# Patient Record
Sex: FEMALE | Race: WHITE | NOT HISPANIC OR LATINO | Employment: STUDENT | URBAN - METROPOLITAN AREA
[De-identification: names, ages, dates, MRNs, and addresses within clinical notes are randomized per-mention and may not be internally consistent; named-entity substitution may affect disease eponyms.]

---
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
## 2021-01-07 ENCOUNTER — PATIENT MESSAGE (OUTPATIENT)
Dept: PEDIATRICS | Facility: CLINIC | Age: 21
End: 2021-01-07

## 2021-01-07 ENCOUNTER — LAB VISIT (OUTPATIENT)
Dept: LAB | Facility: HOSPITAL | Age: 21
End: 2021-01-07
Attending: PEDIATRICS
Payer: COMMERCIAL

## 2021-01-07 ENCOUNTER — OFFICE VISIT (OUTPATIENT)
Dept: PEDIATRICS | Facility: CLINIC | Age: 21
End: 2021-01-07
Payer: COMMERCIAL

## 2021-01-07 VITALS
BODY MASS INDEX: 26.17 KG/M2 | DIASTOLIC BLOOD PRESSURE: 74 MMHG | HEIGHT: 67 IN | WEIGHT: 166.75 LBS | SYSTOLIC BLOOD PRESSURE: 112 MMHG | OXYGEN SATURATION: 84 % | HEART RATE: 70 BPM

## 2021-01-07 DIAGNOSIS — F60.3 BORDERLINE PERSONALITY DISORDER IN ADOLESCENT: ICD-10-CM

## 2021-01-07 DIAGNOSIS — K90.49 PLE (PROTEIN LOSING ENTEROPATHY): ICD-10-CM

## 2021-01-07 DIAGNOSIS — Z98.890 S/P FONTAN PROCEDURE: ICD-10-CM

## 2021-01-07 DIAGNOSIS — Z28.21 INFLUENZA VACCINE REFUSED: ICD-10-CM

## 2021-01-07 DIAGNOSIS — Q23.4 HLHS (HYPOPLASTIC LEFT HEART SYNDROME): ICD-10-CM

## 2021-01-07 DIAGNOSIS — Z00.00 ENCOUNTER FOR WELL ADULT EXAM WITHOUT ABNORMAL FINDINGS: Primary | ICD-10-CM

## 2021-01-07 LAB
ALBUMIN SERPL BCP-MCNC: 4.3 G/DL (ref 3.5–5.2)
ALP SERPL-CCNC: 69 U/L (ref 55–135)
ALT SERPL W/O P-5'-P-CCNC: 31 U/L (ref 10–44)
ANION GAP SERPL CALC-SCNC: 9 MMOL/L (ref 8–16)
AST SERPL-CCNC: 20 U/L (ref 10–40)
BASOPHILS # BLD AUTO: 0.04 K/UL (ref 0–0.2)
BASOPHILS NFR BLD: 0.5 % (ref 0–1.9)
BILIRUB SERPL-MCNC: 0.9 MG/DL (ref 0.1–1)
BNP SERPL-MCNC: 19 PG/ML (ref 0–99)
BUN SERPL-MCNC: 11 MG/DL (ref 6–20)
CALCIUM SERPL-MCNC: 9.3 MG/DL (ref 8.7–10.5)
CHLORIDE SERPL-SCNC: 104 MMOL/L (ref 95–110)
CO2 SERPL-SCNC: 26 MMOL/L (ref 23–29)
CREAT SERPL-MCNC: 0.8 MG/DL (ref 0.5–1.4)
DIFFERENTIAL METHOD: ABNORMAL
EOSINOPHIL # BLD AUTO: 0.2 K/UL (ref 0–0.5)
EOSINOPHIL NFR BLD: 2.5 % (ref 0–8)
ERYTHROCYTE [DISTWIDTH] IN BLOOD BY AUTOMATED COUNT: 12.8 % (ref 11.5–14.5)
EST. GFR  (AFRICAN AMERICAN): >60 ML/MIN/1.73 M^2
EST. GFR  (NON AFRICAN AMERICAN): >60 ML/MIN/1.73 M^2
GLUCOSE SERPL-MCNC: 87 MG/DL (ref 70–110)
HCT VFR BLD AUTO: 45.6 % (ref 37–48.5)
HGB BLD-MCNC: 15.6 G/DL (ref 12–16)
LYMPHOCYTES # BLD AUTO: 0.9 K/UL (ref 1–4.8)
LYMPHOCYTES NFR BLD: 11 % (ref 18–48)
MCH RBC QN AUTO: 31.6 PG (ref 27–31)
MCHC RBC AUTO-ENTMCNC: 34.2 G/DL (ref 32–36)
MCV RBC AUTO: 92 FL (ref 82–98)
MONOCYTES # BLD AUTO: 0.7 K/UL (ref 0.3–1)
MONOCYTES NFR BLD: 8.4 % (ref 4–15)
NEUTROPHILS # BLD AUTO: 6 K/UL (ref 1.8–7.7)
NEUTROPHILS NFR BLD: 77.6 % (ref 38–73)
PLATELET # BLD AUTO: 207 K/UL (ref 150–350)
PMV BLD AUTO: 10.8 FL (ref 9.2–12.9)
POTASSIUM SERPL-SCNC: 4.1 MMOL/L (ref 3.5–5.1)
PROT SERPL-MCNC: 6.9 G/DL (ref 6–8.4)
RBC # BLD AUTO: 4.94 M/UL (ref 4–5.4)
SODIUM SERPL-SCNC: 139 MMOL/L (ref 136–145)
WBC # BLD AUTO: 7.74 K/UL (ref 3.9–12.7)

## 2021-01-07 PROCEDURE — 99999 PR PBB SHADOW E&M-EST. PATIENT-LVL III: CPT | Mod: PBBFAC,,, | Performed by: PEDIATRICS

## 2021-01-07 PROCEDURE — 80053 COMPREHEN METABOLIC PANEL: CPT

## 2021-01-07 PROCEDURE — 85025 COMPLETE CBC W/AUTO DIFF WBC: CPT

## 2021-01-07 PROCEDURE — 99395 PREV VISIT EST AGE 18-39: CPT | Mod: S$GLB,,, | Performed by: PEDIATRICS

## 2021-01-07 PROCEDURE — 83880 ASSAY OF NATRIURETIC PEPTIDE: CPT

## 2021-01-07 PROCEDURE — 99395 PR PREVENTIVE VISIT,EST,18-39: ICD-10-PCS | Mod: S$GLB,,, | Performed by: PEDIATRICS

## 2021-01-07 PROCEDURE — 36415 COLL VENOUS BLD VENIPUNCTURE: CPT

## 2021-01-07 PROCEDURE — 99999 PR PBB SHADOW E&M-EST. PATIENT-LVL III: ICD-10-PCS | Mod: PBBFAC,,, | Performed by: PEDIATRICS

## 2021-01-07 RX ORDER — SERTRALINE HYDROCHLORIDE 50 MG/1
100 TABLET, FILM COATED ORAL DAILY
COMMUNITY
Start: 2020-12-28

## 2021-01-10 PROBLEM — F60.3 BORDERLINE PERSONALITY DISORDER IN ADOLESCENT: Status: ACTIVE | Noted: 2021-01-10

## 2021-01-11 ENCOUNTER — CLINICAL SUPPORT (OUTPATIENT)
Dept: PEDIATRIC CARDIOLOGY | Facility: CLINIC | Age: 21
End: 2021-01-11
Payer: COMMERCIAL

## 2021-01-11 ENCOUNTER — OFFICE VISIT (OUTPATIENT)
Dept: PEDIATRIC CARDIOLOGY | Facility: CLINIC | Age: 21
End: 2021-01-11
Payer: COMMERCIAL

## 2021-01-11 ENCOUNTER — HOSPITAL ENCOUNTER (OUTPATIENT)
Dept: PEDIATRIC CARDIOLOGY | Facility: HOSPITAL | Age: 21
Discharge: HOME | End: 2021-01-11
Attending: PEDIATRICS
Payer: COMMERCIAL

## 2021-01-11 VITALS
WEIGHT: 166.75 LBS | HEIGHT: 68 IN | DIASTOLIC BLOOD PRESSURE: 71 MMHG | SYSTOLIC BLOOD PRESSURE: 155 MMHG | HEART RATE: 77 BPM | BODY MASS INDEX: 25.27 KG/M2 | OXYGEN SATURATION: 77 %

## 2021-01-11 DIAGNOSIS — R00.2 PALPITATIONS: ICD-10-CM

## 2021-01-11 DIAGNOSIS — K90.49 PLE (PROTEIN LOSING ENTEROPATHY): ICD-10-CM

## 2021-01-11 DIAGNOSIS — Z98.890 S/P FONTAN PROCEDURE: ICD-10-CM

## 2021-01-11 DIAGNOSIS — Q23.4 HLHS (HYPOPLASTIC LEFT HEART SYNDROME): Primary | ICD-10-CM

## 2021-01-11 DIAGNOSIS — Z98.890 PERSONAL HISTORY OF SURGERY TO HEART AND GREAT VESSELS, PRESENTING HAZARDS TO HEALTH: ICD-10-CM

## 2021-01-11 DIAGNOSIS — Q23.4 HLHS (HYPOPLASTIC LEFT HEART SYNDROME): ICD-10-CM

## 2021-01-11 DIAGNOSIS — Z98.890 POST-FONTAN PROTEIN-LOSING ENTEROPATHY: ICD-10-CM

## 2021-01-11 DIAGNOSIS — Q27.30 AVM (ARTERIOVENOUS MALFORMATION): ICD-10-CM

## 2021-01-11 DIAGNOSIS — Q25.79: ICD-10-CM

## 2021-01-11 DIAGNOSIS — R68.89 EXERCISE INTOLERANCE: ICD-10-CM

## 2021-01-11 DIAGNOSIS — Q25.1 AORTA COARCTATION: ICD-10-CM

## 2021-01-11 DIAGNOSIS — K90.49 POST-FONTAN PROTEIN-LOSING ENTEROPATHY: ICD-10-CM

## 2021-01-11 DIAGNOSIS — Q25.6 PULMONARY ARTERY STENOSIS OF CENTRAL BRANCH: ICD-10-CM

## 2021-01-11 PROCEDURE — 99215 PR OFFICE/OUTPT VISIT, EST, LEVL V, 40-54 MIN: ICD-10-PCS | Mod: 25,S$GLB,, | Performed by: PEDIATRICS

## 2021-01-11 PROCEDURE — 99999 PR PBB SHADOW E&M-EST. PATIENT-LVL III: CPT | Mod: PBBFAC,,, | Performed by: PEDIATRICS

## 2021-01-11 PROCEDURE — 99215 OFFICE O/P EST HI 40 MIN: CPT | Mod: 25,S$GLB,, | Performed by: PEDIATRICS

## 2021-01-11 PROCEDURE — 93000 EKG 12-LEAD PEDIATRIC: ICD-10-PCS | Mod: S$GLB,,, | Performed by: PEDIATRICS

## 2021-01-11 PROCEDURE — 93000 ELECTROCARDIOGRAM COMPLETE: CPT | Mod: S$GLB,,, | Performed by: PEDIATRICS

## 2021-01-11 PROCEDURE — 93303 PR ECHO XTHORACIC,CONG A2M,COMPLETE: ICD-10-PCS | Mod: 26,,, | Performed by: PEDIATRICS

## 2021-01-11 PROCEDURE — 99999 PR PBB SHADOW E&M-EST. PATIENT-LVL III: ICD-10-PCS | Mod: PBBFAC,,, | Performed by: PEDIATRICS

## 2021-01-11 PROCEDURE — 93320 PR DOPPLER ECHO HEART,COMPLETE: ICD-10-PCS | Mod: 26,,, | Performed by: PEDIATRICS

## 2021-01-11 PROCEDURE — 93325 PR DOPPLER COLOR FLOW VELOCITY MAP: ICD-10-PCS | Mod: 26,,, | Performed by: PEDIATRICS

## 2021-01-11 PROCEDURE — 93320 DOPPLER ECHO COMPLETE: CPT | Mod: 26,,, | Performed by: PEDIATRICS

## 2021-01-11 PROCEDURE — 93303 ECHO TRANSTHORACIC: CPT | Mod: 26,,, | Performed by: PEDIATRICS

## 2021-01-11 PROCEDURE — 93325 DOPPLER ECHO COLOR FLOW MAPG: CPT | Mod: 26,,, | Performed by: PEDIATRICS

## 2021-01-12 ENCOUNTER — TELEPHONE (OUTPATIENT)
Dept: PEDIATRIC GASTROENTEROLOGY | Facility: CLINIC | Age: 21
End: 2021-01-12

## 2021-01-12 DIAGNOSIS — Z98.890 S/P FONTAN PROCEDURE: Primary | ICD-10-CM

## 2021-01-25 ENCOUNTER — DOCUMENTATION ONLY (OUTPATIENT)
Dept: TRANSPLANT | Facility: CLINIC | Age: 21
End: 2021-01-25

## 2021-01-26 ENCOUNTER — TELEPHONE (OUTPATIENT)
Dept: PEDIATRIC GASTROENTEROLOGY | Facility: CLINIC | Age: 21
End: 2021-01-26

## 2021-02-02 ENCOUNTER — HOSPITAL ENCOUNTER (EMERGENCY)
Facility: HOSPITAL | Age: 21
Discharge: HOME | End: 2021-02-03
Attending: EMERGENCY MEDICINE
Payer: COMMERCIAL

## 2021-02-02 ENCOUNTER — TELEPHONE (OUTPATIENT)
Dept: PEDIATRIC CARDIOLOGY | Facility: HOSPITAL | Age: 21
End: 2021-02-02

## 2021-02-02 DIAGNOSIS — Q23.4 HYPOPLASTIC LEFT HEART: ICD-10-CM

## 2021-02-02 DIAGNOSIS — R19.7 DIARRHEA, UNSPECIFIED TYPE: ICD-10-CM

## 2021-02-02 DIAGNOSIS — R06.02 SHORTNESS OF BREATH: Primary | ICD-10-CM

## 2021-02-02 DIAGNOSIS — R06.02 SOB (SHORTNESS OF BREATH): ICD-10-CM

## 2021-02-02 LAB
ALBUMIN SERPL BCP-MCNC: 4.2 G/DL (ref 3.5–5.2)
ALP SERPL-CCNC: 73 U/L (ref 55–135)
ALT SERPL W/O P-5'-P-CCNC: 33 U/L (ref 10–44)
ANION GAP SERPL CALC-SCNC: 10 MMOL/L (ref 8–16)
AST SERPL-CCNC: 33 U/L (ref 10–40)
B-HCG UR QL: NEGATIVE
BACTERIA #/AREA URNS AUTO: NORMAL /HPF
BASOPHILS # BLD AUTO: 0.03 K/UL (ref 0–0.2)
BASOPHILS NFR BLD: 0.3 % (ref 0–1.9)
BILIRUB SERPL-MCNC: 0.9 MG/DL (ref 0.1–1)
BILIRUB UR QL STRIP: NEGATIVE
BNP SERPL-MCNC: <10 PG/ML (ref 0–99)
BUN SERPL-MCNC: 12 MG/DL (ref 6–20)
CALCIUM SERPL-MCNC: 9.7 MG/DL (ref 8.7–10.5)
CHLORIDE SERPL-SCNC: 108 MMOL/L (ref 95–110)
CLARITY UR REFRACT.AUTO: ABNORMAL
CO2 SERPL-SCNC: 23 MMOL/L (ref 23–29)
COLOR UR AUTO: YELLOW
CREAT SERPL-MCNC: 0.8 MG/DL (ref 0.5–1.4)
CTP QC/QA: YES
CTP QC/QA: YES
D DIMER PPP IA.FEU-MCNC: 0.25 MG/L FEU
DIFFERENTIAL METHOD: ABNORMAL
EOSINOPHIL # BLD AUTO: 0.2 K/UL (ref 0–0.5)
EOSINOPHIL NFR BLD: 2 % (ref 0–8)
ERYTHROCYTE [DISTWIDTH] IN BLOOD BY AUTOMATED COUNT: 12.1 % (ref 11.5–14.5)
EST. GFR  (AFRICAN AMERICAN): >60 ML/MIN/1.73 M^2
EST. GFR  (NON AFRICAN AMERICAN): >60 ML/MIN/1.73 M^2
GLUCOSE SERPL-MCNC: 74 MG/DL (ref 70–110)
GLUCOSE UR QL STRIP: NEGATIVE
HCT VFR BLD AUTO: 44.2 % (ref 37–48.5)
HGB BLD-MCNC: 15.3 G/DL (ref 12–16)
HGB UR QL STRIP: ABNORMAL
IMM GRANULOCYTES # BLD AUTO: 0.02 K/UL (ref 0–0.04)
IMM GRANULOCYTES NFR BLD AUTO: 0.2 % (ref 0–0.5)
KETONES UR QL STRIP: NEGATIVE
LEUKOCYTE ESTERASE UR QL STRIP: NEGATIVE
LYMPHOCYTES # BLD AUTO: 0.9 K/UL (ref 1–4.8)
LYMPHOCYTES NFR BLD: 9.5 % (ref 18–48)
MCH RBC QN AUTO: 31.7 PG (ref 27–31)
MCHC RBC AUTO-ENTMCNC: 34.6 G/DL (ref 32–36)
MCV RBC AUTO: 92 FL (ref 82–98)
MICROSCOPIC COMMENT: NORMAL
MONOCYTES # BLD AUTO: 0.4 K/UL (ref 0.3–1)
MONOCYTES NFR BLD: 4.7 % (ref 4–15)
NEUTROPHILS # BLD AUTO: 7.8 K/UL (ref 1.8–7.7)
NEUTROPHILS NFR BLD: 83.3 % (ref 38–73)
NITRITE UR QL STRIP: NEGATIVE
NRBC BLD-RTO: 0 /100 WBC
PH UR STRIP: 5 [PH] (ref 5–8)
PLATELET # BLD AUTO: 206 K/UL (ref 150–350)
PMV BLD AUTO: 11.6 FL (ref 9.2–12.9)
POTASSIUM SERPL-SCNC: 4.4 MMOL/L (ref 3.5–5.1)
PROT SERPL-MCNC: 7.4 G/DL (ref 6–8.4)
PROT UR QL STRIP: NEGATIVE
RBC # BLD AUTO: 4.82 M/UL (ref 4–5.4)
RBC #/AREA URNS AUTO: 1 /HPF (ref 0–4)
SARS-COV-2 RDRP RESP QL NAA+PROBE: NEGATIVE
SODIUM SERPL-SCNC: 141 MMOL/L (ref 136–145)
SP GR UR STRIP: >=1.03 (ref 1–1.03)
SQUAMOUS #/AREA URNS AUTO: 2 /HPF
TROPONIN I SERPL DL<=0.01 NG/ML-MCNC: <0.006 NG/ML (ref 0–0.03)
URN SPEC COLLECT METH UR: ABNORMAL
WBC # BLD AUTO: 9.4 K/UL (ref 3.9–12.7)
WBC #/AREA URNS AUTO: 1 /HPF (ref 0–5)

## 2021-02-02 PROCEDURE — U0002 COVID-19 LAB TEST NON-CDC: HCPCS | Performed by: EMERGENCY MEDICINE

## 2021-02-02 PROCEDURE — 81001 URINALYSIS AUTO W/SCOPE: CPT

## 2021-02-02 PROCEDURE — 83880 ASSAY OF NATRIURETIC PEPTIDE: CPT

## 2021-02-02 PROCEDURE — 99285 PR EMERGENCY DEPT VISIT,LEVEL V: ICD-10-PCS | Mod: CR,,, | Performed by: EMERGENCY MEDICINE

## 2021-02-02 PROCEDURE — 85025 COMPLETE CBC W/AUTO DIFF WBC: CPT

## 2021-02-02 PROCEDURE — 93010 EKG 12-LEAD: ICD-10-PCS | Mod: ,,, | Performed by: PEDIATRICS

## 2021-02-02 PROCEDURE — 93005 ELECTROCARDIOGRAM TRACING: CPT

## 2021-02-02 PROCEDURE — 99285 EMERGENCY DEPT VISIT HI MDM: CPT | Mod: 25

## 2021-02-02 PROCEDURE — 84484 ASSAY OF TROPONIN QUANT: CPT

## 2021-02-02 PROCEDURE — 81025 URINE PREGNANCY TEST: CPT | Performed by: EMERGENCY MEDICINE

## 2021-02-02 PROCEDURE — 80053 COMPREHEN METABOLIC PANEL: CPT

## 2021-02-02 PROCEDURE — 85379 FIBRIN DEGRADATION QUANT: CPT

## 2021-02-02 PROCEDURE — 93010 ELECTROCARDIOGRAM REPORT: CPT | Mod: ,,, | Performed by: PEDIATRICS

## 2021-02-02 PROCEDURE — 99285 EMERGENCY DEPT VISIT HI MDM: CPT | Mod: CR,,, | Performed by: EMERGENCY MEDICINE

## 2021-02-03 VITALS
SYSTOLIC BLOOD PRESSURE: 135 MMHG | HEIGHT: 68 IN | BODY MASS INDEX: 25.16 KG/M2 | HEART RATE: 86 BPM | TEMPERATURE: 99 F | OXYGEN SATURATION: 87 % | RESPIRATION RATE: 20 BRPM | WEIGHT: 166 LBS | DIASTOLIC BLOOD PRESSURE: 67 MMHG

## 2021-02-09 ENCOUNTER — INFUSION (OUTPATIENT)
Dept: INFECTIOUS DISEASES | Facility: HOSPITAL | Age: 21
End: 2021-02-09
Attending: PEDIATRICS
Payer: COMMERCIAL

## 2021-02-09 ENCOUNTER — TELEPHONE (OUTPATIENT)
Dept: PEDIATRICS | Facility: CLINIC | Age: 21
End: 2021-02-09

## 2021-02-09 VITALS
DIASTOLIC BLOOD PRESSURE: 58 MMHG | BODY MASS INDEX: 25.11 KG/M2 | HEIGHT: 67 IN | SYSTOLIC BLOOD PRESSURE: 107 MMHG | OXYGEN SATURATION: 86 % | HEART RATE: 88 BPM | TEMPERATURE: 99 F | RESPIRATION RATE: 18 BRPM | WEIGHT: 160 LBS

## 2021-02-09 DIAGNOSIS — Q23.4 HLHS (HYPOPLASTIC LEFT HEART SYNDROME): ICD-10-CM

## 2021-02-09 DIAGNOSIS — R09.02 HYPOXIA: ICD-10-CM

## 2021-02-09 DIAGNOSIS — U07.1 COVID-19 VIRUS INFECTION: Primary | ICD-10-CM

## 2021-02-09 DIAGNOSIS — Z98.890 PERSONAL HISTORY OF SURGERY TO HEART AND GREAT VESSELS, PRESENTING HAZARDS TO HEALTH: ICD-10-CM

## 2021-02-09 PROCEDURE — M0239 BAMLANIVIMAB-XXXX INFUSION: HCPCS | Performed by: INTERNAL MEDICINE

## 2021-02-09 PROCEDURE — 25000003 PHARM REV CODE 250: Performed by: INTERNAL MEDICINE

## 2021-02-09 PROCEDURE — 63600175 PHARM REV CODE 636 W HCPCS: Performed by: INTERNAL MEDICINE

## 2021-02-09 RX ORDER — EPINEPHRINE 0.1 MG/ML
0.3 INJECTION INTRAVENOUS
Status: DISCONTINUED | OUTPATIENT
Start: 2021-02-09 | End: 2021-08-02

## 2021-02-09 RX ORDER — ALBUTEROL SULFATE 90 UG/1
2 AEROSOL, METERED RESPIRATORY (INHALATION)
Status: DISCONTINUED | OUTPATIENT
Start: 2021-02-09 | End: 2021-05-27 | Stop reason: HOSPADM

## 2021-02-09 RX ORDER — ACETAMINOPHEN 325 MG/1
650 TABLET ORAL ONCE AS NEEDED
Status: DISCONTINUED | OUTPATIENT
Start: 2021-02-09 | End: 2021-07-27

## 2021-02-09 RX ORDER — DIPHENHYDRAMINE HYDROCHLORIDE 50 MG/ML
25 INJECTION INTRAMUSCULAR; INTRAVENOUS ONCE AS NEEDED
Status: DISCONTINUED | OUTPATIENT
Start: 2021-02-09 | End: 2021-08-02

## 2021-02-09 RX ORDER — SODIUM CHLORIDE 0.9 % (FLUSH) 0.9 %
10 SYRINGE (ML) INJECTION
Status: DISCONTINUED | OUTPATIENT
Start: 2021-02-09 | End: 2021-08-02

## 2021-02-09 RX ORDER — ONDANSETRON 4 MG/1
4 TABLET, ORALLY DISINTEGRATING ORAL ONCE AS NEEDED
Status: DISCONTINUED | OUTPATIENT
Start: 2021-02-09 | End: 2021-08-02

## 2021-02-09 RX ADMIN — SODIUM CHLORIDE 700 MG: 9 INJECTION, SOLUTION INTRAVENOUS at 01:02

## 2021-02-15 ENCOUNTER — HOSPITAL ENCOUNTER (EMERGENCY)
Facility: HOSPITAL | Age: 21
Discharge: HOME | End: 2021-02-15
Attending: EMERGENCY MEDICINE
Payer: COMMERCIAL

## 2021-02-15 ENCOUNTER — NURSE TRIAGE (OUTPATIENT)
Dept: ADMINISTRATIVE | Facility: CLINIC | Age: 21
End: 2021-02-15

## 2021-02-15 VITALS
SYSTOLIC BLOOD PRESSURE: 113 MMHG | HEART RATE: 92 BPM | TEMPERATURE: 98 F | OXYGEN SATURATION: 88 % | RESPIRATION RATE: 20 BRPM | HEIGHT: 67 IN | WEIGHT: 165 LBS | DIASTOLIC BLOOD PRESSURE: 69 MMHG | BODY MASS INDEX: 25.9 KG/M2

## 2021-02-15 DIAGNOSIS — Q25.6 PULMONARY ARTERY STENOSIS OF CENTRAL BRANCH: ICD-10-CM

## 2021-02-15 DIAGNOSIS — R09.02 HYPOXEMIA: ICD-10-CM

## 2021-02-15 DIAGNOSIS — Z98.890 PERSONAL HISTORY OF SURGERY TO HEART AND GREAT VESSELS, PRESENTING HAZARDS TO HEALTH: ICD-10-CM

## 2021-02-15 DIAGNOSIS — Q23.4 HLHS (HYPOPLASTIC LEFT HEART SYNDROME): ICD-10-CM

## 2021-02-15 DIAGNOSIS — Z71.1 PERSON WITH FEARED COMPLAINT IN WHOM NO DIAGNOSIS IS MADE: Primary | ICD-10-CM

## 2021-02-15 LAB
ALBUMIN SERPL BCP-MCNC: 4.2 G/DL (ref 3.5–5.2)
ALP SERPL-CCNC: 77 U/L (ref 55–135)
ALT SERPL W/O P-5'-P-CCNC: 32 U/L (ref 10–44)
ANION GAP SERPL CALC-SCNC: 9 MMOL/L (ref 8–16)
AST SERPL-CCNC: 27 U/L (ref 10–40)
B-HCG UR QL: NEGATIVE
BASOPHILS # BLD AUTO: 0.03 K/UL (ref 0–0.2)
BASOPHILS NFR BLD: 0.4 % (ref 0–1.9)
BILIRUB SERPL-MCNC: 0.7 MG/DL (ref 0.1–1)
BILIRUB UR QL STRIP: NEGATIVE
BNP SERPL-MCNC: <10 PG/ML (ref 0–99)
BUN SERPL-MCNC: 16 MG/DL (ref 6–20)
CALCIUM SERPL-MCNC: 9.4 MG/DL (ref 8.7–10.5)
CHLORIDE SERPL-SCNC: 106 MMOL/L (ref 95–110)
CLARITY UR REFRACT.AUTO: ABNORMAL
CO2 SERPL-SCNC: 23 MMOL/L (ref 23–29)
COLOR UR AUTO: YELLOW
CREAT SERPL-MCNC: 0.8 MG/DL (ref 0.5–1.4)
CTP QC/QA: YES
DIFFERENTIAL METHOD: ABNORMAL
EOSINOPHIL # BLD AUTO: 0.2 K/UL (ref 0–0.5)
EOSINOPHIL NFR BLD: 2.8 % (ref 0–8)
ERYTHROCYTE [DISTWIDTH] IN BLOOD BY AUTOMATED COUNT: 12.1 % (ref 11.5–14.5)
EST. GFR  (AFRICAN AMERICAN): >60 ML/MIN/1.73 M^2
EST. GFR  (NON AFRICAN AMERICAN): >60 ML/MIN/1.73 M^2
GLUCOSE SERPL-MCNC: 138 MG/DL (ref 70–110)
GLUCOSE UR QL STRIP: NEGATIVE
HCT VFR BLD AUTO: 45.5 % (ref 37–48.5)
HGB BLD-MCNC: 16.3 G/DL (ref 12–16)
HGB UR QL STRIP: NEGATIVE
IMM GRANULOCYTES # BLD AUTO: 0.02 K/UL (ref 0–0.04)
IMM GRANULOCYTES NFR BLD AUTO: 0.2 % (ref 0–0.5)
KETONES UR QL STRIP: NEGATIVE
LEUKOCYTE ESTERASE UR QL STRIP: NEGATIVE
LYMPHOCYTES # BLD AUTO: 0.9 K/UL (ref 1–4.8)
LYMPHOCYTES NFR BLD: 11.1 % (ref 18–48)
MCH RBC QN AUTO: 31.6 PG (ref 27–31)
MCHC RBC AUTO-ENTMCNC: 35.8 G/DL (ref 32–36)
MCV RBC AUTO: 88 FL (ref 82–98)
MONOCYTES # BLD AUTO: 0.5 K/UL (ref 0.3–1)
MONOCYTES NFR BLD: 6.5 % (ref 4–15)
NEUTROPHILS # BLD AUTO: 6.6 K/UL (ref 1.8–7.7)
NEUTROPHILS NFR BLD: 79 % (ref 38–73)
NITRITE UR QL STRIP: NEGATIVE
NRBC BLD-RTO: 0 /100 WBC
PH UR STRIP: 5 [PH] (ref 5–8)
PLATELET # BLD AUTO: 258 K/UL (ref 150–350)
PMV BLD AUTO: 10.7 FL (ref 9.2–12.9)
POTASSIUM SERPL-SCNC: 4.2 MMOL/L (ref 3.5–5.1)
PROT SERPL-MCNC: 7.3 G/DL (ref 6–8.4)
PROT UR QL STRIP: NEGATIVE
RBC # BLD AUTO: 5.16 M/UL (ref 4–5.4)
SODIUM SERPL-SCNC: 138 MMOL/L (ref 136–145)
SP GR UR STRIP: 1.02 (ref 1–1.03)
URN SPEC COLLECT METH UR: ABNORMAL
WBC # BLD AUTO: 8.32 K/UL (ref 3.9–12.7)

## 2021-02-15 PROCEDURE — 83880 ASSAY OF NATRIURETIC PEPTIDE: CPT

## 2021-02-15 PROCEDURE — 80053 COMPREHEN METABOLIC PANEL: CPT

## 2021-02-15 PROCEDURE — 99284 EMERGENCY DEPT VISIT MOD MDM: CPT | Mod: CS,,, | Performed by: EMERGENCY MEDICINE

## 2021-02-15 PROCEDURE — 81003 URINALYSIS AUTO W/O SCOPE: CPT

## 2021-02-15 PROCEDURE — 99284 PR EMERGENCY DEPT VISIT,LEVEL IV: ICD-10-PCS | Mod: CS,,, | Performed by: EMERGENCY MEDICINE

## 2021-02-15 PROCEDURE — 99284 EMERGENCY DEPT VISIT MOD MDM: CPT | Mod: 25

## 2021-02-15 PROCEDURE — 81025 URINE PREGNANCY TEST: CPT | Performed by: EMERGENCY MEDICINE

## 2021-02-15 PROCEDURE — 85025 COMPLETE CBC W/AUTO DIFF WBC: CPT

## 2021-03-25 ENCOUNTER — TELEPHONE (OUTPATIENT)
Dept: PEDIATRIC CARDIOLOGY | Facility: CLINIC | Age: 21
End: 2021-03-25

## 2021-04-14 ENCOUNTER — PATIENT MESSAGE (OUTPATIENT)
Dept: PEDIATRICS | Facility: CLINIC | Age: 21
End: 2021-04-14

## 2021-04-14 ENCOUNTER — PATIENT MESSAGE (OUTPATIENT)
Dept: PEDIATRIC GASTROENTEROLOGY | Facility: CLINIC | Age: 21
End: 2021-04-14

## 2021-04-14 ENCOUNTER — TELEPHONE (OUTPATIENT)
Dept: PEDIATRIC GASTROENTEROLOGY | Facility: CLINIC | Age: 21
End: 2021-04-14

## 2021-04-14 ENCOUNTER — OFFICE VISIT (OUTPATIENT)
Dept: PEDIATRICS | Facility: CLINIC | Age: 21
End: 2021-04-14
Attending: PEDIATRICS
Payer: COMMERCIAL

## 2021-04-14 VITALS — TEMPERATURE: 99 F | BODY MASS INDEX: 27.17 KG/M2 | OXYGEN SATURATION: 82 % | HEART RATE: 89 BPM | WEIGHT: 173.5 LBS

## 2021-04-14 DIAGNOSIS — Z98.890 S/P FONTAN PROCEDURE: Primary | ICD-10-CM

## 2021-04-14 DIAGNOSIS — M54.9 ACUTE RIGHT-SIDED BACK PAIN, UNSPECIFIED BACK LOCATION: Primary | ICD-10-CM

## 2021-04-14 LAB
BILIRUB SERPL-MCNC: NORMAL MG/DL
BLOOD URINE, POC: NEGATIVE
COLOR, POC UA: YELLOW
GLUCOSE UR QL STRIP: NORMAL
KETONES UR QL STRIP: NEGATIVE
LEUKOCYTE ESTERASE URINE, POC: POSITIVE
NITRITE, POC UA: NEGATIVE
PH, POC UA: 5
PROTEIN, POC: NEGATIVE
SPECIFIC GRAVITY, POC UA: 1.01
UROBILINOGEN, POC UA: NORMAL

## 2021-04-14 PROCEDURE — 99214 OFFICE O/P EST MOD 30 MIN: CPT | Mod: 25,S$GLB,, | Performed by: PEDIATRICS

## 2021-04-14 PROCEDURE — 99999 PR PBB SHADOW E&M-EST. PATIENT-LVL III: CPT | Mod: PBBFAC,,, | Performed by: PEDIATRICS

## 2021-04-14 PROCEDURE — 99214 PR OFFICE/OUTPT VISIT, EST, LEVL IV, 30-39 MIN: ICD-10-PCS | Mod: 25,S$GLB,, | Performed by: PEDIATRICS

## 2021-04-14 PROCEDURE — 99999 PR PBB SHADOW E&M-EST. PATIENT-LVL III: ICD-10-PCS | Mod: PBBFAC,,, | Performed by: PEDIATRICS

## 2021-04-14 PROCEDURE — 81001 POCT URINALYSIS, DIPSTICK OR TABLET REAGENT, AUTOMATED, WITH MICROSCOP: ICD-10-PCS | Mod: S$GLB,,, | Performed by: PEDIATRICS

## 2021-04-14 PROCEDURE — 81001 URINALYSIS AUTO W/SCOPE: CPT | Mod: S$GLB,,, | Performed by: PEDIATRICS

## 2021-04-19 ENCOUNTER — HOSPITAL ENCOUNTER (OUTPATIENT)
Dept: RADIOLOGY | Facility: HOSPITAL | Age: 21
Discharge: HOME | End: 2021-04-19
Attending: PEDIATRICS
Payer: COMMERCIAL

## 2021-04-19 ENCOUNTER — OFFICE VISIT (OUTPATIENT)
Dept: PEDIATRIC GASTROENTEROLOGY | Facility: CLINIC | Age: 21
End: 2021-04-19
Payer: COMMERCIAL

## 2021-04-19 ENCOUNTER — TELEPHONE (OUTPATIENT)
Dept: PEDIATRIC GASTROENTEROLOGY | Facility: CLINIC | Age: 21
End: 2021-04-19

## 2021-04-19 VITALS
OXYGEN SATURATION: 84 % | DIASTOLIC BLOOD PRESSURE: 74 MMHG | BODY MASS INDEX: 26.34 KG/M2 | HEART RATE: 95 BPM | TEMPERATURE: 98 F | WEIGHT: 173.81 LBS | SYSTOLIC BLOOD PRESSURE: 127 MMHG | HEIGHT: 68 IN

## 2021-04-19 DIAGNOSIS — R10.11 RIGHT UPPER QUADRANT PAIN: ICD-10-CM

## 2021-04-19 DIAGNOSIS — Z98.890 S/P FONTAN PROCEDURE: ICD-10-CM

## 2021-04-19 DIAGNOSIS — Q23.4 HLHS (HYPOPLASTIC LEFT HEART SYNDROME): ICD-10-CM

## 2021-04-19 DIAGNOSIS — Q23.4 HLHS (HYPOPLASTIC LEFT HEART SYNDROME): Primary | ICD-10-CM

## 2021-04-19 DIAGNOSIS — R10.11 RIGHT UPPER QUADRANT ABDOMINAL PAIN: Primary | ICD-10-CM

## 2021-04-19 DIAGNOSIS — K76.1 CHRONIC PASSIVE CONGESTION OF LIVER: ICD-10-CM

## 2021-04-19 PROCEDURE — 91200 LIVER ELASTOGRAPHY: CPT | Mod: 26,,, | Performed by: RADIOLOGY

## 2021-04-19 PROCEDURE — 99214 OFFICE O/P EST MOD 30 MIN: CPT | Mod: S$GLB,,, | Performed by: PEDIATRICS

## 2021-04-19 PROCEDURE — 99999 PR PBB SHADOW E&M-EST. PATIENT-LVL IV: CPT | Mod: PBBFAC,,, | Performed by: PEDIATRICS

## 2021-04-19 PROCEDURE — 99214 PR OFFICE/OUTPT VISIT, EST, LEVL IV, 30-39 MIN: ICD-10-PCS | Mod: S$GLB,,, | Performed by: PEDIATRICS

## 2021-04-19 PROCEDURE — 76705 US ABDOMEN LIMITED: ICD-10-PCS | Mod: 26,59,, | Performed by: RADIOLOGY

## 2021-04-19 PROCEDURE — 76705 ECHO EXAM OF ABDOMEN: CPT | Mod: TC,59

## 2021-04-19 PROCEDURE — 76705 ECHO EXAM OF ABDOMEN: CPT | Mod: 26,59,, | Performed by: RADIOLOGY

## 2021-04-19 PROCEDURE — 91200 LIVER ELASTOGRAPHY: CPT | Mod: TC

## 2021-04-19 PROCEDURE — 91200 US ELASTOGRAPHY LIVER: ICD-10-PCS | Mod: 26,,, | Performed by: RADIOLOGY

## 2021-04-19 PROCEDURE — 99999 PR PBB SHADOW E&M-EST. PATIENT-LVL IV: ICD-10-PCS | Mod: PBBFAC,,, | Performed by: PEDIATRICS

## 2021-04-22 ENCOUNTER — PATIENT MESSAGE (OUTPATIENT)
Dept: PEDIATRIC GASTROENTEROLOGY | Facility: CLINIC | Age: 21
End: 2021-04-22

## 2021-04-24 ENCOUNTER — PATIENT MESSAGE (OUTPATIENT)
Dept: PEDIATRIC GASTROENTEROLOGY | Facility: CLINIC | Age: 21
End: 2021-04-24

## 2021-04-27 ENCOUNTER — TELEPHONE (OUTPATIENT)
Dept: ENDOSCOPY | Facility: HOSPITAL | Age: 21
End: 2021-04-27

## 2021-04-27 DIAGNOSIS — R19.7 DIARRHEA, UNSPECIFIED TYPE: Primary | ICD-10-CM

## 2021-04-28 ENCOUNTER — HOSPITAL ENCOUNTER (OUTPATIENT)
Dept: RADIOLOGY | Facility: HOSPITAL | Age: 21
Discharge: HOME | End: 2021-04-28
Attending: PEDIATRICS
Payer: COMMERCIAL

## 2021-04-28 DIAGNOSIS — R10.11 RIGHT UPPER QUADRANT PAIN: ICD-10-CM

## 2021-04-28 PROCEDURE — 78227 HEPATOBIL SYST IMAGE W/DRUG: CPT | Mod: TC

## 2021-04-28 PROCEDURE — 78227 NM HEPATOBILIARY(HIDA) WITH PHARM AND EF: ICD-10-PCS | Mod: 26,,, | Performed by: RADIOLOGY

## 2021-04-28 PROCEDURE — 78227 HEPATOBIL SYST IMAGE W/DRUG: CPT | Mod: 26,,, | Performed by: RADIOLOGY

## 2021-05-07 ENCOUNTER — OFFICE VISIT (OUTPATIENT)
Dept: GASTROENTEROLOGY | Facility: CLINIC | Age: 21
End: 2021-05-07
Payer: COMMERCIAL

## 2021-05-07 ENCOUNTER — HOSPITAL ENCOUNTER (OUTPATIENT)
Dept: RADIOLOGY | Facility: HOSPITAL | Age: 21
Discharge: HOME | End: 2021-05-07
Attending: INTERNAL MEDICINE
Payer: COMMERCIAL

## 2021-05-07 VITALS
HEART RATE: 76 BPM | HEIGHT: 67 IN | SYSTOLIC BLOOD PRESSURE: 108 MMHG | WEIGHT: 178.13 LBS | DIASTOLIC BLOOD PRESSURE: 52 MMHG | BODY MASS INDEX: 27.96 KG/M2

## 2021-05-07 DIAGNOSIS — Z98.890 S/P FONTAN PROCEDURE: ICD-10-CM

## 2021-05-07 DIAGNOSIS — K52.9 CHRONIC DIARRHEA: ICD-10-CM

## 2021-05-07 DIAGNOSIS — R10.11 RUQ ABDOMINAL PAIN: Primary | ICD-10-CM

## 2021-05-07 DIAGNOSIS — Q23.4 HLHS (HYPOPLASTIC LEFT HEART SYNDROME): ICD-10-CM

## 2021-05-07 DIAGNOSIS — R10.11 RUQ ABDOMINAL PAIN: ICD-10-CM

## 2021-05-07 PROCEDURE — 99205 OFFICE O/P NEW HI 60 MIN: CPT | Mod: S$GLB,,, | Performed by: INTERNAL MEDICINE

## 2021-05-07 PROCEDURE — 74177 CT ABDOMEN PELVIS WITH CONTRAST: ICD-10-PCS | Mod: 26,,, | Performed by: RADIOLOGY

## 2021-05-07 PROCEDURE — A9698 NON-RAD CONTRAST MATERIALNOC: HCPCS | Performed by: INTERNAL MEDICINE

## 2021-05-07 PROCEDURE — 74177 CT ABD & PELVIS W/CONTRAST: CPT | Mod: TC

## 2021-05-07 PROCEDURE — 99999 PR PBB SHADOW E&M-EST. PATIENT-LVL V: CPT | Mod: PBBFAC,,, | Performed by: INTERNAL MEDICINE

## 2021-05-07 PROCEDURE — 99205 PR OFFICE/OUTPT VISIT, NEW, LEVL V, 60-74 MIN: ICD-10-PCS | Mod: S$GLB,,, | Performed by: INTERNAL MEDICINE

## 2021-05-07 PROCEDURE — 74177 CT ABD & PELVIS W/CONTRAST: CPT | Mod: 26,,, | Performed by: RADIOLOGY

## 2021-05-07 PROCEDURE — 25500020 PHARM REV CODE 255: Performed by: INTERNAL MEDICINE

## 2021-05-07 PROCEDURE — 99999 PR PBB SHADOW E&M-EST. PATIENT-LVL V: ICD-10-PCS | Mod: PBBFAC,,, | Performed by: INTERNAL MEDICINE

## 2021-05-07 RX ADMIN — IOHEXOL 1000 ML: 9 SOLUTION ORAL at 01:05

## 2021-05-07 RX ADMIN — IOHEXOL 75 ML: 350 INJECTION, SOLUTION INTRAVENOUS at 02:05

## 2021-05-10 ENCOUNTER — OFFICE VISIT (OUTPATIENT)
Dept: OBSTETRICS AND GYNECOLOGY | Facility: CLINIC | Age: 21
End: 2021-05-10
Payer: COMMERCIAL

## 2021-05-10 VITALS
BODY MASS INDEX: 27.47 KG/M2 | DIASTOLIC BLOOD PRESSURE: 70 MMHG | SYSTOLIC BLOOD PRESSURE: 102 MMHG | WEIGHT: 175.38 LBS

## 2021-05-10 DIAGNOSIS — R10.9 ABDOMINAL PAIN, UNSPECIFIED ABDOMINAL LOCATION: Primary | ICD-10-CM

## 2021-05-10 DIAGNOSIS — Z30.431 ENCOUNTER FOR ROUTINE CHECKING OF INTRAUTERINE CONTRACEPTIVE DEVICE (IUD): ICD-10-CM

## 2021-05-10 PROCEDURE — 99212 OFFICE O/P EST SF 10 MIN: CPT | Mod: S$GLB,,, | Performed by: OBSTETRICS & GYNECOLOGY

## 2021-05-10 PROCEDURE — 99999 PR PBB SHADOW E&M-EST. PATIENT-LVL III: ICD-10-PCS | Mod: PBBFAC,,, | Performed by: OBSTETRICS & GYNECOLOGY

## 2021-05-10 PROCEDURE — 99999 PR PBB SHADOW E&M-EST. PATIENT-LVL III: CPT | Mod: PBBFAC,,, | Performed by: OBSTETRICS & GYNECOLOGY

## 2021-05-10 PROCEDURE — 99212 PR OFFICE/OUTPT VISIT, EST, LEVL II, 10-19 MIN: ICD-10-PCS | Mod: S$GLB,,, | Performed by: OBSTETRICS & GYNECOLOGY

## 2021-05-11 ENCOUNTER — TELEPHONE (OUTPATIENT)
Dept: ENDOSCOPY | Facility: HOSPITAL | Age: 21
End: 2021-05-11

## 2021-05-13 ENCOUNTER — PATIENT MESSAGE (OUTPATIENT)
Dept: GASTROENTEROLOGY | Facility: CLINIC | Age: 21
End: 2021-05-13

## 2021-05-17 ENCOUNTER — TELEPHONE (OUTPATIENT)
Dept: ENDOSCOPY | Facility: HOSPITAL | Age: 21
End: 2021-05-17

## 2021-05-17 DIAGNOSIS — Z01.818 PRE-OP TESTING: Primary | ICD-10-CM

## 2021-05-18 ENCOUNTER — PATIENT MESSAGE (OUTPATIENT)
Dept: ENDOSCOPY | Facility: HOSPITAL | Age: 21
End: 2021-05-18

## 2021-05-18 DIAGNOSIS — Z12.11 SPECIAL SCREENING FOR MALIGNANT NEOPLASMS, COLON: Primary | ICD-10-CM

## 2021-05-18 DIAGNOSIS — Z12.11 SCREENING FOR COLON CANCER: Primary | ICD-10-CM

## 2021-05-18 RX ORDER — SODIUM, POTASSIUM,MAG SULFATES 17.5-3.13G
1 SOLUTION, RECONSTITUTED, ORAL ORAL ONCE
Qty: 1 BOTTLE | Refills: 0 | Status: SHIPPED | OUTPATIENT
Start: 2021-05-18 | End: 2021-05-18

## 2021-05-18 RX ORDER — SODIUM, POTASSIUM,MAG SULFATES 17.5-3.13G
1 SOLUTION, RECONSTITUTED, ORAL ORAL DAILY
Qty: 1 KIT | Refills: 0 | Status: SHIPPED | OUTPATIENT
Start: 2021-05-18 | End: 2021-05-20

## 2021-05-19 ENCOUNTER — TELEPHONE (OUTPATIENT)
Dept: GASTROENTEROLOGY | Facility: CLINIC | Age: 21
End: 2021-05-19

## 2021-05-20 RX ORDER — DICYCLOMINE HYDROCHLORIDE 10 MG/1
10 CAPSULE ORAL 3 TIMES DAILY PRN
Qty: 42 CAPSULE | Refills: 0 | Status: SHIPPED | OUTPATIENT
Start: 2021-05-20 | End: 2021-06-03

## 2021-05-21 ENCOUNTER — TELEPHONE (OUTPATIENT)
Dept: GASTROENTEROLOGY | Facility: CLINIC | Age: 21
End: 2021-05-21

## 2021-05-24 ENCOUNTER — LAB VISIT (OUTPATIENT)
Dept: INTERNAL MEDICINE | Facility: CLINIC | Age: 21
End: 2021-05-24
Payer: COMMERCIAL

## 2021-05-24 DIAGNOSIS — Z01.818 PRE-OP TESTING: ICD-10-CM

## 2021-05-24 LAB — SARS-COV-2 RNA RESP QL NAA+PROBE: NOT DETECTED

## 2021-05-24 PROCEDURE — U0005 INFEC AGEN DETEC AMPLI PROBE: HCPCS | Performed by: FAMILY MEDICINE

## 2021-05-24 PROCEDURE — U0003 INFECTIOUS AGENT DETECTION BY NUCLEIC ACID (DNA OR RNA); SEVERE ACUTE RESPIRATORY SYNDROME CORONAVIRUS 2 (SARS-COV-2) (CORONAVIRUS DISEASE [COVID-19]), AMPLIFIED PROBE TECHNIQUE, MAKING USE OF HIGH THROUGHPUT TECHNOLOGIES AS DESCRIBED BY CMS-2020-01-R: HCPCS | Performed by: FAMILY MEDICINE

## 2021-05-25 ENCOUNTER — LAB VISIT (OUTPATIENT)
Dept: LAB | Facility: HOSPITAL | Age: 21
End: 2021-05-25
Attending: INTERNAL MEDICINE
Payer: COMMERCIAL

## 2021-05-25 DIAGNOSIS — R10.11 RUQ ABDOMINAL PAIN: ICD-10-CM

## 2021-05-25 DIAGNOSIS — K52.9 CHRONIC DIARRHEA: ICD-10-CM

## 2021-05-25 PROCEDURE — 87449 NOS EACH ORGANISM AG IA: CPT | Performed by: INTERNAL MEDICINE

## 2021-05-25 PROCEDURE — 87324 CLOSTRIDIUM AG IA: CPT | Mod: 59 | Performed by: INTERNAL MEDICINE

## 2021-05-25 PROCEDURE — 87209 SMEAR COMPLEX STAIN: CPT | Performed by: INTERNAL MEDICINE

## 2021-05-25 PROCEDURE — 87507 IADNA-DNA/RNA PROBE TQ 12-25: CPT | Performed by: INTERNAL MEDICINE

## 2021-05-26 ENCOUNTER — TELEPHONE (OUTPATIENT)
Dept: ENDOSCOPY | Facility: HOSPITAL | Age: 21
End: 2021-05-26

## 2021-05-26 ENCOUNTER — PATIENT MESSAGE (OUTPATIENT)
Dept: ENDOSCOPY | Facility: HOSPITAL | Age: 21
End: 2021-05-26

## 2021-05-26 LAB
C DIFF GDH STL QL: NEGATIVE
C DIFF TOX A+B STL QL IA: NEGATIVE

## 2021-05-27 ENCOUNTER — HOSPITAL ENCOUNTER (OUTPATIENT)
Facility: HOSPITAL | Age: 21
Discharge: HOME | End: 2021-05-27
Attending: INTERNAL MEDICINE | Admitting: INTERNAL MEDICINE
Payer: COMMERCIAL

## 2021-05-27 ENCOUNTER — ANESTHESIA EVENT (OUTPATIENT)
Dept: ENDOSCOPY | Facility: HOSPITAL | Age: 21
End: 2021-05-27
Payer: COMMERCIAL

## 2021-05-27 ENCOUNTER — ANESTHESIA (OUTPATIENT)
Dept: ENDOSCOPY | Facility: HOSPITAL | Age: 21
End: 2021-05-27
Payer: COMMERCIAL

## 2021-05-27 VITALS
BODY MASS INDEX: 25.9 KG/M2 | TEMPERATURE: 99 F | RESPIRATION RATE: 18 BRPM | SYSTOLIC BLOOD PRESSURE: 101 MMHG | DIASTOLIC BLOOD PRESSURE: 50 MMHG | WEIGHT: 165 LBS | HEART RATE: 72 BPM | HEIGHT: 67 IN | OXYGEN SATURATION: 92 %

## 2021-05-27 DIAGNOSIS — R10.9 ABDOMINAL PAIN, UNSPECIFIED ABDOMINAL LOCATION: Primary | ICD-10-CM

## 2021-05-27 LAB
B-HCG UR QL: NEGATIVE
CTP QC/QA: YES
O+P STL MICRO: NORMAL

## 2021-05-27 PROCEDURE — 45380 COLONOSCOPY AND BIOPSY: CPT | Performed by: INTERNAL MEDICINE

## 2021-05-27 PROCEDURE — 25000003 PHARM REV CODE 250: Performed by: ANESTHESIOLOGY

## 2021-05-27 PROCEDURE — 88305 TISSUE EXAM BY PATHOLOGIST: CPT | Performed by: PATHOLOGY

## 2021-05-27 PROCEDURE — D9220A PRA ANESTHESIA: Mod: ANES,,, | Performed by: ANESTHESIOLOGY

## 2021-05-27 PROCEDURE — 88305 TISSUE EXAM BY PATHOLOGIST: CPT | Mod: 26,,, | Performed by: PATHOLOGY

## 2021-05-27 PROCEDURE — 25000003 PHARM REV CODE 250: Performed by: INTERNAL MEDICINE

## 2021-05-27 PROCEDURE — D9220A PRA ANESTHESIA: Mod: CRNA,,, | Performed by: NURSE ANESTHETIST, CERTIFIED REGISTERED

## 2021-05-27 PROCEDURE — D9220A PRA ANESTHESIA: ICD-10-PCS | Mod: ANES,,, | Performed by: ANESTHESIOLOGY

## 2021-05-27 PROCEDURE — 88342 CHG IMMUNOCYTOCHEMISTRY: ICD-10-PCS | Mod: 26,,, | Performed by: PATHOLOGY

## 2021-05-27 PROCEDURE — 88305 TISSUE EXAM BY PATHOLOGIST: ICD-10-PCS | Mod: 26,,, | Performed by: PATHOLOGY

## 2021-05-27 PROCEDURE — 25000003 PHARM REV CODE 250: Performed by: NURSE ANESTHETIST, CERTIFIED REGISTERED

## 2021-05-27 PROCEDURE — 81025 URINE PREGNANCY TEST: CPT | Performed by: INTERNAL MEDICINE

## 2021-05-27 PROCEDURE — 43239 EGD BIOPSY SINGLE/MULTIPLE: CPT | Mod: 51,,, | Performed by: INTERNAL MEDICINE

## 2021-05-27 PROCEDURE — 43239 PR EGD, FLEX, W/BIOPSY, SGL/MULTI: ICD-10-PCS | Mod: 51,,, | Performed by: INTERNAL MEDICINE

## 2021-05-27 PROCEDURE — 27201012 HC FORCEPS, HOT/COLD, DISP: Performed by: INTERNAL MEDICINE

## 2021-05-27 PROCEDURE — 63600175 PHARM REV CODE 636 W HCPCS: Performed by: NURSE ANESTHETIST, CERTIFIED REGISTERED

## 2021-05-27 PROCEDURE — 88342 IMHCHEM/IMCYTCHM 1ST ANTB: CPT | Performed by: PATHOLOGY

## 2021-05-27 PROCEDURE — 88342 IMHCHEM/IMCYTCHM 1ST ANTB: CPT | Mod: 26,,, | Performed by: PATHOLOGY

## 2021-05-27 PROCEDURE — 37000008 HC ANESTHESIA 1ST 15 MINUTES: Performed by: INTERNAL MEDICINE

## 2021-05-27 PROCEDURE — D9220A PRA ANESTHESIA: ICD-10-PCS | Mod: CRNA,,, | Performed by: NURSE ANESTHETIST, CERTIFIED REGISTERED

## 2021-05-27 PROCEDURE — 45380 COLONOSCOPY AND BIOPSY: CPT | Mod: ,,, | Performed by: INTERNAL MEDICINE

## 2021-05-27 PROCEDURE — 37000009 HC ANESTHESIA EA ADD 15 MINS: Performed by: INTERNAL MEDICINE

## 2021-05-27 PROCEDURE — 45380 PR COLONOSCOPY,BIOPSY: ICD-10-PCS | Mod: ,,, | Performed by: INTERNAL MEDICINE

## 2021-05-27 PROCEDURE — 43239 EGD BIOPSY SINGLE/MULTIPLE: CPT | Performed by: INTERNAL MEDICINE

## 2021-05-27 RX ORDER — MEPERIDINE HYDROCHLORIDE 50 MG/ML
12.5 INJECTION INTRAMUSCULAR; INTRAVENOUS; SUBCUTANEOUS ONCE AS NEEDED
Status: DISCONTINUED | OUTPATIENT
Start: 2021-05-27 | End: 2021-05-27 | Stop reason: HOSPADM

## 2021-05-27 RX ORDER — SODIUM CHLORIDE 0.9 % (FLUSH) 0.9 %
3 SYRINGE (ML) INJECTION
Status: DISCONTINUED | OUTPATIENT
Start: 2021-05-27 | End: 2021-05-27 | Stop reason: HOSPADM

## 2021-05-27 RX ORDER — SODIUM CHLORIDE 9 MG/ML
INJECTION, SOLUTION INTRAVENOUS CONTINUOUS
Status: DISCONTINUED | OUTPATIENT
Start: 2021-05-27 | End: 2021-05-27 | Stop reason: HOSPADM

## 2021-05-27 RX ORDER — PROPOFOL 10 MG/ML
VIAL (ML) INTRAVENOUS
Status: DISCONTINUED | OUTPATIENT
Start: 2021-05-27 | End: 2021-05-27

## 2021-05-27 RX ORDER — ETOMIDATE 2 MG/ML
INJECTION INTRAVENOUS
Status: DISCONTINUED | OUTPATIENT
Start: 2021-05-27 | End: 2021-05-27

## 2021-05-27 RX ORDER — KETAMINE HCL IN 0.9 % NACL 50 MG/5 ML
SYRINGE (ML) INTRAVENOUS
Status: DISCONTINUED | OUTPATIENT
Start: 2021-05-27 | End: 2021-05-27

## 2021-05-27 RX ORDER — IBUPROFEN 200 MG
400 TABLET ORAL ONCE
Status: COMPLETED | OUTPATIENT
Start: 2021-05-27 | End: 2021-05-27

## 2021-05-27 RX ORDER — PROPOFOL 10 MG/ML
VIAL (ML) INTRAVENOUS CONTINUOUS PRN
Status: DISCONTINUED | OUTPATIENT
Start: 2021-05-27 | End: 2021-05-27

## 2021-05-27 RX ORDER — ONDANSETRON 2 MG/ML
4 INJECTION INTRAMUSCULAR; INTRAVENOUS DAILY PRN
Status: DISCONTINUED | OUTPATIENT
Start: 2021-05-27 | End: 2021-05-27 | Stop reason: HOSPADM

## 2021-05-27 RX ORDER — LIDOCAINE HYDROCHLORIDE 20 MG/ML
INJECTION INTRAVENOUS
Status: DISCONTINUED | OUTPATIENT
Start: 2021-05-27 | End: 2021-05-27

## 2021-05-27 RX ORDER — MIDAZOLAM HYDROCHLORIDE 1 MG/ML
INJECTION INTRAMUSCULAR; INTRAVENOUS
Status: DISCONTINUED | OUTPATIENT
Start: 2021-05-27 | End: 2021-05-27

## 2021-05-27 RX ORDER — HYDROMORPHONE HYDROCHLORIDE 1 MG/ML
0.2 INJECTION, SOLUTION INTRAMUSCULAR; INTRAVENOUS; SUBCUTANEOUS EVERY 5 MIN PRN
Status: DISCONTINUED | OUTPATIENT
Start: 2021-05-27 | End: 2021-05-27 | Stop reason: HOSPADM

## 2021-05-27 RX ADMIN — LIDOCAINE HYDROCHLORIDE 75 MG: 20 INJECTION, SOLUTION INTRAVENOUS at 08:05

## 2021-05-27 RX ADMIN — IBUPROFEN 400 MG: 200 TABLET, FILM COATED ORAL at 09:05

## 2021-05-27 RX ADMIN — SODIUM CHLORIDE: 0.9 INJECTION, SOLUTION INTRAVENOUS at 07:05

## 2021-05-27 RX ADMIN — ETOMIDATE 4 MG: 2 INJECTION, SOLUTION INTRAVENOUS at 08:05

## 2021-05-27 RX ADMIN — PROPOFOL 70 MCG/KG/MIN: 10 INJECTION, EMULSION INTRAVENOUS at 08:05

## 2021-05-27 RX ADMIN — PROPOFOL 20 MG: 10 INJECTION, EMULSION INTRAVENOUS at 08:05

## 2021-05-27 RX ADMIN — ETOMIDATE 6 MG: 2 INJECTION, SOLUTION INTRAVENOUS at 08:05

## 2021-05-27 RX ADMIN — MIDAZOLAM HYDROCHLORIDE 2 MG: 1 INJECTION, SOLUTION INTRAMUSCULAR; INTRAVENOUS at 08:05

## 2021-05-27 RX ADMIN — Medication 20 MG: at 08:05

## 2021-05-28 LAB
GPP - ADENOVIRUS 40/41: NOT DETECTED
GPP - CAMPYLOBACTER: NOT DETECTED
GPP - CRYPTOSPORIDIUM: NOT DETECTED
GPP - E COLI O157: NOT DETECTED
GPP - ENTAMOEBA HISTOLYTICA: NOT DETECTED
GPP - ENTEROTOXIGENIC E COLI (ETEC): NOT DETECTED
GPP - GIARDIA LAMBLIA: NOT DETECTED
GPP - NOROVIRUS GI/GII: NOT DETECTED
GPP - ROTAVIRUS A: NOT DETECTED
GPP - SALMONELLA: NOT DETECTED
GPP - SHIGELLA: NOT DETECTED
GPP - VIBRIO CHOLERA: NOT DETECTED
GPP - YERSINIA ENTEROCOLITICA: NOT DETECTED
LACTATE PLASV-SCNC: NOT DETECTED MMOL/L

## 2021-06-06 ENCOUNTER — PATIENT MESSAGE (OUTPATIENT)
Dept: OBSTETRICS AND GYNECOLOGY | Facility: CLINIC | Age: 21
End: 2021-06-06

## 2021-06-06 ENCOUNTER — PATIENT MESSAGE (OUTPATIENT)
Dept: GASTROENTEROLOGY | Facility: CLINIC | Age: 21
End: 2021-06-06

## 2021-06-07 ENCOUNTER — PATIENT MESSAGE (OUTPATIENT)
Dept: GASTROENTEROLOGY | Facility: CLINIC | Age: 21
End: 2021-06-07

## 2021-06-07 RX ORDER — DICYCLOMINE HYDROCHLORIDE 10 MG/1
10 CAPSULE ORAL 3 TIMES DAILY PRN
Qty: 90 CAPSULE | Refills: 0 | Status: SHIPPED | OUTPATIENT
Start: 2021-06-07 | End: 2021-07-07

## 2021-06-08 ENCOUNTER — TELEPHONE (OUTPATIENT)
Dept: ENDOSCOPY | Facility: HOSPITAL | Age: 21
End: 2021-06-08

## 2021-06-09 LAB
FINAL PATHOLOGIC DIAGNOSIS: NORMAL
GROSS: NORMAL
Lab: NORMAL
SUPPLEMENTAL DIAGNOSIS: NORMAL

## 2021-06-10 DIAGNOSIS — Q23.4 HLHS (HYPOPLASTIC LEFT HEART SYNDROME): Primary | ICD-10-CM

## 2021-06-10 DIAGNOSIS — Z98.890 STATUS POST FONTAN OPERATION: ICD-10-CM

## 2021-06-23 ENCOUNTER — OFFICE VISIT (OUTPATIENT)
Dept: GASTROENTEROLOGY | Facility: CLINIC | Age: 21
End: 2021-06-23
Payer: COMMERCIAL

## 2021-06-23 ENCOUNTER — PATIENT MESSAGE (OUTPATIENT)
Dept: GASTROENTEROLOGY | Facility: CLINIC | Age: 21
End: 2021-06-23

## 2021-06-23 VITALS
WEIGHT: 178.81 LBS | HEART RATE: 84 BPM | BODY MASS INDEX: 28.07 KG/M2 | HEIGHT: 67 IN | DIASTOLIC BLOOD PRESSURE: 67 MMHG | SYSTOLIC BLOOD PRESSURE: 104 MMHG

## 2021-06-23 DIAGNOSIS — K52.9 CHRONIC DIARRHEA: Primary | ICD-10-CM

## 2021-06-23 PROCEDURE — 99214 PR OFFICE/OUTPT VISIT, EST, LEVL IV, 30-39 MIN: ICD-10-PCS | Mod: S$GLB,,, | Performed by: NURSE PRACTITIONER

## 2021-06-23 PROCEDURE — 99999 PR PBB SHADOW E&M-EST. PATIENT-LVL IV: CPT | Mod: PBBFAC,,, | Performed by: NURSE PRACTITIONER

## 2021-06-23 PROCEDURE — 99214 OFFICE O/P EST MOD 30 MIN: CPT | Mod: S$GLB,,, | Performed by: NURSE PRACTITIONER

## 2021-06-23 PROCEDURE — 99999 PR PBB SHADOW E&M-EST. PATIENT-LVL IV: ICD-10-PCS | Mod: PBBFAC,,, | Performed by: NURSE PRACTITIONER

## 2021-06-25 ENCOUNTER — LAB VISIT (OUTPATIENT)
Dept: LAB | Facility: HOSPITAL | Age: 21
End: 2021-06-25
Attending: NURSE PRACTITIONER
Payer: COMMERCIAL

## 2021-06-25 DIAGNOSIS — K52.9 CHRONIC DIARRHEA: ICD-10-CM

## 2021-06-25 LAB
BREATH H2 1.5H P LAC: 85 PPM
BREATH H2 1H P LAC: 81 PPM
BREATH H2 30M P LAC: 21 PPM
BREATH H2 P 10 G LACTULOSE PO: 33 PPM
BREATH H2 P 10 G LACTULOSE PO: 35 PPM
BREATH H2 PRE LAC: 24 PPM
LACTULOSE BT COL PERIOD: NORMAL HR
LACTULOSE BT INTERPRETATION: NORMAL

## 2021-06-25 PROCEDURE — 91065 BREATH HYDROGEN/METHANE TEST: CPT | Performed by: NURSE PRACTITIONER

## 2021-06-28 ENCOUNTER — PATIENT MESSAGE (OUTPATIENT)
Dept: GASTROENTEROLOGY | Facility: CLINIC | Age: 21
End: 2021-06-28

## 2021-06-28 DIAGNOSIS — K58.0 IRRITABLE BOWEL SYNDROME WITH DIARRHEA: Primary | ICD-10-CM

## 2021-06-28 DIAGNOSIS — K63.8219 SMALL INTESTINAL BACTERIAL OVERGROWTH: ICD-10-CM

## 2021-07-10 ENCOUNTER — PATIENT MESSAGE (OUTPATIENT)
Dept: GASTROENTEROLOGY | Facility: CLINIC | Age: 21
End: 2021-07-10

## 2021-07-14 ENCOUNTER — PATIENT MESSAGE (OUTPATIENT)
Dept: GASTROENTEROLOGY | Facility: CLINIC | Age: 21
End: 2021-07-14

## 2021-07-15 ENCOUNTER — TELEPHONE (OUTPATIENT)
Dept: GASTROENTEROLOGY | Facility: CLINIC | Age: 21
End: 2021-07-15

## 2021-07-15 ENCOUNTER — PATIENT MESSAGE (OUTPATIENT)
Dept: GASTROENTEROLOGY | Facility: CLINIC | Age: 21
End: 2021-07-15

## 2021-07-15 DIAGNOSIS — K63.8219 SMALL INTESTINAL BACTERIAL OVERGROWTH: ICD-10-CM

## 2021-07-15 DIAGNOSIS — K58.0 IRRITABLE BOWEL SYNDROME WITH DIARRHEA: Primary | ICD-10-CM

## 2021-07-15 RX ORDER — NEOMYCIN SULFATE 500 MG/1
500 TABLET ORAL 2 TIMES DAILY
Qty: 28 TABLET | Refills: 0 | Status: SHIPPED | OUTPATIENT
Start: 2021-07-15 | End: 2021-07-29

## 2021-07-27 ENCOUNTER — OFFICE VISIT (OUTPATIENT)
Dept: PEDIATRICS | Facility: CLINIC | Age: 21
End: 2021-07-27
Payer: COMMERCIAL

## 2021-07-27 VITALS — WEIGHT: 179.25 LBS | BODY MASS INDEX: 28.07 KG/M2 | OXYGEN SATURATION: 82 % | TEMPERATURE: 98 F | HEART RATE: 76 BPM

## 2021-07-27 DIAGNOSIS — Q23.4 HLHS (HYPOPLASTIC LEFT HEART SYNDROME): ICD-10-CM

## 2021-07-27 DIAGNOSIS — Z98.890 PERSONAL HISTORY OF SURGERY TO HEART AND GREAT VESSELS, PRESENTING HAZARDS TO HEALTH: ICD-10-CM

## 2021-07-27 DIAGNOSIS — J06.9 UPPER RESPIRATORY TRACT INFECTION, UNSPECIFIED TYPE: Primary | ICD-10-CM

## 2021-07-27 PROCEDURE — 99213 OFFICE O/P EST LOW 20 MIN: CPT | Mod: S$GLB,,, | Performed by: PEDIATRICS

## 2021-07-27 PROCEDURE — 99213 PR OFFICE/OUTPT VISIT, EST, LEVL III, 20-29 MIN: ICD-10-PCS | Mod: S$GLB,,, | Performed by: PEDIATRICS

## 2021-07-27 PROCEDURE — 99999 PR PBB SHADOW E&M-EST. PATIENT-LVL III: ICD-10-PCS | Mod: PBBFAC,,, | Performed by: PEDIATRICS

## 2021-07-27 PROCEDURE — 99999 PR PBB SHADOW E&M-EST. PATIENT-LVL III: CPT | Mod: PBBFAC,,, | Performed by: PEDIATRICS

## 2021-07-28 DIAGNOSIS — Q23.4 HLHS (HYPOPLASTIC LEFT HEART SYNDROME): Primary | ICD-10-CM

## 2021-08-02 ENCOUNTER — HOSPITAL ENCOUNTER (OUTPATIENT)
Dept: PEDIATRIC CARDIOLOGY | Facility: HOSPITAL | Age: 21
Discharge: HOME | End: 2021-08-02
Attending: PEDIATRICS
Payer: COMMERCIAL

## 2021-08-02 ENCOUNTER — CLINICAL SUPPORT (OUTPATIENT)
Dept: PEDIATRIC CARDIOLOGY | Facility: CLINIC | Age: 21
End: 2021-08-02
Payer: COMMERCIAL

## 2021-08-02 ENCOUNTER — OFFICE VISIT (OUTPATIENT)
Dept: PEDIATRIC CARDIOLOGY | Facility: CLINIC | Age: 21
End: 2021-08-02
Payer: COMMERCIAL

## 2021-08-02 VITALS
HEART RATE: 83 BPM | BODY MASS INDEX: 27.17 KG/M2 | SYSTOLIC BLOOD PRESSURE: 135 MMHG | OXYGEN SATURATION: 86 % | WEIGHT: 179.25 LBS | DIASTOLIC BLOOD PRESSURE: 69 MMHG | HEIGHT: 68 IN

## 2021-08-02 DIAGNOSIS — Q23.4 HLHS (HYPOPLASTIC LEFT HEART SYNDROME): ICD-10-CM

## 2021-08-02 DIAGNOSIS — Z98.890 POST-FONTAN PROTEIN-LOSING ENTEROPATHY: ICD-10-CM

## 2021-08-02 DIAGNOSIS — Z98.890 S/P FONTAN PROCEDURE: ICD-10-CM

## 2021-08-02 DIAGNOSIS — K90.49 POST-FONTAN PROTEIN-LOSING ENTEROPATHY: ICD-10-CM

## 2021-08-02 DIAGNOSIS — Z98.890 PERSONAL HISTORY OF SURGERY TO HEART AND GREAT VESSELS, PRESENTING HAZARDS TO HEALTH: ICD-10-CM

## 2021-08-02 DIAGNOSIS — Q25.1 AORTA COARCTATION: ICD-10-CM

## 2021-08-02 DIAGNOSIS — Q23.4 HLHS (HYPOPLASTIC LEFT HEART SYNDROME): Primary | ICD-10-CM

## 2021-08-02 DIAGNOSIS — R68.89 EXERCISE INTOLERANCE: ICD-10-CM

## 2021-08-02 DIAGNOSIS — Q25.6 PULMONARY ARTERY STENOSIS OF CENTRAL BRANCH: ICD-10-CM

## 2021-08-02 DIAGNOSIS — Z98.890 STATUS POST FONTAN OPERATION: ICD-10-CM

## 2021-08-02 DIAGNOSIS — K76.1 CHRONIC PASSIVE CONGESTION OF LIVER: ICD-10-CM

## 2021-08-02 DIAGNOSIS — Q25.79: ICD-10-CM

## 2021-08-02 DIAGNOSIS — Q27.30 AVM (ARTERIOVENOUS MALFORMATION): ICD-10-CM

## 2021-08-02 PROCEDURE — 93304 ECHO TRANSTHORACIC: CPT | Mod: 26,,, | Performed by: PEDIATRICS

## 2021-08-02 PROCEDURE — 93325 DOPPLER ECHO COLOR FLOW MAPG: CPT | Mod: 26,,, | Performed by: PEDIATRICS

## 2021-08-02 PROCEDURE — 99215 PR OFFICE/OUTPT VISIT, EST, LEVL V, 40-54 MIN: ICD-10-PCS | Mod: 25,S$GLB,, | Performed by: PEDIATRICS

## 2021-08-02 PROCEDURE — 93321 PR DOPPLER ECHO HEART,LIMITED,F/U: ICD-10-PCS | Mod: 26,,, | Performed by: PEDIATRICS

## 2021-08-02 PROCEDURE — 93242 EXT ECG>48HR<7D RECORDING: CPT

## 2021-08-02 PROCEDURE — 99999 PR PBB SHADOW E&M-EST. PATIENT-LVL III: CPT | Mod: PBBFAC,,, | Performed by: PEDIATRICS

## 2021-08-02 PROCEDURE — 99215 OFFICE O/P EST HI 40 MIN: CPT | Mod: 25,S$GLB,, | Performed by: PEDIATRICS

## 2021-08-02 PROCEDURE — 93244 EXT ECG>48HR<7D REV&INTERPJ: CPT | Mod: ,,, | Performed by: PEDIATRICS

## 2021-08-02 PROCEDURE — 93304 PR ECHO XTHORACIC,CONG A2M,LIMITED: ICD-10-PCS | Mod: 26,,, | Performed by: PEDIATRICS

## 2021-08-02 PROCEDURE — 93000 EKG 12-LEAD PEDIATRIC: ICD-10-PCS | Mod: S$GLB,,, | Performed by: PEDIATRICS

## 2021-08-02 PROCEDURE — 93325 PR DOPPLER COLOR FLOW VELOCITY MAP: ICD-10-PCS | Mod: 26,,, | Performed by: PEDIATRICS

## 2021-08-02 PROCEDURE — 93000 ELECTROCARDIOGRAM COMPLETE: CPT | Mod: S$GLB,,, | Performed by: PEDIATRICS

## 2021-08-02 PROCEDURE — 93321 DOPPLER ECHO F-UP/LMTD STD: CPT | Mod: 26,,, | Performed by: PEDIATRICS

## 2021-08-02 PROCEDURE — 99999 PR PBB SHADOW E&M-EST. PATIENT-LVL III: ICD-10-PCS | Mod: PBBFAC,,, | Performed by: PEDIATRICS

## 2021-08-02 PROCEDURE — 93244 CV 3-14 DAY PEDIATRIC HOLTER MONITOR (CUPID ONLY): ICD-10-PCS | Mod: ,,, | Performed by: PEDIATRICS

## 2021-08-17 LAB
OHS CV HOLTER HOOKUP DATE: NORMAL
OHS CV HOLTER HOOKUP TIME: NORMAL
OHS CV HOLTER SCAN DATE: NORMAL
OHS CV HOLTER SINUS AVERAGE HR: 80 BPM
OHS CV HOLTER SINUS MAX HR: 136 BPM
OHS CV HOLTER SINUS MIN HR: 54 BPM
OHS CV HOLTER STUDY END DATE: NORMAL
OHS CV HOLTER STUDY END TIME: NORMAL

## 2021-08-20 ENCOUNTER — TELEPHONE (OUTPATIENT)
Dept: PEDIATRIC CARDIOLOGY | Facility: CLINIC | Age: 21
End: 2021-08-20

## 2021-08-20 ENCOUNTER — HOSPITAL ENCOUNTER (EMERGENCY)
Facility: HOSPITAL | Age: 21
Discharge: HOME | End: 2021-08-20
Attending: EMERGENCY MEDICINE
Payer: COMMERCIAL

## 2021-08-20 VITALS
WEIGHT: 170 LBS | SYSTOLIC BLOOD PRESSURE: 129 MMHG | BODY MASS INDEX: 26.68 KG/M2 | HEART RATE: 91 BPM | OXYGEN SATURATION: 89 % | TEMPERATURE: 98 F | HEIGHT: 67 IN | DIASTOLIC BLOOD PRESSURE: 72 MMHG | RESPIRATION RATE: 20 BRPM

## 2021-08-20 DIAGNOSIS — R07.9 CHEST PAIN: ICD-10-CM

## 2021-08-20 DIAGNOSIS — Q23.4 HYPOPLASTIC LEFT HEART: ICD-10-CM

## 2021-08-20 DIAGNOSIS — R07.9 CHEST PAIN, UNSPECIFIED TYPE: Primary | ICD-10-CM

## 2021-08-20 LAB
ALBUMIN SERPL BCP-MCNC: 4.7 G/DL (ref 3.5–5.2)
ALP SERPL-CCNC: 92 U/L (ref 55–135)
ALT SERPL W/O P-5'-P-CCNC: 36 U/L (ref 10–44)
ANION GAP SERPL CALC-SCNC: 11 MMOL/L (ref 8–16)
AST SERPL-CCNC: 20 U/L (ref 10–40)
B-HCG UR QL: NEGATIVE
BASOPHILS # BLD AUTO: 0.05 K/UL (ref 0–0.2)
BASOPHILS NFR BLD: 0.5 % (ref 0–1.9)
BILIRUB SERPL-MCNC: 1.1 MG/DL (ref 0.1–1)
BNP SERPL-MCNC: 14 PG/ML (ref 0–99)
BUN SERPL-MCNC: 11 MG/DL (ref 6–20)
CALCIUM SERPL-MCNC: 10.5 MG/DL (ref 8.7–10.5)
CHLORIDE SERPL-SCNC: 105 MMOL/L (ref 95–110)
CO2 SERPL-SCNC: 21 MMOL/L (ref 23–29)
CREAT SERPL-MCNC: 0.7 MG/DL (ref 0.5–1.4)
CTP QC/QA: YES
CTP QC/QA: YES
D DIMER PPP IA.FEU-MCNC: <0.19 MG/L FEU
DIFFERENTIAL METHOD: ABNORMAL
EOSINOPHIL # BLD AUTO: 0.3 K/UL (ref 0–0.5)
EOSINOPHIL NFR BLD: 2.5 % (ref 0–8)
ERYTHROCYTE [DISTWIDTH] IN BLOOD BY AUTOMATED COUNT: 12.7 % (ref 11.5–14.5)
EST. GFR  (AFRICAN AMERICAN): >60 ML/MIN/1.73 M^2
EST. GFR  (NON AFRICAN AMERICAN): >60 ML/MIN/1.73 M^2
GLUCOSE SERPL-MCNC: 96 MG/DL (ref 70–110)
HCT VFR BLD AUTO: 49.2 % (ref 37–48.5)
HGB BLD-MCNC: 17.7 G/DL (ref 12–16)
IMM GRANULOCYTES # BLD AUTO: 0.04 K/UL (ref 0–0.04)
IMM GRANULOCYTES NFR BLD AUTO: 0.4 % (ref 0–0.5)
LYMPHOCYTES # BLD AUTO: 1.3 K/UL (ref 1–4.8)
LYMPHOCYTES NFR BLD: 12.3 % (ref 18–48)
MAGNESIUM SERPL-MCNC: 1.8 MG/DL (ref 1.6–2.6)
MCH RBC QN AUTO: 31.4 PG (ref 27–31)
MCHC RBC AUTO-ENTMCNC: 36 G/DL (ref 32–36)
MCV RBC AUTO: 87 FL (ref 82–98)
MONOCYTES # BLD AUTO: 0.8 K/UL (ref 0.3–1)
MONOCYTES NFR BLD: 8 % (ref 4–15)
NEUTROPHILS # BLD AUTO: 8 K/UL (ref 1.8–7.7)
NEUTROPHILS NFR BLD: 76.3 % (ref 38–73)
NRBC BLD-RTO: 0 /100 WBC
PLATELET # BLD AUTO: 239 K/UL (ref 150–450)
PMV BLD AUTO: 10 FL (ref 9.2–12.9)
POTASSIUM SERPL-SCNC: 3.8 MMOL/L (ref 3.5–5.1)
PROT SERPL-MCNC: 7.9 G/DL (ref 6–8.4)
RBC # BLD AUTO: 5.64 M/UL (ref 4–5.4)
SARS-COV-2 RDRP RESP QL NAA+PROBE: NEGATIVE
SODIUM SERPL-SCNC: 137 MMOL/L (ref 136–145)
TROPONIN I SERPL DL<=0.01 NG/ML-MCNC: 0.01 NG/ML (ref 0–0.03)
WBC # BLD AUTO: 10.53 K/UL (ref 3.9–12.7)

## 2021-08-20 PROCEDURE — 99284 EMERGENCY DEPT VISIT MOD MDM: CPT | Mod: CS,,, | Performed by: EMERGENCY MEDICINE

## 2021-08-20 PROCEDURE — 83735 ASSAY OF MAGNESIUM: CPT | Performed by: EMERGENCY MEDICINE

## 2021-08-20 PROCEDURE — 85379 FIBRIN DEGRADATION QUANT: CPT | Performed by: EMERGENCY MEDICINE

## 2021-08-20 PROCEDURE — 99284 PR EMERGENCY DEPT VISIT,LEVEL IV: ICD-10-PCS | Mod: CS,,, | Performed by: EMERGENCY MEDICINE

## 2021-08-20 PROCEDURE — 99285 EMERGENCY DEPT VISIT HI MDM: CPT | Mod: 25

## 2021-08-20 PROCEDURE — 25000003 PHARM REV CODE 250: Performed by: EMERGENCY MEDICINE

## 2021-08-20 PROCEDURE — 93005 ELECTROCARDIOGRAM TRACING: CPT

## 2021-08-20 PROCEDURE — 83880 ASSAY OF NATRIURETIC PEPTIDE: CPT | Performed by: EMERGENCY MEDICINE

## 2021-08-20 PROCEDURE — 80053 COMPREHEN METABOLIC PANEL: CPT | Performed by: EMERGENCY MEDICINE

## 2021-08-20 PROCEDURE — 81025 URINE PREGNANCY TEST: CPT | Performed by: EMERGENCY MEDICINE

## 2021-08-20 PROCEDURE — 93010 ELECTROCARDIOGRAM REPORT: CPT | Mod: ,,, | Performed by: INTERNAL MEDICINE

## 2021-08-20 PROCEDURE — 85025 COMPLETE CBC W/AUTO DIFF WBC: CPT | Performed by: EMERGENCY MEDICINE

## 2021-08-20 PROCEDURE — 84484 ASSAY OF TROPONIN QUANT: CPT | Performed by: EMERGENCY MEDICINE

## 2021-08-20 PROCEDURE — U0002 COVID-19 LAB TEST NON-CDC: HCPCS | Performed by: EMERGENCY MEDICINE

## 2021-08-20 PROCEDURE — 93010 EKG 12-LEAD: ICD-10-PCS | Mod: ,,, | Performed by: INTERNAL MEDICINE

## 2021-08-20 RX ORDER — ACETAMINOPHEN 500 MG
1000 TABLET ORAL
Status: COMPLETED | OUTPATIENT
Start: 2021-08-20 | End: 2021-08-20

## 2021-08-20 RX ADMIN — ACETAMINOPHEN 1000 MG: 500 TABLET ORAL at 11:08

## 2021-12-14 ENCOUNTER — PATIENT MESSAGE (OUTPATIENT)
Dept: PEDIATRIC CARDIOLOGY | Facility: CLINIC | Age: 21
End: 2021-12-14
Payer: COMMERCIAL

## 2021-12-14 DIAGNOSIS — R23.0 CYANOSIS: ICD-10-CM

## 2021-12-14 DIAGNOSIS — Z98.890 S/P FONTAN PROCEDURE: ICD-10-CM

## 2021-12-14 DIAGNOSIS — Q23.4 HLHS (HYPOPLASTIC LEFT HEART SYNDROME): Primary | ICD-10-CM

## 2021-12-14 LAB
INFLUENZA A ANTIGEN, POC: NEGATIVE
INFLUENZA B ANTIGEN, POC: NEGATIVE
RAPID GROUP A STREP (OHS): NEGATIVE
SARS-COV-2 RNA RESP QL NAA+PROBE: NEGATIVE

## 2022-01-03 ENCOUNTER — PATIENT MESSAGE (OUTPATIENT)
Dept: OPTOMETRY | Facility: CLINIC | Age: 22
End: 2022-01-03
Payer: COMMERCIAL

## 2022-01-10 ENCOUNTER — PATIENT MESSAGE (OUTPATIENT)
Dept: OPTOMETRY | Facility: CLINIC | Age: 22
End: 2022-01-10
Payer: COMMERCIAL

## 2022-02-14 ENCOUNTER — OFFICE VISIT (OUTPATIENT)
Dept: OPTOMETRY | Facility: CLINIC | Age: 22
End: 2022-02-14
Payer: COMMERCIAL

## 2022-02-14 ENCOUNTER — OFFICE VISIT (OUTPATIENT)
Dept: PEDIATRIC CARDIOLOGY | Facility: CLINIC | Age: 22
End: 2022-02-14
Payer: COMMERCIAL

## 2022-02-14 ENCOUNTER — CLINICAL SUPPORT (OUTPATIENT)
Dept: PEDIATRIC CARDIOLOGY | Facility: CLINIC | Age: 22
End: 2022-02-14
Payer: COMMERCIAL

## 2022-02-14 ENCOUNTER — HOSPITAL ENCOUNTER (OUTPATIENT)
Dept: PEDIATRIC CARDIOLOGY | Facility: HOSPITAL | Age: 22
Discharge: HOME | End: 2022-02-14
Attending: PEDIATRICS
Payer: COMMERCIAL

## 2022-02-14 VITALS
DIASTOLIC BLOOD PRESSURE: 67 MMHG | SYSTOLIC BLOOD PRESSURE: 143 MMHG | WEIGHT: 175.94 LBS | BODY MASS INDEX: 26.66 KG/M2 | HEART RATE: 89 BPM | HEIGHT: 68 IN | OXYGEN SATURATION: 90 %

## 2022-02-14 DIAGNOSIS — Z98.890 S/P FONTAN PROCEDURE: ICD-10-CM

## 2022-02-14 DIAGNOSIS — Q23.4 HLHS (HYPOPLASTIC LEFT HEART SYNDROME): ICD-10-CM

## 2022-02-14 DIAGNOSIS — Q23.4 HLHS (HYPOPLASTIC LEFT HEART SYNDROME): Primary | ICD-10-CM

## 2022-02-14 DIAGNOSIS — K76.1 CHRONIC PASSIVE CONGESTION OF LIVER: ICD-10-CM

## 2022-02-14 DIAGNOSIS — Q27.30 AVM (ARTERIOVENOUS MALFORMATION): ICD-10-CM

## 2022-02-14 DIAGNOSIS — Q25.1 AORTA COARCTATION: ICD-10-CM

## 2022-02-14 DIAGNOSIS — H52.13 MYOPIA OF BOTH EYES: ICD-10-CM

## 2022-02-14 DIAGNOSIS — H47.333 CROWDED OPTIC DISC, BILATERAL: Primary | ICD-10-CM

## 2022-02-14 DIAGNOSIS — Z46.0 FITTING AND ADJUSTMENT OF SPECTACLES AND CONTACT LENSES: Primary | ICD-10-CM

## 2022-02-14 DIAGNOSIS — Z98.890 POST-FONTAN PROTEIN-LOSING ENTEROPATHY: ICD-10-CM

## 2022-02-14 DIAGNOSIS — R23.0 CYANOSIS: ICD-10-CM

## 2022-02-14 DIAGNOSIS — Q25.79: ICD-10-CM

## 2022-02-14 DIAGNOSIS — Q25.6 PULMONARY ARTERY STENOSIS OF CENTRAL BRANCH: ICD-10-CM

## 2022-02-14 DIAGNOSIS — H52.222 REGULAR ASTIGMATISM OF LEFT EYE: ICD-10-CM

## 2022-02-14 DIAGNOSIS — K90.49 POST-FONTAN PROTEIN-LOSING ENTEROPATHY: ICD-10-CM

## 2022-02-14 LAB — BSA FOR ECHO PROCEDURE: 1.95 M2

## 2022-02-14 PROCEDURE — 99999 PR PBB SHADOW E&M-EST. PATIENT-LVL III: ICD-10-PCS | Mod: PBBFAC,,, | Performed by: OPTOMETRIST

## 2022-02-14 PROCEDURE — 99215 PR OFFICE/OUTPT VISIT, EST, LEVL V, 40-54 MIN: ICD-10-PCS | Mod: 25,S$GLB,, | Performed by: PEDIATRICS

## 2022-02-14 PROCEDURE — 93000 ELECTROCARDIOGRAM COMPLETE: CPT | Mod: S$GLB,,, | Performed by: PEDIATRICS

## 2022-02-14 PROCEDURE — 93244 EXT ECG>48HR<7D REV&INTERPJ: CPT | Mod: ,,, | Performed by: PEDIATRICS

## 2022-02-14 PROCEDURE — 93303 PEDIATRIC ECHO (CUPID ONLY): ICD-10-PCS | Mod: 26,,, | Performed by: PEDIATRICS

## 2022-02-14 PROCEDURE — 99215 OFFICE O/P EST HI 40 MIN: CPT | Mod: 25,S$GLB,, | Performed by: PEDIATRICS

## 2022-02-14 PROCEDURE — 93000 EKG 12-LEAD PEDIATRIC: ICD-10-PCS | Mod: S$GLB,,, | Performed by: PEDIATRICS

## 2022-02-14 PROCEDURE — 92310 PR CONTACT LENS FITTING (NO CHANGE): ICD-10-PCS | Mod: CSM,,, | Performed by: OPTOMETRIST

## 2022-02-14 PROCEDURE — 92015 PR REFRACTION: ICD-10-PCS | Mod: S$GLB,,, | Performed by: OPTOMETRIST

## 2022-02-14 PROCEDURE — 93303 ECHO TRANSTHORACIC: CPT

## 2022-02-14 PROCEDURE — 99999 PR PBB SHADOW E&M-EST. PATIENT-LVL III: ICD-10-PCS | Mod: PBBFAC,,, | Performed by: PEDIATRICS

## 2022-02-14 PROCEDURE — 99999 PR PBB SHADOW E&M-EST. PATIENT-LVL III: CPT | Mod: PBBFAC,,, | Performed by: PEDIATRICS

## 2022-02-14 PROCEDURE — 99999 PR PBB SHADOW E&M-EST. PATIENT-LVL I: ICD-10-PCS | Mod: PBBFAC,,,

## 2022-02-14 PROCEDURE — 93303 ECHO TRANSTHORACIC: CPT | Mod: 26,,, | Performed by: PEDIATRICS

## 2022-02-14 PROCEDURE — 92014 COMPRE OPH EXAM EST PT 1/>: CPT | Mod: S$GLB,,, | Performed by: OPTOMETRIST

## 2022-02-14 PROCEDURE — 93325 PEDIATRIC ECHO (CUPID ONLY): ICD-10-PCS | Mod: 26,,, | Performed by: PEDIATRICS

## 2022-02-14 PROCEDURE — 93325 DOPPLER ECHO COLOR FLOW MAPG: CPT | Mod: 26,,, | Performed by: PEDIATRICS

## 2022-02-14 PROCEDURE — 93242 EXT ECG>48HR<7D RECORDING: CPT

## 2022-02-14 PROCEDURE — 92310 CONTACT LENS FITTING OU: CPT | Mod: CSM,,, | Performed by: OPTOMETRIST

## 2022-02-14 PROCEDURE — 92014 PR EYE EXAM, EST PATIENT,COMPREHESV: ICD-10-PCS | Mod: S$GLB,,, | Performed by: OPTOMETRIST

## 2022-02-14 PROCEDURE — 92015 DETERMINE REFRACTIVE STATE: CPT | Mod: S$GLB,,, | Performed by: OPTOMETRIST

## 2022-02-14 PROCEDURE — 93244 CV 3-14 DAY PEDIATRIC HOLTER MONITOR (CUPID ONLY): ICD-10-PCS | Mod: ,,, | Performed by: PEDIATRICS

## 2022-02-14 PROCEDURE — 99999 PR PBB SHADOW E&M-EST. PATIENT-LVL I: CPT | Mod: PBBFAC,,,

## 2022-02-14 PROCEDURE — 93320 PEDIATRIC ECHO (CUPID ONLY): ICD-10-PCS | Mod: 26,,, | Performed by: PEDIATRICS

## 2022-02-14 PROCEDURE — 99999 PR PBB SHADOW E&M-EST. PATIENT-LVL III: CPT | Mod: PBBFAC,,, | Performed by: OPTOMETRIST

## 2022-02-14 PROCEDURE — 93320 DOPPLER ECHO COMPLETE: CPT | Mod: 26,,, | Performed by: PEDIATRICS

## 2022-02-14 RX ORDER — FUROSEMIDE 20 MG/1
20 TABLET ORAL DAILY
Qty: 90 TABLET | Refills: 12 | Status: SHIPPED | OUTPATIENT
Start: 2022-02-14 | End: 2022-03-16

## 2022-02-14 RX ORDER — SPIRONOLACTONE 25 MG/1
25 TABLET ORAL DAILY
Qty: 60 TABLET | Refills: 11 | Status: SHIPPED | OUTPATIENT
Start: 2022-02-14 | End: 2022-07-05

## 2022-02-14 NOTE — PROGRESS NOTES
"HPI     Jil Lennon is a 21 y.o. female who is brought in by her mother,   Angie,  for continued eye care. Jil's last exam with me was on   10/23/2020. She has congenital heart disease which is treated with   sildenafil. She also has small, crowded optic nerves ("disc at risk")   which increase her risk of retinal vascular occlusion on sildenafil. She   has experienced blue entopic phenomena in the past. Today, she reports   that this still happens, however, it occurs much less frequently. As for   refractive error, Jil has high bilateral myopia for which  Dailies   Total 1 contact lenses are prescribed and are worn as her primary mode of   visual correction. Today, she states that she has not noticed any new or   concerning ocular or visual symptoms.      (--)blurred vision  (--)Headaches  (--)diplopia  (--)flashes  (+)floaters  (--)pain  (--)Itching  (--)tearing  (--)burning  (--)Dryness  (--) OTC Drops  (--)Photophobia          Last edited by Petar Mcelroy, OD on 2/14/2022 10:16 AM. (History)        Review of Systems   Constitutional: Negative for chills, fever and malaise/fatigue.   HENT: Negative for congestion, hearing loss and sore throat.    Eyes: Negative for blurred vision, double vision, photophobia, pain, discharge and redness.   Respiratory: Negative.  Negative for cough, shortness of breath and wheezing.    Cardiovascular: Negative.    Gastrointestinal: Negative.  Negative for nausea and vomiting.   Genitourinary: Negative.    Musculoskeletal: Negative.    Skin: Negative.    Neurological: Negative for seizures.   Psychiatric/Behavioral: Negative.        For exam results, see encounter report    Assessment /Plan     1. Crowded optic disc, bilateral --> Congential heart disease treated with sildenafil  - No vessel occlusions today  -  Explained increased risk of blood vessel occlusions - patient is to RTC or ED immediately with sudden loss of vision      2. High Bilateral Myopia, " Astigmatism of left eye  - Spec Rx per final Rx below  Glasses Prescription (2/14/2022)        Sphere Cylinder Axis Dist VA    Right -6.75 Sphere  20/20    Left -6.75 +0.75 105 20/20    Type: SVL    Expiration Date: 2/15/2023        - Precision 1 and Precision 1 for Astigmatism Trials dispensed as below for daily replacement   Advised against overnight wear, risks of overnight wear explained;    Systane Hydration, Biotrue Hydration Boost, Thera Tears, Refresh Optive Artificial tears for comfort prn;          Parent & Patient education; [facility]   RTC in 6 months for DFE/ optic nerve check     -------------------Addendum 4/6/22----------------------------  CLRx per below for daily disposal/replacement    -Advised against overnight wear, risks of overnight wear explained;     -Systane Balance, Soothe XP, Refresh Optive Advanced artificial tears for comfort prn;    -Optifree Puremoist solutions recommended        Contact Lens Prescription (2/14/2022)        Brand Base Curve Diameter Sphere Cylinder Axis    Right Precision1 8.3 14.2 -7.00 Sphere     Left Precision1 for Astigmatism 8.5 14.5 -6.00 -0.75 020    Expiration Date: 4/6/2023    Replacement: Daily    Solutions: OptiFree PureMoist    Wearing Schedule: Daily Wear              RTC in 6 months for DFE/ optic nerve check; ,Ok to instill 0.5% Tropicamide after baseline workup

## 2022-02-14 NOTE — PROGRESS NOTES
"Subjective:    Patient ID:  Jil Lennon is a 21 y.o. female who presents for  scheduled follow-up with no acute complaints. She has HLHS s/p staged palliation with late catheter based re-fenestration at 4 years of age for anasarca/PLE, coarctation stent, LPA stent and Fontan conduit stent (3/7/2019). She is doing very well overall now from a cardiac standpoint but has significant anxiety and eating disorder.     She has a history of abdominal complaints with small bowel bacterial overgrowth syndrome treated with antibiotics.     She has not had recent lower extremity swelling. She recently quit medications because of concerns about occasional dizziness. There was no marked change but she describes feeling "a little better" off meds.     Lluvia has had pleural effusions in the past and has very small diffuse pulmonary micro-AVMs, mild cyanosis and mild exercise intolerance. The fenestration remains of moderate size.The pulmonary micro-AVMs were less obvious on the recent cath and her pulmonary veins were fully saturated.     Several family members have thyroid problems.    She has headaches a few times per week.     Latest hepatology evaluation/follow up was unremarkable. She has an IUD.    Review of Systems   Constitutional: Negative.   HENT: Negative.    Eyes: Negative.    Cardiovascular: Positive for cyanosis.   Respiratory: Negative.    Endocrine: Negative.    Hematologic/Lymphatic: Negative.    Skin: Negative.    Musculoskeletal: Negative.    Gastrointestinal: Negative.    Genitourinary: Negative.    Neurological: Negative.    Psychiatric/Behavioral: Negative.    Allergic/Immunologic: Negative.         Objective:    Physical Exam  Constitutional:       General: She is not in acute distress.     Appearance: She is well-developed. She is not diaphoretic.      Comments: Mild cyanosis.   HENT:      Head: Normocephalic and atraumatic.      Right Ear: External ear normal.      Left Ear: External ear normal.      " Nose: Nose normal.      Mouth/Throat:      Pharynx: No oropharyngeal exudate.   Eyes:      General: No scleral icterus.        Right eye: No discharge.         Left eye: No discharge.      Conjunctiva/sclera: Conjunctivae normal.      Pupils: Pupils are equal, round, and reactive to light.   Neck:      Thyroid: No thyromegaly.      Vascular: No JVD.      Trachea: No tracheal deviation.   Cardiovascular:      Rate and Rhythm: Normal rate.      Pulses: Intact distal pulses.           Radial pulses are 2+ on the right side.        Femoral pulses are 2+ on the right side.     Heart sounds: S1 normal. Murmur heard.   High-pitched blowing holosystolic murmur is present with a grade of 1/6 at the lower left sternal border. Single S2   No friction rub. No gallop.    Pulmonary:      Effort: Pulmonary effort is normal. No respiratory distress.      Breath sounds: Normal breath sounds. No stridor. No wheezing or rales.   Chest:      Chest wall: No tenderness.   Abdominal:      General: Bowel sounds are normal. There is no distension.      Palpations: Abdomen is soft. There is no mass.      Tenderness: There is no abdominal tenderness. There is no guarding or rebound.   Musculoskeletal:         General: No tenderness. Normal range of motion.      Cervical back: Normal range of motion and neck supple.   Lymphadenopathy:      Cervical: No cervical adenopathy.   Skin:     General: Skin is warm and dry.      Coloration: Skin is not pale.      Findings: No erythema or rash.   Neurological:      Mental Status: She is alert and oriented to person, place, and time.      Cranial Nerves: No cranial nerve deficit.      Motor: No abnormal muscle tone.      Coordination: Coordination normal.   Psychiatric:         Behavior: Behavior normal.         Thought Content: Thought content normal.         Judgment: Judgment normal.     Sats 86%        Liver US (4/19/2021): normal/unremarkable (mild hepato-splenomegaly as expected post-Fontan).  Labs  (2/14/2022) unremarkable except continued mild erythrocythemia ([id number]) and improved total protein (8.1)    ECG: NSR, RVH  ECHO: Hypoplastic left heart syndrome s/p Fontan. LPA stent, coarctation stent.  No significant change from last echocardiogram.  Laminar flow with no obvious thrombus or obstruction in left innominate vein , right SVC, IVC , Fontan conduit, pulmonary  confluence, stented LPA, proximal RPA, Fontan anastomosis or Abel anastomosis.  LPA distal to stent with laminar flow by color doppler. .  Color doppler demonstrates Fontan fenestration with continuous flow to right atrium at peak velocity <1.5 m/sec. and mean  gradient <4.5 mmHg.  Unobstructed return of pulmonary venous return across large atrial communication to tricuspid valve  Dilated right ventricle, mild.  Thickened right ventricle free wall, moderate.  Qualitatively good systemic right ventricular systolic function, appears slightly improved.  Normal neoaortic valve velocity.  Mild neoaortic valve insufficiency.  Large ascending aorta post Jackie.  Stent noted in descending aorta with peak velocity <2.4 m/sec. and trivial diastolic run off.  No pericardial effusion.    Assessment:       1. HLHS (hypoplastic left heart syndrome), s/p fenestrated Fontan   2. Aorta coarctation, relieved with stent    3. Pulmonary artery stenosis of central branch, relieved with stent    4. Diffuse pulmonary micro-AVMs (arteriovenous malformation)    5. 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 Post-Fontan protein-losing enteropathy, resolved    7. S/P Fontan procedure    8. S/P Fontan conduit stent   9. History of palpitations, quiescent   10. Anxiety/Depression        Plan:       1. I reviewed today's findings in detail. We reviewed adult issues with CHD in detail including overall cardiac health, mortality issues, anticoagulation, contraception and pregnancy.  2. Discontinue enalapril.  3. Same medications otherwise (digoxin, lasix, sildenafil,  aldactone, aspirin). We discussed changing aspirin to NOAC but decided to keep aspirin (only) in part because she has a stent crossing the fenestration that could potentially act as a filter and because of her concerns about potentially increased bleeding complications.  4. SBE precautions prn.  5. Treat as normal from a cardiac standpoint, encouraged increased exercise.  6. Continue with transition to [facility] process, goal to transfer in 1-2 years.  7. Recheck in 3 months with ECG and ECHO.  8. Holter and labs today, phone follow up.  9. Follow up with Dr. Ac for liver evaluation yearly.

## 2022-03-02 LAB
OHS CV EVENT MONITOR DAY: 1
OHS CV HOLTER HOOKUP DATE: NORMAL
OHS CV HOLTER HOOKUP TIME: NORMAL
OHS CV HOLTER LENGTH DECIMAL HOURS: 33.82
OHS CV HOLTER LENGTH HOURS: 9
OHS CV HOLTER LENGTH MINUTES: 49
OHS CV HOLTER SCAN DATE: NORMAL
OHS CV HOLTER SINUS AVERAGE HR: 81 BPM
OHS CV HOLTER SINUS MAX HR: 136 BPM
OHS CV HOLTER SINUS MIN HR: 51 BPM
OHS CV HOLTER STUDY END DATE: NORMAL
OHS CV HOLTER STUDY END TIME: NORMAL

## 2022-04-06 ENCOUNTER — PATIENT MESSAGE (OUTPATIENT)
Dept: OPTOMETRY | Facility: CLINIC | Age: 22
End: 2022-04-06
Payer: COMMERCIAL

## 2022-04-07 ENCOUNTER — PATIENT MESSAGE (OUTPATIENT)
Dept: PEDIATRIC CARDIOLOGY | Facility: CLINIC | Age: 22
End: 2022-04-07
Payer: COMMERCIAL

## 2022-04-07 DIAGNOSIS — Q27.30 AVM (ARTERIOVENOUS MALFORMATION): ICD-10-CM

## 2022-04-07 DIAGNOSIS — Q23.4 HLHS (HYPOPLASTIC LEFT HEART SYNDROME): Primary | ICD-10-CM

## 2022-04-11 ENCOUNTER — PATIENT MESSAGE (OUTPATIENT)
Dept: PEDIATRIC CARDIOLOGY | Facility: CLINIC | Age: 22
End: 2022-04-11
Payer: COMMERCIAL

## 2022-04-11 ENCOUNTER — HISTORICAL (OUTPATIENT)
Dept: ADMINISTRATIVE | Facility: HOSPITAL | Age: 22
End: 2022-04-11
Payer: COMMERCIAL

## 2022-04-28 VITALS
SYSTOLIC BLOOD PRESSURE: 105 MMHG | OXYGEN SATURATION: 85 % | HEIGHT: 67 IN | DIASTOLIC BLOOD PRESSURE: 59 MMHG | BODY MASS INDEX: 25.96 KG/M2 | WEIGHT: 165.38 LBS

## 2022-05-11 ENCOUNTER — OFFICE VISIT (OUTPATIENT)
Dept: OBSTETRICS AND GYNECOLOGY | Facility: CLINIC | Age: 22
End: 2022-05-11
Payer: COMMERCIAL

## 2022-05-11 VITALS
SYSTOLIC BLOOD PRESSURE: 110 MMHG | BODY MASS INDEX: 28.88 KG/M2 | WEIGHT: 188.63 LBS | DIASTOLIC BLOOD PRESSURE: 78 MMHG

## 2022-05-11 DIAGNOSIS — Z30.431 ENCOUNTER FOR ROUTINE CHECKING OF INTRAUTERINE CONTRACEPTIVE DEVICE (IUD): ICD-10-CM

## 2022-05-11 DIAGNOSIS — Z11.3 VENEREAL DISEASE SCREENING: ICD-10-CM

## 2022-05-11 DIAGNOSIS — Z01.419 VISIT FOR GYNECOLOGIC EXAMINATION: Primary | ICD-10-CM

## 2022-05-11 PROCEDURE — 87801 DETECT AGNT MULT DNA AMPLI: CPT | Performed by: OBSTETRICS & GYNECOLOGY

## 2022-05-11 PROCEDURE — 99999 PR PBB SHADOW E&M-EST. PATIENT-LVL III: CPT | Mod: PBBFAC,,, | Performed by: OBSTETRICS & GYNECOLOGY

## 2022-05-11 PROCEDURE — 87591 N.GONORRHOEAE DNA AMP PROB: CPT | Performed by: OBSTETRICS & GYNECOLOGY

## 2022-05-11 PROCEDURE — 99395 PREV VISIT EST AGE 18-39: CPT | Mod: S$GLB,,, | Performed by: OBSTETRICS & GYNECOLOGY

## 2022-05-11 PROCEDURE — 87481 CANDIDA DNA AMP PROBE: CPT | Mod: 59 | Performed by: OBSTETRICS & GYNECOLOGY

## 2022-05-11 PROCEDURE — 99999 PR PBB SHADOW E&M-EST. PATIENT-LVL III: ICD-10-PCS | Mod: PBBFAC,,, | Performed by: OBSTETRICS & GYNECOLOGY

## 2022-05-11 PROCEDURE — 87491 CHLMYD TRACH DNA AMP PROBE: CPT | Performed by: OBSTETRICS & GYNECOLOGY

## 2022-05-11 PROCEDURE — 99395 PR PREVENTIVE VISIT,EST,18-39: ICD-10-PCS | Mod: S$GLB,,, | Performed by: OBSTETRICS & GYNECOLOGY

## 2022-05-11 RX ORDER — SPIRONOLACTONE 25 MG/1
25 TABLET ORAL
COMMUNITY
Start: 2021-12-14 | End: 2022-05-30 | Stop reason: SDUPTHER

## 2022-05-11 RX ORDER — ARIPIPRAZOLE 2 MG/1
2 TABLET ORAL DAILY
COMMUNITY
Start: 2022-04-18 | End: 2022-05-30

## 2022-05-11 RX ORDER — FUROSEMIDE 20 MG/1
20 TABLET ORAL
COMMUNITY
Start: 2021-12-14 | End: 2022-07-05
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
## 2022-05-13 LAB
C TRACH DNA SPEC QL NAA+PROBE: NOT DETECTED
N GONORRHOEA DNA SPEC QL NAA+PROBE: NOT DETECTED

## 2022-05-16 LAB
BACTERIAL VAGINOSIS DNA: NEGATIVE
CANDIDA GLABRATA DNA: NEGATIVE
CANDIDA KRUSEI DNA: NEGATIVE
CANDIDA RRNA VAG QL PROBE: POSITIVE
T VAGINALIS RRNA GENITAL QL PROBE: NEGATIVE

## 2022-05-27 ENCOUNTER — PATIENT MESSAGE (OUTPATIENT)
Dept: OBSTETRICS AND GYNECOLOGY | Facility: CLINIC | Age: 22
End: 2022-05-27
Payer: COMMERCIAL

## 2022-05-30 ENCOUNTER — CLINICAL SUPPORT (OUTPATIENT)
Dept: PEDIATRIC CARDIOLOGY | Facility: CLINIC | Age: 22
End: 2022-05-30
Payer: COMMERCIAL

## 2022-05-30 ENCOUNTER — OFFICE VISIT (OUTPATIENT)
Dept: PEDIATRIC CARDIOLOGY | Facility: CLINIC | Age: 22
End: 2022-05-30
Payer: COMMERCIAL

## 2022-05-30 ENCOUNTER — HOSPITAL ENCOUNTER (OUTPATIENT)
Dept: PEDIATRIC CARDIOLOGY | Facility: HOSPITAL | Age: 22
Discharge: HOME | End: 2022-05-30
Attending: PEDIATRICS
Payer: COMMERCIAL

## 2022-05-30 VITALS
HEIGHT: 68 IN | SYSTOLIC BLOOD PRESSURE: 153 MMHG | WEIGHT: 180.13 LBS | DIASTOLIC BLOOD PRESSURE: 74 MMHG | OXYGEN SATURATION: 87 % | HEART RATE: 85 BPM | BODY MASS INDEX: 27.3 KG/M2

## 2022-05-30 DIAGNOSIS — Z98.890 S/P FONTAN PROCEDURE: ICD-10-CM

## 2022-05-30 DIAGNOSIS — F60.3 BORDERLINE PERSONALITY DISORDER IN ADOLESCENT: ICD-10-CM

## 2022-05-30 DIAGNOSIS — Z98.890 POST-FONTAN PROTEIN-LOSING ENTEROPATHY: ICD-10-CM

## 2022-05-30 DIAGNOSIS — Q25.1 AORTA COARCTATION: ICD-10-CM

## 2022-05-30 DIAGNOSIS — Q23.4 HLHS (HYPOPLASTIC LEFT HEART SYNDROME): ICD-10-CM

## 2022-05-30 DIAGNOSIS — Z98.890 PERSONAL HISTORY OF SURGERY TO HEART AND GREAT VESSELS, PRESENTING HAZARDS TO HEALTH: ICD-10-CM

## 2022-05-30 DIAGNOSIS — Q25.6 PULMONARY ARTERY STENOSIS OF CENTRAL BRANCH: ICD-10-CM

## 2022-05-30 DIAGNOSIS — K90.49 POST-FONTAN PROTEIN-LOSING ENTEROPATHY: ICD-10-CM

## 2022-05-30 DIAGNOSIS — Q27.30 AVM (ARTERIOVENOUS MALFORMATION): ICD-10-CM

## 2022-05-30 DIAGNOSIS — F32.5 MAJOR DEPRESSIVE DISORDER IN REMISSION, UNSPECIFIED WHETHER RECURRENT: ICD-10-CM

## 2022-05-30 DIAGNOSIS — Q23.4 HLHS (HYPOPLASTIC LEFT HEART SYNDROME): Primary | ICD-10-CM

## 2022-05-30 LAB — BSA FOR ECHO PROCEDURE: 1.98 M2

## 2022-05-30 PROCEDURE — 93304 PEDIATRIC ECHO (CUPID ONLY): ICD-10-PCS | Mod: 26,,, | Performed by: PEDIATRICS

## 2022-05-30 PROCEDURE — 93325 DOPPLER ECHO COLOR FLOW MAPG: CPT | Mod: 26,,, | Performed by: PEDIATRICS

## 2022-05-30 PROCEDURE — 99999 PR PBB SHADOW E&M-EST. PATIENT-LVL III: ICD-10-PCS | Mod: PBBFAC,,, | Performed by: PEDIATRICS

## 2022-05-30 PROCEDURE — 93304 ECHO TRANSTHORACIC: CPT | Mod: 26,,, | Performed by: PEDIATRICS

## 2022-05-30 PROCEDURE — 99999 PR PBB SHADOW E&M-EST. PATIENT-LVL I: CPT | Mod: PBBFAC,,,

## 2022-05-30 PROCEDURE — 93321 DOPPLER ECHO F-UP/LMTD STD: CPT

## 2022-05-30 PROCEDURE — 99215 PR OFFICE/OUTPT VISIT, EST, LEVL V, 40-54 MIN: ICD-10-PCS | Mod: 25,S$GLB,, | Performed by: PEDIATRICS

## 2022-05-30 PROCEDURE — 93325 DOPPLER ECHO COLOR FLOW MAPG: CPT

## 2022-05-30 PROCEDURE — 99999 PR PBB SHADOW E&M-EST. PATIENT-LVL I: ICD-10-PCS | Mod: PBBFAC,,,

## 2022-05-30 PROCEDURE — 93325 PEDIATRIC ECHO (CUPID ONLY): ICD-10-PCS | Mod: 26,,, | Performed by: PEDIATRICS

## 2022-05-30 PROCEDURE — 99999 PR PBB SHADOW E&M-EST. PATIENT-LVL III: CPT | Mod: PBBFAC,,, | Performed by: PEDIATRICS

## 2022-05-30 PROCEDURE — 93321 PEDIATRIC ECHO (CUPID ONLY): ICD-10-PCS | Mod: 26,,, | Performed by: PEDIATRICS

## 2022-05-30 PROCEDURE — 93000 EKG 12-LEAD PEDIATRIC: ICD-10-PCS | Mod: S$GLB,,, | Performed by: PEDIATRICS

## 2022-05-30 PROCEDURE — 93000 ELECTROCARDIOGRAM COMPLETE: CPT | Mod: S$GLB,,, | Performed by: PEDIATRICS

## 2022-05-30 PROCEDURE — 99215 OFFICE O/P EST HI 40 MIN: CPT | Mod: 25,S$GLB,, | Performed by: PEDIATRICS

## 2022-05-30 PROCEDURE — 93321 DOPPLER ECHO F-UP/LMTD STD: CPT | Mod: 26,,, | Performed by: PEDIATRICS

## 2022-05-30 RX ORDER — CARIPRAZINE 1.5 MG/1
1 CAPSULE, GELATIN COATED ORAL DAILY
COMMUNITY
Start: 2022-05-25 | End: 2024-02-19 | Stop reason: DRUGHIGH

## 2022-05-30 NOTE — PROGRESS NOTES
"Subjective:    Patient ID:  Jil Lennon is a 21 y.o. female who presents for  scheduled follow-up with no acute complaints. She came today with a friend, not with mom and maternal grandmom which she is happy about and feels is a big step. She recently started Vraylar and stopped Abilify. She is "feeling great" and is working out frequently. Still has occasional dizziness. She has HLHS s/p staged palliation with late catheter based re-fenestration at 4 years of age for anasarca/PLE, coarctation stent, LPA stent and Fontan conduit stent (3/7/2019). She is doing very well overall now from a cardiac standpoint but has significant anxiety and eating disorder.     She has a history of abdominal complaints with small bowel bacterial overgrowth syndrome treated with antibiotics.     She has not had recent lower extremity swelling.      Lluvia has had pleural effusions in the past and has very small diffuse pulmonary micro-AVMs, mild cyanosis and mild exercise intolerance. The fenestration remains of moderate size.The pulmonary micro-AVMs were less obvious on the recent cath and her pulmonary veins were fully saturated.     Several family members have thyroid problems.     Latest hepatology evaluation/follow up was unremarkable.     Review of Systems   Constitutional: Negative.   HENT: Negative.    Eyes: Negative.    Cardiovascular: Positive for cyanosis.   Respiratory: Negative.    Endocrine: Negative.    Hematologic/Lymphatic: Negative.    Skin: Negative.    Musculoskeletal: Negative.    Gastrointestinal: Negative.    Genitourinary: Negative.    Neurological: Negative.    Psychiatric/Behavioral: Negative.    Allergic/Immunologic: Negative.         Objective:    Physical Exam  Constitutional:       General: She is not in acute distress.     Appearance: She is well-developed. She is not diaphoretic.      Comments: Mild cyanosis.   HENT:      Head: Normocephalic and atraumatic.      Right Ear: External ear normal.      " Left Ear: External ear normal.      Nose: Nose normal.      Mouth/Throat:      Pharynx: No oropharyngeal exudate.   Eyes:      General: No scleral icterus.        Right eye: No discharge.         Left eye: No discharge.      Conjunctiva/sclera: Conjunctivae normal.      Pupils: Pupils are equal, round, and reactive to light.   Neck:      Thyroid: No thyromegaly.      Vascular: No JVD.      Trachea: No tracheal deviation.   Cardiovascular:      Rate and Rhythm: Normal rate.      Pulses: Intact distal pulses.           Radial pulses are 2+ on the right side.        Femoral pulses are 2+ on the right side.     Heart sounds: S1 normal. Murmur heard.   High-pitched blowing holosystolic murmur is present with a grade of 1/6 at the lower left sternal border. Single S2     No friction rub. No gallop.   Pulmonary:      Effort: Pulmonary effort is normal. No respiratory distress.      Breath sounds: Normal breath sounds. No stridor. No wheezing or rales.   Chest:      Chest wall: No tenderness.   Abdominal:      General: Bowel sounds are normal. There is no distension.      Palpations: Abdomen is soft. There is no mass.      Tenderness: There is no abdominal tenderness. There is no guarding or rebound.   Musculoskeletal:         General: No tenderness. Normal range of motion.      Cervical back: Normal range of motion and neck supple.   Lymphadenopathy:      Cervical: No cervical adenopathy.   Skin:     General: Skin is warm and dry.      Coloration: Skin is not pale.      Findings: No erythema or rash.   Neurological:      Mental Status: She is alert and oriented to person, place, and time.      Cranial Nerves: No cranial nerve deficit.      Motor: No abnormal muscle tone.      Coordination: Coordination normal.   Psychiatric:         Behavior: Behavior normal.         Thought Content: Thought content normal.         Judgment: Judgment normal.     Sats 87%        Liver US (4/19/2021): normal/unremarkable (mild  hepato-splenomegaly as expected post-Fontan).  Labs (2/14/2022) unremarkable except continued mild erythrocythemia ([id number]) and improved total protein (8.1)    ECG: NSR, RVH  ECHO: Hypoplastic left heart syndrome s/p Fontan. LPA stent, coarctation stent.  No significant change from last echocardiogram.  Laminar flow with no obvious thrombus or obstruction in left innominate vein , right SVC, IVC , Fontan conduit, pulmonary  confluence, stented LPA, proximal RPA, Fontan anastomosis or Abel anastomosis.  LPA distal to stent with laminar flow by color doppler. .  Color doppler demonstrates Fontan fenestration with continuous flow to right atrium at peak velocity <1.5 m/sec. and mean  gradient <4.5 mmHg.  Unobstructed return of pulmonary venous return across large atrial communication to tricuspid valve  Dilated right ventricle, mild.  Thickened right ventricle free wall, moderate.  Qualitatively good systemic right ventricular systolic function, appears slightly improved.  Normal neoaortic valve velocity.  Mild neoaortic valve insufficiency.  Large ascending aorta post Jackie.  Stent noted in descending aorta with peak velocity <2.4 m/sec. and trivial diastolic run off.  No pericardial effusion.    Assessment:       1. HLHS (hypoplastic left heart syndrome), s/p fenestrated Fontan   2. Aorta coarctation, relieved with stent    3. Pulmonary artery stenosis of central branch, relieved with stent    4. Diffuse pulmonary micro-AVMs (arteriovenous malformation)    5. 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 Post-Fontan protein-losing enteropathy, resolved    7. S/P Fontan procedure    8. S/P Fontan conduit stent   9. History of palpitations, quiescent   10. Anxiety/Depression        Plan:       1. I reviewed today's findings in detail. We reviewed adult issues with CHD in detail including overall cardiac health, mortality issues, anticoagulation, contraception and pregnancy. Ordered labs that she requested from  GYN visit.  2. Same medications (digoxin, lasix, sildenafil, aldactone, aspirin). We discussed changing aspirin to NOAC but decided to keep aspirin (only) in part because she has a stent crossing the fenestration that could potentially act as a filter and because of her concerns about potentially increased bleeding complications.  3. SBE precautions prn.  4. Treat as normal from a cardiac standpoint, encouraged increased exercise.  5. Continue with transition to ACHD process, goal to transfer in 1-2 years.  6. Recheck in 6 months with ECG, ECHO and Holter, CBC, CMP, TFTs (T4/TSH).  7. Follow up with Dr. Ac for liver evaluation yearly.

## 2022-06-06 ENCOUNTER — OFFICE VISIT (OUTPATIENT)
Dept: URGENT CARE | Facility: CLINIC | Age: 22
End: 2022-06-06
Payer: COMMERCIAL

## 2022-06-06 VITALS
DIASTOLIC BLOOD PRESSURE: 79 MMHG | RESPIRATION RATE: 18 BRPM | WEIGHT: 180 LBS | BODY MASS INDEX: 28.25 KG/M2 | SYSTOLIC BLOOD PRESSURE: 138 MMHG | TEMPERATURE: 98 F | OXYGEN SATURATION: 82 % | HEIGHT: 67 IN | HEART RATE: 91 BPM

## 2022-06-06 DIAGNOSIS — M79.641 RIGHT HAND PAIN: Primary | ICD-10-CM

## 2022-06-06 PROCEDURE — 99213 PR OFFICE/OUTPT VISIT, EST, LEVL III, 20-29 MIN: ICD-10-PCS | Mod: ,,, | Performed by: PHYSICIAN ASSISTANT

## 2022-06-06 PROCEDURE — 99213 OFFICE O/P EST LOW 20 MIN: CPT | Mod: ,,, | Performed by: PHYSICIAN ASSISTANT

## 2022-06-06 NOTE — PROGRESS NOTES
"Subjective:       Patient ID: Jil Lennon is a 21 y.o. female.    Vitals:  height is 5' 7" (1.702 m) and weight is 81.6 kg (180 lb). Her oral temperature is 97.8 °F (36.6 °C). Her blood pressure is 138/79 and her pulse is 91. Her respiration is 18 and oxygen saturation is 82% (abnormal).     Chief Complaint: Hand Injury    Right knuckle pain after punching a tree yesterday. C/o localized pain to the right 3rd MCP joint.     Hand Injury       ROS    Objective:      Physical Exam   HENT:   Head: Normocephalic and atraumatic.   Nose: Nose normal.   Neck: Neck supple.   Pulmonary/Chest: Effort normal.   Abdominal: Normal appearance.   Musculoskeletal: Normal range of motion.         General: Swelling and tenderness present. Normal range of motion.   Neurological: She is alert.   Skin: Skin is no rash. lesion   Right upper extremity:  There is a superficial abrasion mild swelling and TTP noted to the 3rd MCP joint area.  No noted tenderness elsewhere in the hand wrist or forearm.      Assessment:       1. Right hand pain          Plan:         Right hand pain  -     X-Ray Hand 3 view Right; Future; Expected date: 06/06/2022        Hand rest.    Follow-up with orthopedics if needed..     Go to emergency department with any significant change or worsening symptoms.    Rest Ice Elevate                 "

## 2022-06-06 NOTE — PATIENT INSTRUCTIONS
Hand rest.    Follow-up with orthopedics if needed..     Go to emergency department with any significant change or worsening symptoms.    Rest Ice Elevate

## 2022-06-23 ENCOUNTER — OFFICE VISIT (OUTPATIENT)
Dept: URGENT CARE | Facility: CLINIC | Age: 22
End: 2022-06-23
Payer: COMMERCIAL

## 2022-06-23 VITALS
TEMPERATURE: 98 F | DIASTOLIC BLOOD PRESSURE: 85 MMHG | HEIGHT: 67 IN | RESPIRATION RATE: 17 BRPM | BODY MASS INDEX: 28.25 KG/M2 | HEART RATE: 82 BPM | OXYGEN SATURATION: 85 % | WEIGHT: 180 LBS | SYSTOLIC BLOOD PRESSURE: 122 MMHG

## 2022-06-23 DIAGNOSIS — R05.9 COUGH: Primary | ICD-10-CM

## 2022-06-23 DIAGNOSIS — J06.9 UPPER RESPIRATORY TRACT INFECTION, UNSPECIFIED TYPE: ICD-10-CM

## 2022-06-23 LAB
CTP QC/QA: YES
CTP QC/QA: YES
POC MOLECULAR INFLUENZA A AGN: NEGATIVE
POC MOLECULAR INFLUENZA B AGN: NEGATIVE
SARS-COV-2 RDRP RESP QL NAA+PROBE: NEGATIVE

## 2022-06-23 PROCEDURE — 99213 OFFICE O/P EST LOW 20 MIN: CPT | Mod: ,,, | Performed by: FAMILY MEDICINE

## 2022-06-23 PROCEDURE — 99213 PR OFFICE/OUTPT VISIT, EST, LEVL III, 20-29 MIN: ICD-10-PCS | Mod: ,,, | Performed by: FAMILY MEDICINE

## 2022-06-23 PROCEDURE — 87502 INFLUENZA DNA AMP PROBE: CPT | Mod: QW,,, | Performed by: FAMILY MEDICINE

## 2022-06-23 PROCEDURE — U0002 COVID-19 LAB TEST NON-CDC: HCPCS | Mod: QW,,, | Performed by: FAMILY MEDICINE

## 2022-06-23 PROCEDURE — 87502 POCT INFLUENZA A/B MOLECULAR: ICD-10-PCS | Mod: QW,,, | Performed by: FAMILY MEDICINE

## 2022-06-23 PROCEDURE — U0002: ICD-10-PCS | Mod: QW,,, | Performed by: FAMILY MEDICINE

## 2022-06-23 NOTE — PATIENT INSTRUCTIONS
COVID negative flu negative  As discuss given your her condition recommend retesting for COVID tomorrow as a nurse visit.  Start taking an allergy pill daily such as claritin, zyrtec, allegrea or xyzal. Also start using a nasal steroid spray such as flonase or nasacort daily. If you are not being treated for high blood pressure, you can also take decongestant such as sudafed as needed. They can be purchased over the counter. Monitor for fever. Take tylenol/acetaminophen or ibuprofen as needed. Rest and hydrate. If symptoms persist or worsen, return to clinic or seek medical attention immediately.

## 2022-06-23 NOTE — PROGRESS NOTES
"Subjective:       Patient ID: Jil Lennon is a 21 y.o. female.    Vitals:  height is 5' 7" (1.702 m) and weight is 81.6 kg (180 lb). Her temperature is 98.4 °F (36.9 °C). Her blood pressure is 122/85 and her pulse is 82. Her respiration is 17 and oxygen saturation is 85% (abnormal).     Chief Complaint: Headache (X yesterday ), Nasal Congestion, Cough, Sore Throat, Nausea, and Fatigue    21 y.o. female presents to clinic with headache,congestion, cough, sore throat, nausea, and fatigue since yesterday.  Patient denies any fever.  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  Denies any vomiting or diarrhea.  Patient is not COVID vaccinated    Headache   This is a new problem. The current episode started yesterday. Associated symptoms include coughing, nausea and a sore throat.   Cough  This is a new problem. The current episode started yesterday. The cough is productive of sputum. Associated symptoms include headaches and a sore throat.   Sore Throat   This is a new problem. The current episode started yesterday. Associated symptoms include coughing and headaches.   Nausea  This is a new problem. The current episode started yesterday. Associated symptoms include coughing, fatigue, headaches, nausea and a sore throat.   Fatigue  This is a new problem. The current episode started yesterday. Associated symptoms include coughing, fatigue, headaches, nausea and a sore throat.       Constitution: Positive for fatigue.   HENT: Positive for sore throat.    Cardiovascular: Negative.    Eyes: Negative.    Respiratory: Positive for cough.    Gastrointestinal: Positive for nausea.   Genitourinary: Negative.    Musculoskeletal: Negative.    Skin: Negative.    Allergic/Immunologic: Negative.    Neurological: Positive for headaches.   Hematologic/Lymphatic: Negative.        Objective:      Physical Exam   Constitutional: She is oriented to person, place, and time.   HENT: "   Mouth/Throat: No posterior oropharyngeal erythema (Postnasal drip).   Eyes: Conjunctivae are normal.   Pulmonary/Chest: Effort normal and breath sounds normal. No stridor. No respiratory distress. She has no wheezes. She has no rhonchi. She has no rales.   Abdominal: Normal appearance.   Neurological: She is alert and oriented to person, place, and time.   Psychiatric: Her behavior is normal. Mood, judgment and thought content normal.   Vitals reviewed.          Previous History      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     to [place]    Dyspnea     Encounter for blood transfusion     Fracture of wrist     x4    General anesthetics causing adverse effect in therapeutic use     was mean as a child when waking up after tonsillectomy    Hypoplastic left heart     Liver disease     enlarged liver    Obstructive sleep apnea     resolved by T&A    Obstructive sleep apnea (adult) (pediatric)     Post-Fontan protein-losing enteropathy     Stroke     mother states possibly had stroke at 4 y/o    Tonsillar and adenoid hypertrophy      Current Outpatient Medications   Medication Instructions    aspirin 81 mg, Oral, Nightly    digoxin (LANOXIN) 125 mcg tablet TAKE 1 TABLET BY MOUTH EVERY DAY IN THE EVENING    furosemide (LASIX) 20 mg, Oral    sertraline (ZOLOFT) 50 mg, Oral, Daily    sildenafil (REVATIO) 20 mg, Oral, 2 times daily, (pt taking twice a day)    spironolactone (ALDACTONE) 25 mg, Oral, Daily    VRAYLAR 1.5 mg Cap 1 capsule, Oral, Daily     Past Surgical History:   Procedure Laterality Date    BIDIRECTIONAL JOSE W/ ATRIAL SEPTECTOMY      [facility]    CARDIAC SURGERY      [facility]/11/[facility]    COARCTATION STENT  3/9/11    COLONOSCOPY  N/A 5/27/2021    Procedure: COLONOSCOPY;  Surgeon: Benigno Bowers MD;  Location: [facility] ENDO (2ND FLR);  Service: Endoscopy;  Laterality: N/A;    COMBINED RIGHT AND RETROGRADE LEFT HEART CATHETERIZATION FOR CONGENITAL HEART DEFECT N/A 3/7/2019    Procedure: CATHETERIZATION, HEART, COMBINED RIGHT AND RETROGRADE LEFT, FOR CONGENITAL HEART DEFECT;  Surgeon: Jimi Pollard Jr., MD;  Location: [facility] CATH LAB;  Service: Cardiology;  Laterality: N/A;  ped heart    ESOPHAGOGASTRODUODENOSCOPY N/A 5/27/2021    Procedure: EGD (ESOPHAGOGASTRODUODENOSCOPY);  Surgeon: Benigno Bowers MD;  Location: [facility] ENDO (2ND FLR);  Service: Endoscopy;  Laterality: N/A;  Patient will need pediatric heart anesthesiologist due to history of Fontan     mother states having formed stool         C[facility] at [facility] on 5/[facility]-GT    FONTAN PROCEDURE, EXTRACARDIAC  9/27/2004    [facility]    LPA     RE CONSTRUCTION      [facility]    LPA STENT  2/26/.2009    [facility]    nati/ mitzi  age 3 days     done at [facility]    TONSILLECTOMY, ADENOIDECTOMY  11/2011    WISDOM TOOTH EXTRACTION       Family History   Problem Relation Age of Onset    No Known Problems Father     Urologic Abnormality Other     Thyroid disease Mother     No Known Problems Maternal Grandmother     Hypertension Maternal Grandfather     Skin cancer Maternal Grandfather     Hypertension Paternal Grandmother     Glaucoma Paternal Grandmother     No Known Problems Sister     No Known Problems Brother     No Known Problems Maternal Aunt     No Known Problems Maternal Uncle     No Known Problems Paternal Aunt     No Known Problems Paternal Uncle     No Known Problems Paternal Grandfather     Cancer Other 80        colon    Heart disease Neg Hx     Diabetes Neg Hx     Retinal detachment Neg Hx     Amblyopia Neg Hx     Blindness Neg Hx     Strabismus Neg Hx     Macular degeneration Neg Hx     Stroke Neg Hx     Breast cancer Neg Hx     Ovarian cancer Neg Hx   "   Esophageal cancer Neg Hx        Social History     Tobacco Use    Smoking status: Current Every Day Smoker     Types: Vaping with nicotine    Smokeless tobacco: Never Used    Tobacco comment: No TAMMY   Substance Use Topics    Alcohol use: Yes     Comment: occassional    Drug use: No        Physical Exam      Vital Signs Reviewed   /85   Pulse 82   Temp 98.4 °F (36.9 °C)   Resp 17   Ht 5' 7" (1.702 m)   Wt 81.6 kg (180 lb)   SpO2 (!) 85%   BMI 28.19 kg/m²        Procedures    Procedures     Labs     Results for orders placed or performed in visit on 06/23/22   POCT COVID-19 Rapid Screening   Result Value Ref Range    POC Rapid COVID Negative Negative     Acceptable Yes    POCT Influenza A/B MOLECULAR   Result Value Ref Range    POC Molecular Influenza A Ag Negative Negative, Not Reported    POC Molecular Influenza B Ag Negative Negative, Not Reported     Acceptable Yes          Assessment:       1. Cough    2. Upper respiratory tract infection, unspecified type          Plan:       COVID negative flu negative  As discuss given your her condition recommend retesting for COVID tomorrow as a nurse visit.  Start taking an allergy pill daily such as claritin, zyrtec, allegrea or xyzal. Also start using a nasal steroid spray such as flonase or nasacort daily. If you are not being treated for high blood pressure, you can also take decongestant such as sudafed as needed. They can be purchased over the counter. Monitor for fever. Take tylenol/acetaminophen or ibuprofen as needed. Rest and hydrate. If symptoms persist or worsen, return to clinic or seek medical attention immediately.         Cough  -     POCT COVID-19 Rapid Screening  -     POCT Influenza A/B MOLECULAR    Upper respiratory tract infection, unspecified type                     "

## 2022-06-24 ENCOUNTER — CLINICAL SUPPORT (OUTPATIENT)
Dept: URGENT CARE | Facility: CLINIC | Age: 22
End: 2022-06-24
Payer: COMMERCIAL

## 2022-06-24 VITALS — RESPIRATION RATE: 20 BRPM

## 2022-06-24 DIAGNOSIS — R52 BODY ACHES: Primary | ICD-10-CM

## 2022-06-24 LAB
CTP QC/QA: YES
CTP QC/QA: YES
FLUAV AG NPH QL: NEGATIVE
FLUBV AG NPH QL: NEGATIVE
SARS-COV-2 RDRP RESP QL NAA+PROBE: NEGATIVE

## 2022-06-24 PROCEDURE — U0002 COVID-19 LAB TEST NON-CDC: HCPCS | Mod: QW,,, | Performed by: PHYSICIAN ASSISTANT

## 2022-06-24 PROCEDURE — 87804 INFLUENZA ASSAY W/OPTIC: CPT | Mod: QW,,, | Performed by: PHYSICIAN ASSISTANT

## 2022-06-24 PROCEDURE — U0002: ICD-10-PCS | Mod: QW,,, | Performed by: PHYSICIAN ASSISTANT

## 2022-06-24 PROCEDURE — 87804 POCT INFLUENZA A/B: ICD-10-PCS | Mod: QW,,, | Performed by: PHYSICIAN ASSISTANT

## 2022-06-24 NOTE — PROGRESS NOTES
Patient presented to clinic for a COVID retest, we also did a FLU test today. Swabs were obtained and tested. FLU A&B: negative  COVID: negative. Patient notified of results and verbalized understanding.

## 2022-07-05 ENCOUNTER — RESEARCH ENCOUNTER (OUTPATIENT)
Dept: RESEARCH | Facility: HOSPITAL | Age: 22
End: 2022-07-05
Payer: COMMERCIAL

## 2022-07-05 NOTE — PROGRESS NOTES
Trial: NIVIA, 2021.237  PI: Dr. White    This note is to indicate that the patient self-enrolled in the CHI-LEYDI study (Congenital Heart Initiative - Redefining Outcomes and Navigation to Adult Centered Care). This study is looking to improve care for future adult congenital heart defect patients. The trial consists of surveys periodically that the patient completes independently.     Please contact CIRILO Landa or Dr. Bhavin White for questions.   Trial Number: [id number]  Trial Email: [email or web address]

## 2022-07-26 ENCOUNTER — OFFICE VISIT (OUTPATIENT)
Dept: URGENT CARE | Facility: CLINIC | Age: 22
End: 2022-07-26
Payer: COMMERCIAL

## 2022-07-26 VITALS
TEMPERATURE: 98 F | SYSTOLIC BLOOD PRESSURE: 114 MMHG | DIASTOLIC BLOOD PRESSURE: 73 MMHG | OXYGEN SATURATION: 85 % | RESPIRATION RATE: 18 BRPM | HEIGHT: 67 IN | BODY MASS INDEX: 25.9 KG/M2 | WEIGHT: 165 LBS | HEART RATE: 86 BPM

## 2022-07-26 DIAGNOSIS — L25.9 CONTACT DERMATITIS, UNSPECIFIED CONTACT DERMATITIS TYPE, UNSPECIFIED TRIGGER: Primary | ICD-10-CM

## 2022-07-26 PROCEDURE — 99213 PR OFFICE/OUTPT VISIT, EST, LEVL III, 20-29 MIN: ICD-10-PCS | Mod: ,,, | Performed by: FAMILY MEDICINE

## 2022-07-26 PROCEDURE — 99213 OFFICE O/P EST LOW 20 MIN: CPT | Mod: ,,, | Performed by: FAMILY MEDICINE

## 2022-07-26 RX ORDER — PREDNISONE 20 MG/1
TABLET ORAL
Qty: 15 TABLET | Refills: 0 | Status: SHIPPED | OUTPATIENT
Start: 2022-07-26 | End: 2022-12-12

## 2022-07-26 NOTE — PROGRESS NOTES
"Subjective:       Patient ID: Jil Lennon is a 21 y.o. female.    Vitals:  height is 5' 7" (1.702 m) and weight is 74.8 kg (165 lb). Her temperature is 97.9 °F (36.6 °C). Her blood pressure is 114/73 and her pulse is 86. Her respiration is 18 and oxygen saturation is 85% (abnormal).     Chief Complaint: [facility]    A [facility] clinic complaining of an itchy red bumpy rash on the right arm.  States she was outdoors on Sunday and that evening she began having the itchy red rash developed.  Denies any shortness of breath beyond her baseline lip swelling tongue swelling or wheezing.  Patient does have a history of hypoplastic left heart syndrome which explains her low oxygen which is normal for her.        Skin: Positive for rash.       Objective:      Physical Exam   Constitutional: She is oriented to person, place, and time.   Pulmonary/Chest: Effort normal.   Abdominal: Normal appearance.   Neurological: She is alert and oriented to person, place, and time.   Skin: Skin is rash (Erythemic papular rash on the right arm).   Psychiatric: Her behavior is normal. Mood, judgment and thought content normal.   Vitals reviewed.          Previous History      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     to [place]    Dyspnea     Encounter for blood transfusion     Fracture of wrist     x4    General anesthetics causing adverse effect in therapeutic use     was mean as a child when waking up after tonsillectomy    Hypoplastic left heart     Liver disease     enlarged liver    Obstructive sleep apnea     resolved by T&A    Obstructive sleep apnea (adult) (pediatric)     Post-Fontan protein-losing enteropathy     Stroke     mother states possibly had stroke at 4 y/o    Tonsillar and " adenoid hypertrophy      Current Outpatient Medications   Medication Instructions    aspirin 81 mg, Oral, Nightly    digoxin (LANOXIN) 125 mcg tablet TAKE 1 TABLET BY MOUTH EVERY DAY IN THE EVENING    enalapril (VASOTEC) 2.5 MG tablet TAKE 1 TABLET BY MOUTH TWICE A DAY    furosemide (LASIX) 20 MG tablet TAKE 1 TABLET (20 MG TOTAL) BY MOUTH 2 (TWO) TIMES A DAY.    predniSONE (DELTASONE) 20 MG tablet Take 2 tabs p.o. q.d. x5 days then 1 tab p.o. q.d. x5 days    sertraline (ZOLOFT) 50 mg, Oral, Daily    sildenafil (REVATIO) 20 mg, Oral, 2 times daily, (pt taking twice a day)    spironolactone (ALDACTONE) 25 MG tablet TAKE 1 TABLET BY MOUTH TWICE A DAY    VRAYLAR 1.5 mg Cap 1 capsule, Oral, Daily     Past Surgical History:   Procedure Laterality Date    BIDIRECTIONAL [facility]SE [facility]/ [facility]    CARDIAC SURGERY      [facility]ATION  [facility]/11/2005     [facility]    COARCTATION STENT  3/9/11    COLONOSCOPY N/A 5/27/2021    Procedure: COLONOSCOPY;  Surgeon: Benigno Bowers MD;  Location: [facility] ENDO (2ND FLR);  Service: Endoscopy;  Laterality: N/A;    COMBINED RIGHT AND RETROGRADE LEFT HEART CATHETERIZATION FOR CONGENITAL HEART DEFECT N/A 3/7/2019    Procedure: CATHETERIZATION, HEART, COMBINED RIGHT AND RETROGRADE LEFT, FOR CONGENITAL HEART DEFECT;  Surgeon: Jimi Pollard Jr., MD;  Location: [facility] CATH LAB;  Service: Cardiology;  Laterality: N/A;  ped heart    ESOPHAGOGASTRODUODENOSCOPY N/A 5/27/2021    Procedure: EGD (ESOPHAGOGASTRODUODENOSCOPY);  Surgeon: Benigno Bowers MD;  Location: [facility] ENDO (2ND FLR);  Service: Endoscopy;  Laterality: N/A;  Patient will need pediatric heart anesthesiologist due to history of Fontan     mother states having formed stool         COVID test at [facility] on 5/24-GT    FONTAN PROCEDURE, EXTRACARDIAC  9/27/2004    [facility]    [facility]    LPA STENT  2/26/.2009    [facility]    narwood/ mitzi  age 3 days     done at [facility]     "TONSILLECTOMY, ADENOIDECTOMY  11/2011    WISDOM TOOTH EXTRACTION       Family History   Problem Relation Age of Onset    No Known Problems Father     Urologic Abnormality Other     Thyroid disease Mother     No Known Problems Maternal Grandmother     Hypertension Maternal Grandfather     Skin cancer Maternal Grandfather     Hypertension Paternal Grandmother     Glaucoma Paternal Grandmother     No Known Problems Sister     No Known Problems Brother     No Known Problems Maternal Aunt     No Known Problems Maternal Uncle     No Known Problems Paternal Aunt     No Known Problems Paternal Uncle     No Known Problems Paternal Grandfather     Cancer Other 80        colon    Heart disease Neg Hx     Diabetes Neg Hx     Retinal detachment Neg Hx     Amblyopia Neg Hx     Blindness Neg Hx     Strabismus Neg Hx     Macular degeneration Neg Hx     Stroke Neg Hx     Breast cancer Neg Hx     Ovarian cancer Neg Hx     Esophageal cancer Neg Hx        Social History     Tobacco Use    Smoking status: Current Every Day Smoker     Types: Vaping with nicotine    Smokeless tobacco: Never Used    Tobacco comment: No TAMMY   Substance Use Topics    Alcohol use: Yes     Comment: occassional    Drug use: No        Physical Exam      Vital Signs Reviewed   /73   Pulse 86   Temp 97.9 °F (36.6 °C)   Resp 18   Ht 5' 7" (1.702 m)   Wt 74.8 kg (165 lb)   SpO2 (!) 85%   BMI 25.84 kg/m²        Procedures    Procedures     Labs     Results for orders placed or performed in visit on 06/24/22   POCT COVID-19 Rapid Screening   Result Value Ref Range    POC Rapid COVID Negative Negative     Acceptable Yes    POCT Influenza A/B   Result Value Ref Range    Rapid Influenza A Ag Negative Negative    Rapid Influenza B Ag Negative Negative     Acceptable Yes          Assessment:       1. Contact dermatitis, unspecified contact dermatitis type, unspecified trigger          Plan:     "   Steroids sent to pharmacy.  You can take Claritin or Zyrtec in the morning, Benadryl at bedtime as well.  Calamine lotion daily.  If symptoms persist or worsen return to clinic or seek medical attention immediately  Contact dermatitis, unspecified contact dermatitis type, unspecified trigger    Other orders  -     predniSONE (DELTASONE) 20 MG tablet; Take 2 tabs p.o. q.d. x5 days then 1 tab p.o. q.d. x5 days  Dispense: 15 tablet; Refill: 0

## 2022-07-26 NOTE — PATIENT INSTRUCTIONS
Steroids sent to pharmacy.  You can take Claritin or Zyrtec in the morning, Benadryl at bedtime as well.  Calamine lotion daily.  If symptoms persist or worsen return to clinic or seek medical attention immediately

## 2022-08-02 ENCOUNTER — PATIENT MESSAGE (OUTPATIENT)
Dept: OBSTETRICS AND GYNECOLOGY | Facility: CLINIC | Age: 22
End: 2022-08-02
Payer: COMMERCIAL

## 2022-08-02 ENCOUNTER — PATIENT MESSAGE (OUTPATIENT)
Dept: OPTOMETRY | Facility: CLINIC | Age: 22
End: 2022-08-02
Payer: COMMERCIAL

## 2022-09-21 ENCOUNTER — HISTORICAL (OUTPATIENT)
Dept: ADMINISTRATIVE | Facility: HOSPITAL | Age: 22
End: 2022-09-21
Payer: COMMERCIAL

## 2022-09-26 ENCOUNTER — PATIENT MESSAGE (OUTPATIENT)
Dept: PEDIATRIC CARDIOLOGY | Facility: CLINIC | Age: 22
End: 2022-09-26
Payer: COMMERCIAL

## 2022-09-26 ENCOUNTER — OFFICE VISIT (OUTPATIENT)
Dept: URGENT CARE | Facility: CLINIC | Age: 22
End: 2022-09-26
Payer: COMMERCIAL

## 2022-09-26 VITALS
SYSTOLIC BLOOD PRESSURE: 111 MMHG | HEART RATE: 89 BPM | BODY MASS INDEX: 26.68 KG/M2 | RESPIRATION RATE: 18 BRPM | HEIGHT: 67 IN | WEIGHT: 170 LBS | DIASTOLIC BLOOD PRESSURE: 76 MMHG | OXYGEN SATURATION: 88 % | TEMPERATURE: 98 F

## 2022-09-26 DIAGNOSIS — R19.7 DIARRHEA, UNSPECIFIED TYPE: Primary | ICD-10-CM

## 2022-09-26 DIAGNOSIS — Q23.4 HLHS (HYPOPLASTIC LEFT HEART SYNDROME): Primary | ICD-10-CM

## 2022-09-26 LAB
CTP QC/QA: YES
SARS-COV-2 RDRP RESP QL NAA+PROBE: NEGATIVE

## 2022-09-26 PROCEDURE — U0002 COVID-19 LAB TEST NON-CDC: HCPCS | Mod: QW,,, | Performed by: PHYSICIAN ASSISTANT

## 2022-09-26 PROCEDURE — 99213 PR OFFICE/OUTPT VISIT, EST, LEVL III, 20-29 MIN: ICD-10-PCS | Mod: ,,, | Performed by: PHYSICIAN ASSISTANT

## 2022-09-26 PROCEDURE — U0002: ICD-10-PCS | Mod: QW,,, | Performed by: PHYSICIAN ASSISTANT

## 2022-09-26 PROCEDURE — 99213 OFFICE O/P EST LOW 20 MIN: CPT | Mod: ,,, | Performed by: PHYSICIAN ASSISTANT

## 2022-09-26 NOTE — PROGRESS NOTES
"Subjective:       Patient ID: Jil Lennon is a 22 y.o. female.    Vitals:  height is 5' 7" (1.702 m) and weight is 77.1 kg (170 lb). Her oral temperature is 98.3 °F (36.8 °C). Her blood pressure is 111/76 and her pulse is 89. Her respiration is 18 and oxygen saturation is 88% (abnormal).     Chief Complaint: Headache    HA, diarrhea started this am. Exposure to COVID.  Denies any runny nose sore throat cough neck stiffness rash or acute shortness of breath.  Oxygen saturation noted on check-in.  Patient states that this is her normal which is due to her cardiac history.  ROS    Objective:      Physical Exam   Constitutional: She is oriented to person, place, and time. She appears well-developed.   HENT:   Head: Normocephalic and atraumatic.   Ears:   Right Ear: External ear normal.   Left Ear: External ear normal.   Nose: Nose normal.   Mouth/Throat: Mucous membranes are normal.   Eyes: Conjunctivae and lids are normal.   Neck: Trachea normal. Neck supple.   Cardiovascular: Normal rate and regular rhythm.   Murmur heard.  Pulmonary/Chest: Effort normal and breath sounds normal. No respiratory distress.   Abdominal: Normal appearance and bowel sounds are normal. She exhibits no distension and no mass. Soft. There is no abdominal tenderness. There is no guarding.   Musculoskeletal: Normal range of motion.         General: Normal range of motion.   Neurological: She is alert and oriented to person, place, and time. She has normal strength.   Skin: Skin is warm, dry, intact, not diaphoretic and not pale.   Psychiatric: Her speech is normal and behavior is normal. Judgment and thought content normal.   Nursing note and vitals reviewed.             Previous History      Review of patient's allergies indicates:   Allergen Reactions    Adhesive Dermatitis       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    Chylothorax     Congenital " absence of pulmonary artery     to [place]    Dyspnea     Encounter for blood transfusion     Fracture of wrist     x4    General anesthetics causing adverse effect in therapeutic use     was mean as a child when waking up after tonsillectomy    Hypoplastic left heart     Liver disease     enlarged liver    Obstructive sleep apnea     resolved by T&A    Obstructive sleep apnea (adult) (pediatric)     Post-Fontan protein-losing enteropathy     Stroke     mother states possibly had stroke at 4 y/o    Tonsillar and adenoid hypertrophy      Current Outpatient Medications   Medication Instructions    aspirin 81 mg, Oral, Nightly    digoxin (LANOXIN) 125 mcg tablet TAKE 1 TABLET BY MOUTH EVERY DAY IN THE EVENING    enalapril (VASOTEC) 2.5 MG tablet TAKE 1 TABLET BY MOUTH TWICE A DAY    furosemide (LASIX) 20 MG tablet TAKE 1 TABLET (20 MG TOTAL) BY MOUTH 2 (TWO) TIMES A DAY.    predniSONE (DELTASONE) 20 MG tablet Take 2 tabs p.o. q.d. x5 days then 1 tab p.o. q.d. x5 days    sertraline (ZOLOFT) 50 mg, Oral, Daily    sildenafil (REVATIO) 20 mg, Oral, 2 times daily, (pt taking twice a day)    spironolactone (ALDACTONE) 25 MG tablet TAKE 1 TABLET BY MOUTH TWICE A DAY    VRAYLAR 1.5 mg Cap 1 capsule, Oral, Daily     Past Surgical History:   Procedure Laterality Date    BIDIRECTIONAL JOSE W/ ATRIAL SEPTECTOMY      [facility]    CARDIAC SURGERY      CATHETER REFENESTRATION  1/11/2005     [facility]    COARCTATION STENT  3/9/11    COLONOSCOPY N/A 5/27/2021    Procedure: COLONOSCOPY;  Surgeon: Benigno Bowers MD;  [facility]  Service: Endoscopy;  Laterality: N/A;    COMBINED RIGHT AND RETROGRADE LEFT HEART CATHETERIZATION FOR CONGENITAL HEART DEFECT N/A 3/7/2019    Procedure: CATHETERIZATION, HEART, COMBINED RIGHT AND RETROGRADE LEFT, FOR CONGENITAL HEART DEFECT;  Surgeon: Jimi Pollard Jr., MD;  Location: [facility] CATH LAB;  Service: Cardiology;  Laterality: N/A;  ped heart    ESOPHAGOGASTRODUODENOSCOPY N/A 5/27/2021  "   Procedure: EGD (ESOPHAGOGASTRODUODENOSCOPY);  Surgeon: Benigno Bowers MD;  Location: [facility] ([address] [place] [address]);  Service: Endoscopy;  Laterality: N/A;  Patient will need pediatric heart anesthesiologist due to history of Fontan     mother states having formed stool         C[facility] test at [facility] on 5/24-GT    FONTAN PROCEDURE, EXTRACARDIAC  9/27/2004    [facility]    [facility]    [facility] STENT  2/26/.2009    [facility]    narwood/ mitzi  age 3 days     done at [facility]    TONSILLECTOMY, ADENOIDECTOMY  11/2011    WISDOM TOOTH EXTRACTION       Family History   Problem Relation Age of Onset    No Known Problems Father     Urologic Abnormality Other     Thyroid disease Mother     No Known Problems Maternal Grandmother     Hypertension Maternal Grandfather     Skin cancer Maternal Grandfather     Hypertension Paternal Grandmother     Glaucoma Paternal Grandmother     No Known Problems Sister     No Known Problems Brother     No Known Problems Maternal Aunt     No Known Problems Maternal Uncle     No Known Problems Paternal Aunt     No Known Problems Paternal Uncle     No Known Problems Paternal Grandfather     Cancer Other 80        colon    Heart disease Neg Hx     Diabetes Neg Hx     Retinal detachment Neg Hx     Amblyopia Neg Hx     Blindness Neg Hx     Strabismus Neg Hx     Macular degeneration Neg Hx     Stroke Neg Hx     Breast cancer Neg Hx     Ovarian cancer Neg Hx     Esophageal cancer Neg Hx        Social History     Tobacco Use    Smoking status: Every Day     Types: Vaping with nicotine    Smokeless tobacco: Never    Tobacco comments:     No TAMMY   Substance Use Topics    Alcohol use: Yes     Comment: occassional    Drug use: No        Physical Exam      Vital Signs Reviewed   /76   Pulse 89   Temp 98.3 °F (36.8 °C) (Oral)   Resp 18   Ht 5' 7" (1.702 m)   Wt 77.1 kg (170 lb)   LMP 09/19/2022   SpO2 (!) 88%   BMI 26.63 kg/m²        Procedures    Procedures     Labs     Results " for orders placed or performed in visit on 09/21/22   POCT rapid strep A   Result Value Ref Range    Rapid Group A Strep Negative    POCT Influenza A/B Molecular   Result Value Ref Range    Inflenza A Ag Negative     Influenza B Ag Negative    POCT COVID-19 Rapid Screening   Result Value Ref Range    SARS-CoV-2 PCR Negative >Negative     Assessment:       1. Diarrhea, unspecified type          Plan:         Diarrhea, unspecified type  -     POCT COVID-19 Rapid Screening       Increase fluid intake and monitor for signs of dehydration including dark colored urine, weakness, lethargy, dizziness, etc.   Get plenty of rest.   BRAT Diet: Begin eating a BRAT diet as tolerated (bananas, plain rice, apple sauce, plain toast).  Fever / Body Aches: Take OTC Tylenol or Motrin per package instructions as needed.     Follow-up with your Primary Care Provider as needed.   Present to the nearest Emergency Department with any significant change or worsening symptoms.

## 2022-09-26 NOTE — PATIENT INSTRUCTIONS
Increase fluid intake and monitor for signs of dehydration including dark colored urine, weakness, lethargy, dizziness, etc.   Get plenty of rest.   BRAT Diet: Begin eating a BRAT diet as tolerated (bananas, plain rice, apple sauce, plain toast).  Fever / Body Aches: Take OTC Tylenol or Motrin per package instructions as needed.     Follow-up with your Primary Care Provider as needed.   Present to the nearest Emergency Department with any significant change or worsening symptoms.

## 2022-11-08 ENCOUNTER — PATIENT MESSAGE (OUTPATIENT)
Dept: OPTOMETRY | Facility: CLINIC | Age: 22
End: 2022-11-08
Payer: COMMERCIAL

## 2022-11-11 ENCOUNTER — PATIENT MESSAGE (OUTPATIENT)
Dept: PEDIATRIC GASTROENTEROLOGY | Facility: CLINIC | Age: 22
End: 2022-11-11
Payer: COMMERCIAL

## 2022-11-12 DIAGNOSIS — Z98.890 S/P FONTAN PROCEDURE: Primary | ICD-10-CM

## 2022-11-12 NOTE — PROGRESS NOTES
Aruora Cantu, patient would like the thyroid prelabs ordered today to be mailed to her home. Appreciated ! Nathan Damon.  [address] [facility]  [address] [place] [address] Subjective:      Lluvia Kebede is a 18 y.o. female here with mother. Patient brought in for Otalgia      History of Present Illness:  HPI   Pt with hx of HLHL Sx here for R ear pain. Congestion for about 1 month. Got a cath last week and dealt with headaches during recovery. Tx with excedrin for headache.      Review of Systems   Constitutional: Negative for activity change, appetite change, diaphoresis and fever.   HENT: Positive for congestion, ear pain and rhinorrhea. Negative for sore throat.    Respiratory: Negative for cough and shortness of breath.    Gastrointestinal: Negative for diarrhea and vomiting.   Genitourinary: Negative for decreased urine volume.   Skin: Negative for rash.   Neurological: Positive for headaches.       Objective:     Physical Exam   Constitutional: She appears well-nourished. No distress.   HENT:   Head: Normocephalic.   Right Ear: Ear canal normal. A middle ear effusion (pus, bulging, dull) is present.   Left Ear: Ear canal normal. A middle ear effusion (clear) is present.   Nose: Mucosal edema present.   Mouth/Throat: Oropharynx is clear and moist.   Eyes: Conjunctivae and EOM are normal. Pupils are equal, round, and reactive to light. Right eye exhibits no discharge. Left eye exhibits no discharge.   Neck: Neck supple.   Cardiovascular: Normal rate, regular rhythm, normal heart sounds and normal pulses.   No murmur heard.  Pulmonary/Chest: Effort normal and breath sounds normal. No respiratory distress.   Abdominal: Soft. Bowel sounds are normal. She exhibits no distension. There is no hepatosplenomegaly. There is no tenderness.   Lymphadenopathy:     She has no cervical adenopathy.   Neurological: She is alert.   Skin: Skin is warm. No rash noted.   Nursing note and vitals reviewed.      Assessment:        1. Acute suppurative otitis media of right ear without spontaneous rupture of tympanic membrane, recurrence not specified    2. Sinusitis, unspecified chronicity, unspecified  This has been put in the mail today.   [facility] location    3. Personal history of surgery to heart and great vessels, presenting hazards to health    4. Nasal congestion         Plan:       augmentin  RTC or call our clinic as needed for new concerns, new problems or worsening of symptoms.  Caregiver agreeable to plan.         Never smoker

## 2022-11-28 ENCOUNTER — PATIENT MESSAGE (OUTPATIENT)
Dept: OPTOMETRY | Facility: CLINIC | Age: 22
End: 2022-11-28
Payer: COMMERCIAL

## 2022-11-28 ENCOUNTER — OFFICE VISIT (OUTPATIENT)
Dept: URGENT CARE | Facility: CLINIC | Age: 22
End: 2022-11-28
Payer: COMMERCIAL

## 2022-11-28 ENCOUNTER — PATIENT MESSAGE (OUTPATIENT)
Dept: PEDIATRIC GASTROENTEROLOGY | Facility: CLINIC | Age: 22
End: 2022-11-28
Payer: COMMERCIAL

## 2022-11-28 VITALS
SYSTOLIC BLOOD PRESSURE: 120 MMHG | BODY MASS INDEX: 26.68 KG/M2 | TEMPERATURE: 98 F | RESPIRATION RATE: 18 BRPM | HEART RATE: 105 BPM | OXYGEN SATURATION: 83 % | DIASTOLIC BLOOD PRESSURE: 81 MMHG | WEIGHT: 170 LBS | HEIGHT: 67 IN

## 2022-11-28 DIAGNOSIS — M25.532 LEFT WRIST PAIN: Primary | ICD-10-CM

## 2022-11-28 PROCEDURE — 99202 OFFICE O/P NEW SF 15 MIN: CPT | Mod: ,,, | Performed by: NURSE PRACTITIONER

## 2022-11-28 PROCEDURE — 99202 PR OFFICE/OUTPT VISIT, NEW, LEVL II, 15-29 MIN: ICD-10-PCS | Mod: ,,, | Performed by: NURSE PRACTITIONER

## 2022-11-28 NOTE — PROGRESS NOTES
"Subjective:       Patient ID: Jil Lennon is a 22 y.o. female.    Vitals:  height is 5' 7" (1.702 m) and weight is 77.1 kg (170 lb). Her temperature is 98.3 °F (36.8 °C). Her blood pressure is 120/81 and her pulse is 105. Her respiration is 18 and oxygen saturation is 83% (abnormal).     Chief Complaint: No chief complaint on file.    22-year-old female presents with left wrist pain.  States accidentally slipped in her bathtub 4 days ago.    Musculoskeletal:  Positive for pain (left wrist).     Objective:      Physical Exam   Cardiovascular: Normal rate, regular rhythm, normal heart sounds and normal pulses.   Pulmonary/Chest: Effort normal and breath sounds normal.   Abdominal: Normal appearance.   Musculoskeletal:      Left wrist: She exhibits tenderness and swelling.        Arms:    Neurological: She is alert.   Nursing note and vitals reviewed.    O2 saturation 83% on room air.  Patient states O2 saturations usually between 80 and 85%, due to AVM, and absence of pulmonary artery.  No shortness of breath, cough, or fever      Assessment:       1. Left wrist pain          Plan:     Modify activity and gentle stretching  Wear wrist splint as instructed  Tylenol or ibuprofen OTC as directed for pain  Follow-up with your primary care provider if symptoms worsen or persist as instructed  We will notify you of the final radiology x-ray report result    Left wrist pain  -     XR WRIST COMPLETE 3 VIEWS LEFT; Future; Expected date: 11/28/2022  -     WRIST BRACE FOR HOME USE  -     Ambulatory referral/consult to Orthopedics                   "

## 2022-12-12 ENCOUNTER — OFFICE VISIT (OUTPATIENT)
Dept: PEDIATRIC CARDIOLOGY | Facility: CLINIC | Age: 22
End: 2022-12-12
Attending: PEDIATRICS
Payer: COMMERCIAL

## 2022-12-12 ENCOUNTER — OFFICE VISIT (OUTPATIENT)
Dept: OPTOMETRY | Facility: CLINIC | Age: 22
End: 2022-12-12
Payer: COMMERCIAL

## 2022-12-12 ENCOUNTER — OFFICE VISIT (OUTPATIENT)
Dept: PEDIATRIC GASTROENTEROLOGY | Facility: CLINIC | Age: 22
End: 2022-12-12
Payer: COMMERCIAL

## 2022-12-12 ENCOUNTER — HOSPITAL ENCOUNTER (OUTPATIENT)
Dept: PEDIATRIC CARDIOLOGY | Facility: HOSPITAL | Age: 22
Discharge: HOME | End: 2022-12-12
Attending: PEDIATRICS
Payer: COMMERCIAL

## 2022-12-12 ENCOUNTER — HOSPITAL ENCOUNTER (OUTPATIENT)
Dept: RADIOLOGY | Facility: HOSPITAL | Age: 22
Discharge: HOME | End: 2022-12-12
Attending: PEDIATRICS
Payer: COMMERCIAL

## 2022-12-12 VITALS
HEART RATE: 80 BPM | SYSTOLIC BLOOD PRESSURE: 119 MMHG | HEIGHT: 67 IN | DIASTOLIC BLOOD PRESSURE: 67 MMHG | BODY MASS INDEX: 30.2 KG/M2 | WEIGHT: 192.44 LBS | OXYGEN SATURATION: 84 % | TEMPERATURE: 98 F

## 2022-12-12 VITALS
DIASTOLIC BLOOD PRESSURE: 74 MMHG | HEART RATE: 88 BPM | HEIGHT: 68 IN | WEIGHT: 191.56 LBS | BODY MASS INDEX: 29.03 KG/M2 | SYSTOLIC BLOOD PRESSURE: 124 MMHG | OXYGEN SATURATION: 86 %

## 2022-12-12 DIAGNOSIS — Q23.4 HLHS (HYPOPLASTIC LEFT HEART SYNDROME): ICD-10-CM

## 2022-12-12 DIAGNOSIS — Z98.890 S/P FONTAN PROCEDURE: ICD-10-CM

## 2022-12-12 DIAGNOSIS — H47.333 CROWDED OPTIC DISC, BILATERAL: Primary | ICD-10-CM

## 2022-12-12 DIAGNOSIS — K76.1 CHRONIC PASSIVE CONGESTION OF LIVER: Primary | ICD-10-CM

## 2022-12-12 DIAGNOSIS — H52.222 REGULAR ASTIGMATISM OF LEFT EYE: ICD-10-CM

## 2022-12-12 DIAGNOSIS — Q23.4 HLHS (HYPOPLASTIC LEFT HEART SYNDROME): Primary | ICD-10-CM

## 2022-12-12 DIAGNOSIS — Q25.6 PULMONARY ARTERY STENOSIS OF CENTRAL BRANCH: ICD-10-CM

## 2022-12-12 DIAGNOSIS — Q25.1 AORTA COARCTATION: ICD-10-CM

## 2022-12-12 DIAGNOSIS — R23.0 CYANOSIS: ICD-10-CM

## 2022-12-12 DIAGNOSIS — Z98.890 PERSONAL HISTORY OF SURGERY TO HEART AND GREAT VESSELS, PRESENTING HAZARDS TO HEALTH: ICD-10-CM

## 2022-12-12 DIAGNOSIS — H52.13 MYOPIA OF BOTH EYES: ICD-10-CM

## 2022-12-12 PROBLEM — R10.9 ABDOMINAL PAIN: Status: RESOLVED | Noted: 2020-03-02 | Resolved: 2022-12-12

## 2022-12-12 PROBLEM — R19.7 DIARRHEA: Status: RESOLVED | Noted: 2022-09-26 | Resolved: 2022-12-12

## 2022-12-12 PROBLEM — R10.9 ABDOMINAL PAIN IN FEMALE PATIENT: Status: RESOLVED | Noted: 2020-03-02 | Resolved: 2022-12-12

## 2022-12-12 PROBLEM — M79.641 RIGHT HAND PAIN: Status: RESOLVED | Noted: 2022-06-06 | Resolved: 2022-12-12

## 2022-12-12 LAB — BSA FOR ECHO PROCEDURE: 2.05 M2

## 2022-12-12 PROCEDURE — 93321 DOPPLER ECHO F-UP/LMTD STD: CPT

## 2022-12-12 PROCEDURE — 99214 PR OFFICE/OUTPT VISIT, EST, LEVL IV, 30-39 MIN: ICD-10-PCS | Mod: S$GLB,,, | Performed by: PEDIATRICS

## 2022-12-12 PROCEDURE — 93304 ECHO TRANSTHORACIC: CPT | Mod: 26,,, | Performed by: PEDIATRICS

## 2022-12-12 PROCEDURE — 91200 LIVER ELASTOGRAPHY: CPT | Mod: 26,,, | Performed by: INTERNAL MEDICINE

## 2022-12-12 PROCEDURE — 76705 ECHO EXAM OF ABDOMEN: CPT | Mod: 26,59,, | Performed by: INTERNAL MEDICINE

## 2022-12-12 PROCEDURE — 93321 PEDIATRIC ECHO (CUPID ONLY): ICD-10-PCS | Mod: 26,,, | Performed by: PEDIATRICS

## 2022-12-12 PROCEDURE — 93242 EXT ECG>48HR<7D RECORDING: CPT

## 2022-12-12 PROCEDURE — 99999 PR PBB SHADOW E&M-EST. PATIENT-LVL III: CPT | Mod: PBBFAC,,, | Performed by: OPTOMETRIST

## 2022-12-12 PROCEDURE — 93325 DOPPLER ECHO COLOR FLOW MAPG: CPT | Mod: 26,,, | Performed by: PEDIATRICS

## 2022-12-12 PROCEDURE — 93244 EXT ECG>48HR<7D REV&INTERPJ: CPT | Mod: ,,, | Performed by: PEDIATRICS

## 2022-12-12 PROCEDURE — 76705 ECHO EXAM OF ABDOMEN: CPT | Mod: TC,59

## 2022-12-12 PROCEDURE — 76705 US ABDOMEN LIMITED: ICD-10-PCS | Mod: 26,59,, | Performed by: INTERNAL MEDICINE

## 2022-12-12 PROCEDURE — 99215 PR OFFICE/OUTPT VISIT, EST, LEVL V, 40-54 MIN: ICD-10-PCS | Mod: 25,S$GLB,, | Performed by: PEDIATRICS

## 2022-12-12 PROCEDURE — 92014 PR EYE EXAM, EST PATIENT,COMPREHESV: ICD-10-PCS | Mod: S$GLB,,, | Performed by: OPTOMETRIST

## 2022-12-12 PROCEDURE — 91200 LIVER ELASTOGRAPHY: CPT | Mod: TC

## 2022-12-12 PROCEDURE — 93325 DOPPLER ECHO COLOR FLOW MAPG: CPT

## 2022-12-12 PROCEDURE — 93244 CV 3-14 DAY PEDIATRIC HOLTER MONITOR (CUPID ONLY): ICD-10-PCS | Mod: ,,, | Performed by: PEDIATRICS

## 2022-12-12 PROCEDURE — 93321 DOPPLER ECHO F-UP/LMTD STD: CPT | Mod: 26,,, | Performed by: PEDIATRICS

## 2022-12-12 PROCEDURE — 93304 PEDIATRIC ECHO (CUPID ONLY): ICD-10-PCS | Mod: 26,,, | Performed by: PEDIATRICS

## 2022-12-12 PROCEDURE — 99215 OFFICE O/P EST HI 40 MIN: CPT | Mod: 25,S$GLB,, | Performed by: PEDIATRICS

## 2022-12-12 PROCEDURE — 93325 PEDIATRIC ECHO (CUPID ONLY): ICD-10-PCS | Mod: 26,,, | Performed by: PEDIATRICS

## 2022-12-12 PROCEDURE — 99214 OFFICE O/P EST MOD 30 MIN: CPT | Mod: S$GLB,,, | Performed by: PEDIATRICS

## 2022-12-12 PROCEDURE — 93000 ELECTROCARDIOGRAM COMPLETE: CPT | Mod: S$GLB,,, | Performed by: PEDIATRICS

## 2022-12-12 PROCEDURE — 92014 COMPRE OPH EXAM EST PT 1/>: CPT | Mod: S$GLB,,, | Performed by: OPTOMETRIST

## 2022-12-12 PROCEDURE — 93000 EKG 12-LEAD PEDIATRIC: ICD-10-PCS | Mod: S$GLB,,, | Performed by: PEDIATRICS

## 2022-12-12 PROCEDURE — 99999 PR PBB SHADOW E&M-EST. PATIENT-LVL IV: ICD-10-PCS | Mod: PBBFAC,,, | Performed by: PEDIATRICS

## 2022-12-12 PROCEDURE — 91200 US ELASTOGRAPHY LIVER: ICD-10-PCS | Mod: 26,,, | Performed by: INTERNAL MEDICINE

## 2022-12-12 PROCEDURE — 99999 PR PBB SHADOW E&M-EST. PATIENT-LVL III: ICD-10-PCS | Mod: PBBFAC,,, | Performed by: OPTOMETRIST

## 2022-12-12 PROCEDURE — 99999 PR PBB SHADOW E&M-EST. PATIENT-LVL IV: CPT | Mod: PBBFAC,,, | Performed by: PEDIATRICS

## 2022-12-12 PROCEDURE — 99999 PR PBB SHADOW E&M-EST. PATIENT-LVL III: ICD-10-PCS | Mod: PBBFAC,,, | Performed by: PEDIATRICS

## 2022-12-12 PROCEDURE — 99999 PR PBB SHADOW E&M-EST. PATIENT-LVL III: CPT | Mod: PBBFAC,,, | Performed by: PEDIATRICS

## 2022-12-12 RX ORDER — TRETINOIN 0.25 MG/G
GEL TOPICAL NIGHTLY
COMMUNITY
Start: 2022-10-14

## 2022-12-12 NOTE — PROGRESS NOTES
"HPI    Jil Lennon is a 22 y.o. female who returns, for continued eye care.   She  has congenital heart disease which is treated with sildenafil. She   also has small, crowded optic nerves ("disc at risk") which increase her   risk of retinal vascular occlusion on sildenafil. She has experienced blue   entopic phenomena in the past. Today, she reports that this still happens,   however, it occurs much less frequently.    (--)blurred vision  (--)Headaches  (--)diplopia  (--)flashes  (--)floaters  (--)pain  (--)Itching  (--)tearing  (--)burning  (--)Dryness  (--) OTC Drops  (--)Photophobia     Last edited by Petar Mcelroy, OD on 12/12/2022  2:13 PM.        Review of Systems   Constitutional:  Negative for chills, fever and malaise/fatigue.   HENT:  Negative for congestion, hearing loss and sore throat.    Eyes:  Negative for blurred vision, double vision, photophobia, pain, discharge and redness.   Respiratory: Negative.  Negative for cough, shortness of breath and wheezing.    Cardiovascular: Negative.    Gastrointestinal: Negative.  Negative for nausea and vomiting.   Genitourinary: Negative.    Musculoskeletal: Negative.    Skin: Negative.    Neurological:  Negative for seizures.   Psychiatric/Behavioral: Negative.       For exam results, see encounter report    Assessment /Plan     1. 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 High, Bilateral Myopic Astigmatism   - Same specs/ CLRx  Glasses Prescription (12/12/2022)          Sphere Cylinder Axis    Right -6.75 Sphere     Left -6.75 +0.75 105      Type: SVL    Expiration Date: 12/31/2023          Contact Lens Prescription (12/12/2022)          " Brand Base Curve Diameter Sphere Cylinder Axis    Right Precision1 8.3 14.2 -7.00 Sphere     Left Precision1 for Astigmatism 8.5 14.5 -6.00 -0.75 020      Expiration Date: 12/31/2023    Replacement: Daily    Solutions: OptiFree PureMoist    Wearing Schedule: Daily Wear            Patient education; RTC in 6 months for full exam with  DFE;  ok to instill 1% Tropicamide OU after baseline work-up

## 2022-12-12 NOTE — LETTER
December 12, 2022        Jimi Pollard Jr., MD  [address]  [place] [address]             [facility]  [address]  [place] [address]  Phone: [phone]   Patient: Jil Lennon   MR Number: [id number]   YOB: 2000   Date of Visit: 12/12/2022       Dear Dr. Pollard:    Thank you for referring Jil Lennon to me for evaluation. Below are the relevant portions of my assessment and plan of care.            If you have questions, please do not hesitate to call me. I look forward to following Jil along with you.    Sincerely,      Tl Ac MD           CC  Angie Guardado MD

## 2022-12-12 NOTE — LETTER
December 12, 2022        Angie Guardado MD  [address]  [facility] [address]             [facility]  [address]  [facility] [address]  Phone: [phone]  Fax: [phone]   Patient: Jil Lennon   MR Number: [id number]   YOB: 2000   Date of Visit: 12/12/2022       Dear Dr. Guardado:    Thank you for referring Jil Lennon to me for evaluation. Below are the relevant portions of my assessment and plan of care.    1. HLHS (hypoplastic left heart syndrome), s/p fenestrated Fontan   2. Aorta coarctation, relieved with stent    3. Pulmonary artery stenosis of central branch, relieved with stent    4. Diffuse pulmonary micro-AVMs (arteriovenous malformation)    5. 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 Post-Fontan protein-losing enteropathy, resolved    7. S/P Fontan procedure    8. S/P Fontan conduit stent   9. History of palpitations, quiescent   10. Anxiety/Depression        1. I reviewed today's findings in detail. We reviewed adult issues with CHD in detail including overall cardiac health, mortality issues, anticoagulation, contraception and pregnancy.  2. Same medications (digoxin, lasix, sildenafil, aldactone, aspirin).  3. SBE precautions prn.  4. Treat as normal from a cardiac standpoint, encouraged increased exercise.  5. Recheck in 6 months with ECG, ECHO and Holter, CBC, CMP, TFTs (T4/TSH).  6. Follow up with Dr. Ac today for liver evaluation, yearly thereafter.      If you have questions, please do not hesitate to call me. I look forward to following Jil along with you.    Sincerely,      Jimi Pollard Jr., MD           CC    No Recipients

## 2022-12-12 NOTE — PROGRESS NOTES
Subjective:      Patient ID: Jil Lennon is a 22 y.o. female.    Chief Complaint: Follow-up    Complications of Liver Disease  1. Ascites:  no  2. SBP:  no  3. Varices: unknown   4. Acute variceal hemorrhage:  no  5. Encephalopathy:  no  6. Hepatorenal syndrome:  no  7. Hepatopulmonary syndrome:  no  8. Growth failure:  no  9. Cholangitis:  no  10. Pathological fracture:  no  11. Pruritus:  no    Liver-related Investigations and Screening  1. Liver biopsy:  no  2. EGD:  no  3. Colonoscopy:  no  4. ERCP:  no  5. Paracentesis:  no  6. PTC:    no  7. US: 12/2022-no FLL, no HM, mild SM (12.7), 1.51 m/s, 6.96 kPa  4/2021-no FLL, +HSM, elastog 1.3 m/s, 5.4 kPa  10/1/2019-no ascites, no FLLs, spleen ULN size, baseline elastography 1.3 m/s (F0-1)  8. MR:  no  9. AFP: 1.7 (10/2019), 1.9 (4/2021), <2 (2/2022)    Liver-related Medications  none    Calculated PELD/MELD:  Computed MELD-Na score unavailable. Necessary lab results were not found in the last year.  Computed MELD score unavailable. Necessary lab results were not found in the last year.       [facility]  [facility]      22 yr old female with Fontan palliation of HLHS seen in follow-up for FALD.      Feeling well overall.  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  No unanticipated medical visits or hospitalizations.  Saw Dr. Pollard today and had a good check up.  No further episodes of abdominal pain.      Review of Systems   Constitutional:  Negative for activity change and unexpected weight change.   Respiratory:  Negative for cough.    Gastrointestinal:  Negative for abdominal distention and abdominal pain.   Skin:  Negative for pallor.   Allergic/Immunologic: Negative for immunocompromised state.     Objective:   Physical Exam  Vitals reviewed.   Constitutional:       General: She is not in acute distress.     Appearance: She is well-developed.   HENT:      Nose: No congestion.       Mouth/Throat:      Comments: Front gums appear normal  Eyes:      General: No scleral icterus.  Cardiovascular:      Rate and Rhythm: Normal rate.   Pulmonary:      Effort: Pulmonary effort is normal. No respiratory distress.   Abdominal:      General: There is no distension.      Palpations: Abdomen is soft.      Tenderness: There is no abdominal tenderness.   Skin:     General: Skin is warm.      Coloration: Skin is not jaundiced or pale.      Comments: anicteric   Neurological:      Mental Status: She is alert.   Psychiatric:         Mood and Affect: Mood normal.         Behavior: Behavior normal.         Thought Content: Thought content normal.       Labs/Imaging:     Component      Latest Ref Rng & Units 2/14/2022   Sodium      136 - 145 mmol/L 141   Potassium      3.5 - 5.1 mmol/L 3.5   Chloride      95 - 110 mmol/L 105   CO2      23 - 29 mmol/L 24   Glucose      70 - 110 mg/dL 76   BUN      6 - 20 mg/dL 10   Creatinine      0.5 - 1.4 mg/dL 0.8   Calcium      8.7 - 10.5 mg/dL 10.4   PROTEIN TOTAL      6.0 - 8.4 g/dL 8.1   Albumin      3.5 - 5.2 g/dL 4.8   BILIRUBIN TOTAL      0.1 - 1.0 mg/dL 1.6 (H)   Alkaline Phosphatase      55 - 135 U/L 119   AST      10 - 40 U/L 21   ALT      10 - 44 U/L 28   ANION GAP      8 - 16 mmol/L 12   eGFR if African American      >60 mL/min/1.73 m:2 >60.0   eGFR if non African American      >60 mL/min/1.73 m:2 >60.0   AFP      0.0 - 8.4 ng/mL <2.0       Assessment:     1. Chronic passive congestion of liver (FALD)    2. S/P Fontan procedure      Plan:   22 y.o. woman with Fontan physiology related to palliation of HLHS seen in follow-up for FALD.    Congestive Hepatopathy  #  Mild radiographic splenomegaly only  #  Liver stiffness measured by US elastography fairly constant since baseline scan in 2019    At-risk for HCC  #  No focal liver lesions on US today  #  AFP remains normal      # RTC ~1 year; repeat US exams in conjunction with visit

## 2022-12-12 NOTE — PROGRESS NOTES
"Subjective:    Patient ID:  Jil Lennon is a 22 y.o. female who presents for  scheduled follow-up with no acute complaints. She came today with mom. She is "feeling great". She has HLHS s/p staged palliation with late catheter based re-fenestration at 4 years of age for anasarca/PLE, coarctation stent, LPA stent and Fontan conduit stent (3/7/2019). She is doing very well overall now from a cardiac standpoint but has significant anxiety and eating disorder.     She has a history of abdominal complaints with small bowel bacterial overgrowth syndrome treated with antibiotics. She has an IUD.    She has not had recent lower extremity swelling.      Lluvia has had pleural effusions in the past and has very small diffuse pulmonary micro-AVMs, mild cyanosis and mild exercise intolerance. The fenestration remains of moderate size.The pulmonary micro-AVMs were less obvious on the recent cath and her pulmonary veins were fully saturated.     Several family members have thyroid problems.     Latest hepatology evaluation/follow up was unremarkable.     Review of Systems   Constitutional: Negative.   HENT: Negative.     Eyes: Negative.    Cardiovascular:  Positive for cyanosis.   Respiratory: Negative.     Endocrine: Negative.    Hematologic/Lymphatic: Negative.    Skin: Negative.    Musculoskeletal: Negative.    Gastrointestinal: Negative.    Genitourinary: Negative.    Neurological: Negative.    Psychiatric/Behavioral: Negative.     Allergic/Immunologic: Negative.       Objective:    Physical Exam  Constitutional:       General: She is not in acute distress.     Appearance: She is well-developed. She is not diaphoretic.      Comments: Mild cyanosis.   HENT:      Head: Normocephalic and atraumatic.      Right Ear: External ear normal.      Left Ear: External ear normal.      Nose: Nose normal.      Mouth/Throat:      Pharynx: No oropharyngeal exudate.   Eyes:      General: No scleral icterus.        Right eye: No " discharge.         Left eye: No discharge.      Conjunctiva/sclera: Conjunctivae normal.      Pupils: Pupils are equal, round, and reactive to light.   Neck:      Thyroid: No thyromegaly.      Vascular: No JVD.      Trachea: No tracheal deviation.   Cardiovascular:      Rate and Rhythm: Normal rate.      Pulses: Intact distal pulses.           Radial pulses are 2+ on the right side.        Femoral pulses are 2+ on the right side.     Heart sounds: S1 normal. Murmur heard.   High-pitched blowing holosystolic murmur is present with a grade of 1/6 at the lower left sternal border. Single S2     No friction rub. No gallop.   Pulmonary:      Effort: Pulmonary effort is normal. No respiratory distress.      Breath sounds: Normal breath sounds. No stridor. No wheezing or rales.   Chest:      Chest wall: No tenderness.   Abdominal:      General: Bowel sounds are normal. There is no distension.      Palpations: Abdomen is soft. There is no mass.      Tenderness: There is no abdominal tenderness. There is no guarding or rebound.   Musculoskeletal:         General: No tenderness. Normal range of motion.      Cervical back: Normal range of motion and neck supple.   Lymphadenopathy:      Cervical: No cervical adenopathy.   Skin:     General: Skin is warm and dry.      Coloration: Skin is not pale.      Findings: No erythema or rash.   Neurological:      Mental Status: She is alert and oriented to person, place, and time.      Cranial Nerves: No cranial nerve deficit.      Motor: No abnormal muscle tone.      Coordination: Coordination normal.   Psychiatric:         Behavior: Behavior normal.         Thought Content: Thought content normal.         Judgment: Judgment normal.   Sats 87%        Liver US (4/19/2021): normal/unremarkable (mild hepato-splenomegaly as expected post-Fontan).  Labs (2/14/2022) unremarkable except continued mild erythrocythemia ([id number]) and improved total protein (8.1)    ECG: NSR, RVH  ECHO:  Hypoplastic left heart syndrome s/p Fontan. LPA stent, coarctation stent.  No significant change from last echocardiogram.  Laminar flow with no obvious thrombus or obstruction in left innominate vein , right SVC, IVC , Fontan conduit, pulmonary  confluence, stented LPA, proximal RPA, Fontan anastomosis or Abel anastomosis.  LPA distal to stent with laminar flow by color doppler. .  Color doppler demonstrates Fontan fenestration with continuous flow to right atrium at peak velocity <1.5 m/sec. and mean  gradient <4.5 mmHg.  Unobstructed return of pulmonary venous return across large atrial communication to tricuspid valve  Dilated right ventricle, mild.  Thickened right ventricle free wall, moderate.  Qualitatively good systemic right ventricular systolic function, appears slightly improved.  Normal neoaortic valve velocity.  Mild neoaortic valve insufficiency.  Large ascending aorta post Jackie.  Stent noted in descending aorta with peak velocity <2.4 m/sec. and trivial diastolic run off.  No pericardial effusion.    Assessment:       1. HLHS (hypoplastic left heart syndrome), s/p fenestrated Fontan   2. Aorta coarctation, relieved with stent    3. Pulmonary artery stenosis of central branch, relieved with stent    4. Diffuse pulmonary micro-AVMs (arteriovenous malformation)    5. 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 Post-Fontan protein-losing enteropathy, resolved    7. S/P Fontan procedure    8. S/P Fontan conduit stent   9. History of palpitations, quiescent   10. Anxiety/Depression        Plan:       1. I reviewed today's findings in detail. We reviewed adult issues with CHD in detail including overall cardiac health, mortality issues, anticoagulation, contraception and pregnancy.  2. Same medications (digoxin, lasix, sildenafil, aldactone, aspirin).  3. SBE precautions prn.  4. Treat as normal from a cardiac standpoint, encouraged increased exercise.  5. Recheck in 6 months with ECG, ECHO and Holter,  CBC, CMP, TFTs (T4/TSH).  6. Follow up with Dr. Ac today for liver evaluation, yearly thereafter.

## 2022-12-15 ENCOUNTER — PATIENT MESSAGE (OUTPATIENT)
Dept: OPTOMETRY | Facility: CLINIC | Age: 22
End: 2022-12-15
Payer: COMMERCIAL

## 2022-12-29 LAB
OHS CV EVENT MONITOR DAY: 1
OHS CV HOLTER HOOKUP DATE: NORMAL
OHS CV HOLTER HOOKUP TIME: NORMAL
OHS CV HOLTER LENGTH DECIMAL HOURS: 26
OHS CV HOLTER LENGTH HOURS: 2
OHS CV HOLTER LENGTH MINUTES: 0
OHS CV HOLTER SCAN DATE: NORMAL
OHS CV HOLTER SINUS AVERAGE HR: 80 BPM
OHS CV HOLTER SINUS MAX HR: 121 BPM
OHS CV HOLTER SINUS MIN HR: 60 BPM
OHS CV HOLTER STUDY END DATE: NORMAL
OHS CV HOLTER STUDY END TIME: NORMAL

## 2023-03-02 ENCOUNTER — PATIENT MESSAGE (OUTPATIENT)
Dept: PEDIATRIC CARDIOLOGY | Facility: CLINIC | Age: 23
End: 2023-03-02
Payer: COMMERCIAL

## 2023-03-02 ENCOUNTER — PATIENT MESSAGE (OUTPATIENT)
Dept: PEDIATRICS | Facility: CLINIC | Age: 23
End: 2023-03-02
Payer: COMMERCIAL

## 2023-03-02 DIAGNOSIS — Q25.6 PULMONARY ARTERY STENOSIS OF CENTRAL BRANCH: ICD-10-CM

## 2023-03-02 DIAGNOSIS — Q23.4 HLHS (HYPOPLASTIC LEFT HEART SYNDROME): Primary | ICD-10-CM

## 2023-03-02 DIAGNOSIS — K90.49 POST-FONTAN PROTEIN-LOSING ENTEROPATHY: ICD-10-CM

## 2023-03-02 DIAGNOSIS — Z98.890 POST-FONTAN PROTEIN-LOSING ENTEROPATHY: ICD-10-CM

## 2023-03-28 ENCOUNTER — PATIENT MESSAGE (OUTPATIENT)
Dept: PEDIATRIC CARDIOLOGY | Facility: CLINIC | Age: 23
End: 2023-03-28
Payer: COMMERCIAL

## 2023-04-13 ENCOUNTER — OFFICE VISIT (OUTPATIENT)
Dept: URGENT CARE | Facility: CLINIC | Age: 23
End: 2023-04-13
Payer: COMMERCIAL

## 2023-04-13 VITALS
RESPIRATION RATE: 18 BRPM | TEMPERATURE: 98 F | WEIGHT: 192 LBS | DIASTOLIC BLOOD PRESSURE: 77 MMHG | OXYGEN SATURATION: 87 % | BODY MASS INDEX: 30.13 KG/M2 | SYSTOLIC BLOOD PRESSURE: 116 MMHG | HEART RATE: 98 BPM | HEIGHT: 67 IN

## 2023-04-13 DIAGNOSIS — J02.9 SORE THROAT: ICD-10-CM

## 2023-04-13 DIAGNOSIS — B96.89 BACTERIAL SINUSITIS: Primary | ICD-10-CM

## 2023-04-13 DIAGNOSIS — J32.9 BACTERIAL SINUSITIS: Primary | ICD-10-CM

## 2023-04-13 LAB
CTP QC/QA: YES
MOLECULAR STREP A: NEGATIVE

## 2023-04-13 PROCEDURE — 99213 OFFICE O/P EST LOW 20 MIN: CPT | Mod: ,,, | Performed by: FAMILY MEDICINE

## 2023-04-13 PROCEDURE — 87651 STREP A DNA AMP PROBE: CPT | Mod: QW,,, | Performed by: FAMILY MEDICINE

## 2023-04-13 PROCEDURE — 99213 PR OFFICE/OUTPT VISIT, EST, LEVL III, 20-29 MIN: ICD-10-PCS | Mod: ,,, | Performed by: FAMILY MEDICINE

## 2023-04-13 PROCEDURE — 87651 POCT STREP A MOLECULAR: ICD-10-PCS | Mod: QW,,, | Performed by: FAMILY MEDICINE

## 2023-04-13 RX ORDER — AMOXICILLIN AND CLAVULANATE POTASSIUM 875; 125 MG/1; MG/1
1 TABLET, FILM COATED ORAL EVERY 12 HOURS
Qty: 20 TABLET | Refills: 0 | Status: SHIPPED | OUTPATIENT
Start: 2023-04-13 | End: 2023-04-23

## 2023-04-13 RX ORDER — SERTRALINE HYDROCHLORIDE 25 MG/1
TABLET, FILM COATED ORAL
Status: ON HOLD | COMMUNITY
Start: 2022-12-06 | End: 2023-10-05 | Stop reason: HOSPADM

## 2023-04-13 NOTE — PROGRESS NOTES
"Subjective:      Patient ID: Jil Lennon is a 22 y.o. female.    Vitals:  height is 5' 7" (1.702 m) and weight is 87.1 kg (192 lb). Her temperature is 98.1 °F (36.7 °C). Her blood pressure is 116/77 and her pulse is 98. Her respiration is 18 and oxygen saturation is 87% (abnormal).     Chief Complaint: Sore Throat    22 y.o. female presents to clinic w/ c/o sore throat x2wks, Positive strep exposure.  Also with congestion sinus pressure and sinus headache for the past 2 weeks.  Reports postnasal drip.  Denies fever, neck stiffness, body aches, chills, cough, congestion, wheezing, SOB, CP, N/V/D, abdominal pain and rash.  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    Sore Throat     Constitution: Negative.   HENT:  Positive for postnasal drip, sinus pressure and sore throat.    Cardiovascular: Negative.    Eyes: Negative.    Respiratory: Negative.     Gastrointestinal: Negative.    Genitourinary: Negative.    Musculoskeletal: Negative.    Skin: Negative.    Allergic/Immunologic: Negative.    Neurological: Negative.    Hematologic/Lymphatic: Negative.     Objective:     Physical Exam   Constitutional: She is oriented to person, place, and time. She appears well-developed. She is cooperative.  Non-toxic appearance. She does not appear ill. No distress.   HENT:   Head: Normocephalic and atraumatic.   Ears:   Right Ear: Hearing and external ear normal.   Left Ear: Hearing and external ear normal.   Mouth/Throat: Mucous membranes are normal. Posterior oropharyngeal erythema (postnasal drip) present.   Eyes: Conjunctivae and lids are normal.   Neck: Trachea normal and phonation normal. Neck supple. No edema present. No erythema present. No neck rigidity present.   Cardiovascular: Normal rate.   Pulmonary/Chest: Effort normal and breath sounds normal. No stridor. No respiratory distress. She has no decreased breath sounds. She has no wheezes. She has no rhonchi. She has no rales. "   Abdominal: Normal appearance.   Lymphadenopathy:     She has cervical adenopathy.   Neurological: She is alert and oriented to person, place, and time. She exhibits normal muscle tone. Coordination normal.   Skin: Skin is warm, dry, intact, not diaphoretic and no rash.   Psychiatric: Her speech is normal and behavior is normal. Mood, judgment and thought content normal.   Nursing note and vitals reviewed.       Previous History      Review of patient's allergies indicates:   Allergen Reactions    Adhesive Dermatitis       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    Chylothorax     Congenital absence of pulmonary artery     to [facility]    Dyspnea     Encounter for blood transfusion     Fracture of wrist     x4    General anesthetics causing adverse effect in therapeutic use     was mean as a child when waking up after tonsillectomy    Hypoplastic left heart     Liver disease     enlarged liver    Obstructive sleep apnea     resolved by T&A    Obstructive sleep apnea (adult) (pediatric)     Post-Fontan protein-losing enteropathy     Stroke     mother states possibly had stroke at 3 y/o    Tonsillar and adenoid hypertrophy      Current Outpatient Medications   Medication Instructions    amoxicillin-clavulanate 875-125mg (AUGMENTIN) 875-125 mg per tablet 1 tablet, Oral, Every 12 hours    aspirin 81 mg, Oral, Nightly    digoxin (LANOXIN) 125 mcg tablet TAKE 1 TABLET BY MOUTH EVERY DAY IN THE EVENING    enalapril (VASOTEC) 2.5 MG tablet TAKE 1 TABLET BY MOUTH TWICE A DAY    furosemide (LASIX) 20 MG tablet TAKE 1 TABLET (20 MG TOTAL) BY MOUTH 2 (TWO) TIMES A DAY.    sertraline (ZOLOFT) 25 MG tablet Oral    sertraline (ZOLOFT) 50 mg, Oral, Daily    sildenafil (REVATIO) 20 mg, Oral, 2 times daily, (pt taking twice a day)    spironolactone (ALDACTONE) 25 MG tablet TAKE 1 TABLET BY MOUTH TWICE A DAY    tretinoin (RETIN-A) 0.025 % gel Topical (Top),  Nightly    VRAYLAR 1.5 mg Cap 1 capsule, Oral, Daily     Past Surgical History:   Procedure Laterality Date    BIDIRECTIONAL JOSE W/ ATRIAL SEPTECTOMY      [facility]    CARDIAC SURGERY      CATHETER REFENESTRATION  1/11/2005     OF    COARCTATION STENT  3/9/11    COLONOSCOPY N/A 5/27/2021    Procedure: COLONOSCOPY;  Surgeon: Benigno Bowers MD;  [facility]  Service: Endoscopy;  Laterality: N/A;    COMBINED RIGHT AND RETROGRADE LEFT HEART CATHETERIZATION FOR CONGENITAL HEART DEFECT N/A 3/7/2019    Procedure: CATHETERIZATION, HEART, COMBINED RIGHT AND RETROGRADE LEFT, FOR CONGENITAL HEART DEFECT;  Surgeon: Jimi Pollard Jr., MD;  Location: [facility] CATH LAB;  Service: Cardiology;  Laterality: N/A;  ped heart    ESOPHAGOGASTRODUODENOSCOPY N/A 5/27/2021    Procedure: EGD (ESOPHAGOGASTRODUODENOSCOPY);  Surgeon: Benigno Bowers MD;  [facility]  Service: Endoscopy;  Laterality: N/A;  Patient will need pediatric heart anesthesiologist due to history of Fontan     mother states having formed stool         COVID test at [facility] on 5/24-GT    FONTAN PROCEDURE, EXTRACARDIAC  9/27/2004    [facility]    LPA     RE CONSTRUCTION      [facility]    LPA STENT  2/26/.2009    KIRSTEN damian/ mitzi  age 3 days     done at [facility]    TONSILLECTOMY, ADENOIDECTOMY  11/2011    WISDOM TOOTH EXTRACTION       Family History   Problem Relation Age of Onset    No Known Problems Father     Urologic Abnormality Other     Thyroid disease Mother     No Known Problems Maternal Grandmother     Hypertension Maternal Grandfather     Skin cancer Maternal Grandfather     Hypertension Paternal Grandmother     Glaucoma Paternal Grandmother     No Known Problems Sister     No Known Problems Brother     No Known Problems Maternal Aunt     No Known Problems Maternal Uncle     No Known Problems Paternal Aunt     No Known Problems Paternal Uncle     No Known Problems Paternal Grandfather     Cancer Other 80         "colon    Heart disease Neg Hx     Diabetes Neg Hx     Retinal detachment Neg Hx     Amblyopia Neg Hx     Blindness Neg Hx     Strabismus Neg Hx     Macular degeneration Neg Hx     Stroke Neg Hx     Breast cancer Neg Hx     Ovarian cancer Neg Hx     Esophageal cancer Neg Hx        Social History     Tobacco Use    Smoking status: Every Day     Types: Vaping with nicotine    Smokeless tobacco: Never    Tobacco comments:     No TAMMY   Substance Use Topics    Alcohol use: Yes     Comment: occassional    Drug use: No        Physical Exam      Vital Signs Reviewed   /77   Pulse 98   Temp 98.1 °F (36.7 °C)   Resp 18   Ht 5' 7" (1.702 m)   Wt 87.1 kg (192 lb)   SpO2 (!) 87%   BMI 30.07 kg/m²        Procedures    Procedures     Labs     Results for orders placed or performed during the hospital encounter of 12/12/22   3-14 Day Pediatric Holter Monitor   Result Value Ref Range    Holter Hookup Date 12,122,022     Holter Hookup Time 124,230     Holter Study End Date 12,132,022     Holter Study End Time 155,910     Holter Scan Date 12,272,022     Sinus min HR 60 bpm    Sinus max hr 121 bpm    Sinus avg hr 80 bpm    Event Monitor Day 1     Holter length hours 2     holter length minutes 0     holter length dec hours 26.00        Assessment:     1. Bacterial sinusitis    2. Sore throat        Plan:   Strep negative  Medications sent to pharmacy  Start taking an allergy pill daily such as claritin, zyrtec, allegrea or xyzal. Also start using a nasal steroid spray such as flonase or nasacort daily. If you are not being treated for high blood pressure, you can also take decongestant such as sudafed as needed. They can be purchased over the counter. If oral steroids were prescribed, start them tomorrow morning. Monitor for fever. Take tylenol/acetaminophen or ibuprofen as needed. Rest and hydrate. If symptoms persist or worsen, return to clinic or seek medical attention immediately.       Bacterial sinusitis    Sore " throat  -     POCT Strep A, Molecular    Other orders  -     amoxicillin-clavulanate 875-125mg (AUGMENTIN) 875-125 mg per tablet; Take 1 tablet by mouth every 12 (twelve) hours. for 10 days  Dispense: 20 tablet; Refill: 0

## 2023-04-13 NOTE — PATIENT INSTRUCTIONS
Plan:   Strep negative  Medications sent to pharmacy  Start taking an allergy pill daily such as claritin, zyrtec, allegrea or xyzal. Also start using a nasal steroid spray such as flonase or nasacort daily. If you are not being treated for high blood pressure, you can also take decongestant such as sudafed as needed. They can be purchased over the counter. If oral steroids were prescribed, start them tomorrow morning. Monitor for fever. Take tylenol/acetaminophen or ibuprofen as needed. Rest and hydrate. If symptoms persist or worsen, return to clinic or seek medical attention immediately.

## 2023-07-17 ENCOUNTER — HOSPITAL ENCOUNTER (OUTPATIENT)
Dept: PEDIATRIC CARDIOLOGY | Facility: HOSPITAL | Age: 23
Discharge: HOME | End: 2023-07-17
Attending: PEDIATRICS
Payer: COMMERCIAL

## 2023-07-17 ENCOUNTER — OFFICE VISIT (OUTPATIENT)
Dept: OPTOMETRY | Facility: CLINIC | Age: 23
End: 2023-07-17
Payer: COMMERCIAL

## 2023-07-17 ENCOUNTER — OFFICE VISIT (OUTPATIENT)
Dept: PEDIATRIC CARDIOLOGY | Facility: CLINIC | Age: 23
End: 2023-07-17
Attending: PEDIATRICS
Payer: COMMERCIAL

## 2023-07-17 ENCOUNTER — CLINICAL SUPPORT (OUTPATIENT)
Dept: PEDIATRIC CARDIOLOGY | Facility: CLINIC | Age: 23
End: 2023-07-17
Payer: COMMERCIAL

## 2023-07-17 VITALS
HEART RATE: 82 BPM | WEIGHT: 192.56 LBS | DIASTOLIC BLOOD PRESSURE: 81 MMHG | HEIGHT: 69 IN | SYSTOLIC BLOOD PRESSURE: 142 MMHG | OXYGEN SATURATION: 82 % | BODY MASS INDEX: 28.52 KG/M2

## 2023-07-17 DIAGNOSIS — Q23.4 HLHS (HYPOPLASTIC LEFT HEART SYNDROME): ICD-10-CM

## 2023-07-17 DIAGNOSIS — K90.49 POST-FONTAN PROTEIN-LOSING ENTEROPATHY: ICD-10-CM

## 2023-07-17 DIAGNOSIS — Q25.1 AORTA COARCTATION: ICD-10-CM

## 2023-07-17 DIAGNOSIS — H47.333 CROWDED OPTIC DISC, BILATERAL: Primary | ICD-10-CM

## 2023-07-17 DIAGNOSIS — Q25.6 PULMONARY ARTERY STENOSIS OF CENTRAL BRANCH: ICD-10-CM

## 2023-07-17 DIAGNOSIS — Z98.890 POST-FONTAN PROTEIN-LOSING ENTEROPATHY: ICD-10-CM

## 2023-07-17 DIAGNOSIS — Q25.79: ICD-10-CM

## 2023-07-17 DIAGNOSIS — K76.1 CHRONIC PASSIVE CONGESTION OF LIVER: ICD-10-CM

## 2023-07-17 DIAGNOSIS — Z46.0 FITTING AND ADJUSTMENT OF SPECTACLES AND CONTACT LENSES: Primary | ICD-10-CM

## 2023-07-17 DIAGNOSIS — Q23.4 HLHS (HYPOPLASTIC LEFT HEART SYNDROME): Primary | ICD-10-CM

## 2023-07-17 DIAGNOSIS — H35.412 LATTICE DEGENERATION OF RETINA, LEFT EYE: ICD-10-CM

## 2023-07-17 LAB — BSA FOR ECHO PROCEDURE: 2.05 M2

## 2023-07-17 PROCEDURE — 99999 PR PBB SHADOW E&M-EST. PATIENT-LVL III: ICD-10-PCS | Mod: PBBFAC,,, | Performed by: PEDIATRICS

## 2023-07-17 PROCEDURE — 93244 EXT ECG>48HR<7D REV&INTERPJ: CPT | Mod: ,,, | Performed by: PEDIATRICS

## 2023-07-17 PROCEDURE — 93303 PEDIATRIC ECHO (CUPID ONLY): ICD-10-PCS | Mod: 26,,, | Performed by: PEDIATRICS

## 2023-07-17 PROCEDURE — 92015 PR REFRACTION: ICD-10-PCS | Mod: S$GLB,,, | Performed by: OPTOMETRIST

## 2023-07-17 PROCEDURE — 99999 PR PBB SHADOW E&M-EST. PATIENT-LVL III: CPT | Mod: PBBFAC,,, | Performed by: PEDIATRICS

## 2023-07-17 PROCEDURE — 99999 PR PBB SHADOW E&M-EST. PATIENT-LVL I: ICD-10-PCS | Mod: PBBFAC,,,

## 2023-07-17 PROCEDURE — 99215 OFFICE O/P EST HI 40 MIN: CPT | Mod: 25,S$GLB,, | Performed by: PEDIATRICS

## 2023-07-17 PROCEDURE — 99215 PR OFFICE/OUTPT VISIT, EST, LEVL V, 40-54 MIN: ICD-10-PCS | Mod: 25,S$GLB,, | Performed by: PEDIATRICS

## 2023-07-17 PROCEDURE — 93000 EKG 12-LEAD PEDIATRIC: ICD-10-PCS | Mod: S$GLB,,, | Performed by: PEDIATRICS

## 2023-07-17 PROCEDURE — 93244 CV 3-14 DAY PEDIATRIC HOLTER MONITOR (CUPID ONLY): ICD-10-PCS | Mod: ,,, | Performed by: PEDIATRICS

## 2023-07-17 PROCEDURE — 99214 PR OFFICE/OUTPT VISIT, EST, LEVL IV, 30-39 MIN: ICD-10-PCS | Mod: S$GLB,,, | Performed by: OPTOMETRIST

## 2023-07-17 PROCEDURE — 93320 PEDIATRIC ECHO (CUPID ONLY): ICD-10-PCS | Mod: 26,,, | Performed by: PEDIATRICS

## 2023-07-17 PROCEDURE — 92201 OPSCPY EXTND RTA DRAW UNI/BI: CPT | Mod: S$GLB,,, | Performed by: OPTOMETRIST

## 2023-07-17 PROCEDURE — 93000 ELECTROCARDIOGRAM COMPLETE: CPT | Mod: S$GLB,,, | Performed by: PEDIATRICS

## 2023-07-17 PROCEDURE — 93303 ECHO TRANSTHORACIC: CPT | Mod: 26,,, | Performed by: PEDIATRICS

## 2023-07-17 PROCEDURE — 93325 PEDIATRIC ECHO (CUPID ONLY): ICD-10-PCS | Mod: 26,,, | Performed by: PEDIATRICS

## 2023-07-17 PROCEDURE — 92201 PR OPHTHALMOSCOPY, EXT, W/RET DRAW/SCLERAL DEPR, I&R, UNI/BI: ICD-10-PCS | Mod: S$GLB,,, | Performed by: OPTOMETRIST

## 2023-07-17 PROCEDURE — 93325 DOPPLER ECHO COLOR FLOW MAPG: CPT | Mod: 26,,, | Performed by: PEDIATRICS

## 2023-07-17 PROCEDURE — 93242 EXT ECG>48HR<7D RECORDING: CPT

## 2023-07-17 PROCEDURE — 93320 DOPPLER ECHO COMPLETE: CPT | Mod: 26,,, | Performed by: PEDIATRICS

## 2023-07-17 PROCEDURE — 99214 OFFICE O/P EST MOD 30 MIN: CPT | Mod: S$GLB,,, | Performed by: OPTOMETRIST

## 2023-07-17 PROCEDURE — 92310 CONTACT LENS FITTING OU: CPT | Mod: CSM,S$GLB,, | Performed by: OPTOMETRIST

## 2023-07-17 PROCEDURE — 93325 DOPPLER ECHO COLOR FLOW MAPG: CPT

## 2023-07-17 PROCEDURE — 99999 PR PBB SHADOW E&M-EST. PATIENT-LVL III: ICD-10-PCS | Mod: PBBFAC,,, | Performed by: OPTOMETRIST

## 2023-07-17 PROCEDURE — 99999 PR PBB SHADOW E&M-EST. PATIENT-LVL I: CPT | Mod: PBBFAC,,,

## 2023-07-17 PROCEDURE — 92015 DETERMINE REFRACTIVE STATE: CPT | Mod: S$GLB,,, | Performed by: OPTOMETRIST

## 2023-07-17 PROCEDURE — 92310 PR CONTACT LENS FITTING (NO CHANGE): ICD-10-PCS | Mod: CSM,S$GLB,, | Performed by: OPTOMETRIST

## 2023-07-17 PROCEDURE — 99999 PR PBB SHADOW E&M-EST. PATIENT-LVL III: CPT | Mod: PBBFAC,,, | Performed by: OPTOMETRIST

## 2023-07-17 NOTE — PROGRESS NOTES
Subjective:    Patient ID:  Jil Lennon is a 22 y.o. female who presents for  scheduled follow-up. She came today alone. She has had some palpitations recently. She is wondering about restarting Vyvanse to help with energy and attention.    She has HLHS s/p staged palliation with late catheter based re-fenestration at 4 years of age for anasarca/PLE, coarctation stent, LPA stent and Fontan conduit stent (3/7/2019). She is doing very well overall now from a cardiac standpoint but has significant anxiety and eating disorder.     She has a history of abdominal complaints with small bowel bacterial overgrowth syndrome treated with antibiotics. She has an IUD.    She has not had recent lower extremity swelling.      Lluvia has had pleural effusions in the past and has very small diffuse pulmonary micro-AVMs, mild cyanosis and mild exercise intolerance. The fenestration remains of moderate size.The pulmonary micro-AVMs were less obvious on the recent cath and her pulmonary veins were fully saturated.     Several family members have thyroid problems.     Latest hepatology evaluation/follow up was unremarkable.     Review of Systems   Constitutional: Negative.   HENT: Negative.     Eyes: Negative.    Cardiovascular:  Positive for cyanosis.   Respiratory: Negative.     Endocrine: Negative.    Hematologic/Lymphatic: Negative.    Skin: Negative.    Musculoskeletal: Negative.    Gastrointestinal: Negative.    Genitourinary: Negative.    Neurological: Negative.    Psychiatric/Behavioral: Negative.     Allergic/Immunologic: Negative.       Objective:    Physical Exam  Constitutional:       General: She is not in acute distress.     Appearance: She is well-developed. She is not diaphoretic.      Comments: Mild cyanosis.   HENT:      Head: Normocephalic and atraumatic.      Right Ear: External ear normal.      Left Ear: External ear normal.      Nose: Nose normal.      Mouth/Throat:      Pharynx: No oropharyngeal exudate.    Eyes:      General: No scleral icterus.        Right eye: No discharge.         Left eye: No discharge.      Conjunctiva/sclera: Conjunctivae normal.      Pupils: Pupils are equal, round, and reactive to light.   Neck:      Thyroid: No thyromegaly.      Vascular: No JVD.      Trachea: No tracheal deviation.   Cardiovascular:      Rate and Rhythm: Normal rate.      Pulses: Intact distal pulses.           Radial pulses are 2+ on the right side.        Femoral pulses are 2+ on the right side.     Heart sounds: S1 normal. Murmur heard.   High-pitched blowing holosystolic murmur is present with a grade of 1/6 at the lower left sternal border. Single S2     No friction rub. No gallop.   Pulmonary:      Effort: Pulmonary effort is normal. No respiratory distress.      Breath sounds: Normal breath sounds. No stridor. No wheezing or rales.   Chest:      Chest wall: No tenderness.   Abdominal:      General: Bowel sounds are normal. There is no distension.      Palpations: Abdomen is soft. There is no mass.      Tenderness: There is no abdominal tenderness. There is no guarding or rebound.   Musculoskeletal:         General: No tenderness. Normal range of motion.      Cervical back: Normal range of motion and neck supple.   Lymphadenopathy:      Cervical: No cervical adenopathy.   Skin:     General: Skin is warm and dry.      Coloration: Skin is not pale.      Findings: No erythema or rash.   Neurological:      Mental Status: She is alert and oriented to person, place, and time.      Cranial Nerves: No cranial nerve deficit.      Motor: No abnormal muscle tone.      Coordination: Coordination normal.   Psychiatric:         Behavior: Behavior normal.         Thought Content: Thought content normal.         Judgment: Judgment normal.   Sats 82%        Liver US (4/19/2021): normal/unremarkable (mild hepato-splenomegaly as expected post-Fontan).  Labs (2/14/2022) unremarkable except continued mild erythrocythemia  ([id number]) and improved total protein (8.1)    ECG: NSR, RVH  ECHO: Hypoplastic left heart syndrome s/p Fontan. LPA stent, coarctation stent.  No significant change from last echocardiogram.  Laminar flow with no obvious thrombus or obstruction in left innominate vein , right SVC, IVC , Fontan conduit, pulmonary  confluence, stented LPA, proximal RPA, Fontan anastomosis or Abel anastomosis.  LPA distal to stent with laminar flow by color doppler. .  Color doppler demonstrates Fontan fenestration with continuous flow to right atrium at peak velocity <1.5 m/sec. and mean  gradient <4.5 mmHg.  Unobstructed return of pulmonary venous return across large atrial communication to tricuspid valve  Dilated right ventricle, mild.  Thickened right ventricle free wall, moderate.  Qualitatively good systemic right ventricular systolic function, appears slightly improved.  Normal neoaortic valve velocity.  Mild neoaortic valve insufficiency.  Large ascending aorta post [place].  Stent noted in descending aorta with peak velocity <2.4 m/sec. and trivial diastolic run off.  No pericardial effusion.    Assessment:       1. HLHS (hypoplastic left heart syndrome), s/p fenestrated Fontan   2. Aorta coarctation, relieved with stent    3. Pulmonary artery stenosis of central branch, relieved with stent    4. Diffuse pulmonary micro-AVMs (arteriovenous malformation)    5. 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 Post-Fontan protein-losing enteropathy, resolved    7. S/P Fontan procedure    8. S/P Fontan conduit stent   9. History of palpitations, quiescent   10. Anxiety/Depression        Plan:       1. I reviewed today's findings in detail. We reviewed adult issues with CHD in detail including overall cardiac health, mortality issues, anticoagulation, contraception and pregnancy.  2. Same medications (digoxin, lasix, sildenafil, aldactone, aspirin).  3. Three day holter today, will review restarting Vyvanse after Holter  4. SBE  precautions prn.  5. Treat as normal from a cardiac standpoint, encouraged increased exercise.  6. Recheck in 6 months with ECG, ECHO and Holter,   7. Check CBC, CMP, TFTs (T4/TSH) today.  8. Follow up with Dr. Ac today for liver evaluation, yearly thereafter.

## 2023-07-17 NOTE — PROGRESS NOTES
"HPI    Jil Lennon is a 22 y.o. female who returns for continued eye care.   She has congenital heart disease which is treated with sildenafil. She   also has small, crowded optic nerves ("disc at risk") which increase her   risk of retinal vascular occlusion on sildenafil. She has experienced blue     entopic phenomena in the past, but does so rarely now. Jil also has   high bilateral myopia.  Glasses and Precision 1 contact lenses are   prescribed.  She wears contacts more than glasses.  Her last exam with me   was on 2/12/2022. Today, she reports that it is difficult for her to see   at night. She relays that comfort, handling and daytime vision with the   contact lenses are good     (+)blurred vision - at night  (--)Headaches  (--)diplopia  (--)flashes  (--)floaters  (--)pain  (--)Itching  (--)tearing  (--)burning  (--)Dryness  (--) OTC Drops  (--)Photophobia     Last edited by Petar Mcelroy, OD on 7/17/2023  3:50 PM.      For exam results, see encounter report    Assessment /Plan    1. 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 High, Bilateral Myopia   - Spec Rx per final Rx below   Glasses Prescription (7/17/2023)          Sphere Cylinder    Right -7.00 Sphere    Left -6.75 Sphere      Type: SVL    Expiration Date: 7/17/2024          - Good fit with Precision 1   Trials dispensed: -7.00, -6.50 for home trial    Patient education;[facility]  RTC in 6 months for optic nerve check, sooner prn    -------------------Addendum 1/24/24----------------------------    CLRx per below for daily disposal/replacement    -Advised against overnight " wear, risks of overnight wear explained;     -Systane Balance, Soothe XP, Refresh Optive Advanced artificial tears for comfort prn;    -Optifree Puremoist, Biotrue , or Acuvue RevitaLens solutions recommended    Contact Lens Prescription (7/17/2023)          Brand Base Curve Diameter Sphere    Right Precision1 8.3 14.2 -7.00    Left Precision1 8.3 14.2 -7.00      Expiration Date: 7/24/2024    Replacement: Daily    Solutions: OptiFree PureMoist    Wearing Schedule: Daily Wear           RTC in 6 months for optic nerve check, sooner prn

## 2023-08-07 LAB
OHS CV EVENT MONITOR DAY: 3
OHS CV HOLTER HOOKUP DATE: NORMAL
OHS CV HOLTER HOOKUP TIME: NORMAL
OHS CV HOLTER LENGTH DECIMAL HOURS: 74
OHS CV HOLTER LENGTH HOURS: 2
OHS CV HOLTER LENGTH MINUTES: 0
OHS CV HOLTER SCAN DATE: NORMAL
OHS CV HOLTER SINUS AVERAGE HR: 76 BPM
OHS CV HOLTER SINUS MAX HR: 148 BPM
OHS CV HOLTER SINUS MIN HR: 54 BPM
OHS CV HOLTER STUDY END DATE: NORMAL
OHS CV HOLTER STUDY END TIME: NORMAL

## 2023-10-03 ENCOUNTER — HOSPITAL ENCOUNTER (INPATIENT)
Facility: HOSPITAL | Age: 23
LOS: 2 days | Discharge: HOME | DRG: 069 | End: 2023-10-05
Attending: EMERGENCY MEDICINE | Admitting: INTERNAL MEDICINE
Payer: COMMERCIAL

## 2023-10-03 DIAGNOSIS — Q23.4 HYPOPLASTIC LEFT HEART: ICD-10-CM

## 2023-10-03 DIAGNOSIS — G45.9 TIA (TRANSIENT ISCHEMIC ATTACK): Primary | ICD-10-CM

## 2023-10-03 DIAGNOSIS — I63.9 STROKE: ICD-10-CM

## 2023-10-03 DIAGNOSIS — I63.9 ACUTE CVA (CEREBROVASCULAR ACCIDENT): ICD-10-CM

## 2023-10-03 LAB
ALBUMIN SERPL-MCNC: 4.5 G/DL (ref 3.5–5)
ALBUMIN/GLOB SERPL: 2 RATIO (ref 1.1–2)
ALP SERPL-CCNC: 71 UNIT/L (ref 40–150)
ALT SERPL-CCNC: 26 UNIT/L (ref 0–55)
ANION GAP SERPL CALC-SCNC: 17 MMOL/L (ref 8–16)
AST SERPL-CCNC: 14 UNIT/L (ref 5–34)
BASOPHILS # BLD AUTO: 0.04 X10(3)/MCL
BASOPHILS NFR BLD AUTO: 0.2 %
BILIRUB SERPL-MCNC: 0.6 MG/DL
BUN SERPL-MCNC: 16.2 MG/DL (ref 7–18.7)
BUN SERPL-MCNC: 27 MG/DL (ref 6–30)
CALCIUM SERPL-MCNC: 9.5 MG/DL (ref 8.4–10.2)
CHLORIDE SERPL-SCNC: 105 MMOL/L (ref 95–110)
CHLORIDE SERPL-SCNC: 110 MMOL/L (ref 98–107)
CHOLEST SERPL-MCNC: 138 MG/DL
CHOLEST/HDLC SERPL: 4 {RATIO} (ref 0–5)
CO2 SERPL-SCNC: 19 MMOL/L (ref 22–29)
CREAT SERPL-MCNC: 0.5 MG/DL (ref 0.5–1.4)
CREAT SERPL-MCNC: 0.67 MG/DL (ref 0.55–1.02)
CRP SERPL HS-MCNC: 0.32 MG/L
EOSINOPHIL # BLD AUTO: 0.08 X10(3)/MCL (ref 0–0.9)
EOSINOPHIL NFR BLD AUTO: 0.5 %
ERYTHROCYTE [DISTWIDTH] IN BLOOD BY AUTOMATED COUNT: 12.7 % (ref 11.5–17)
GFR SERPLBLD CREATININE-BSD FMLA CKD-EPI: >60 MLS/MIN/1.73/M2
GLOBULIN SER-MCNC: 2.3 GM/DL (ref 2.4–3.5)
GLUCOSE SERPL-MCNC: 110 MG/DL (ref 74–100)
GLUCOSE SERPL-MCNC: 114 MG/DL (ref 70–110)
HCT VFR BLD AUTO: 46.9 % (ref 37–47)
HCT VFR BLD CALC: 52 %PCV (ref 36–54)
HDLC SERPL-MCNC: 37 MG/DL (ref 35–60)
HGB BLD-MCNC: 16.6 G/DL (ref 12–16)
HGB BLD-MCNC: 18 G/DL
IMM GRANULOCYTES # BLD AUTO: 0.09 X10(3)/MCL (ref 0–0.04)
IMM GRANULOCYTES NFR BLD AUTO: 0.5 %
INR PPP: 1.1
LDLC SERPL CALC-MCNC: 88 MG/DL (ref 50–140)
LYMPHOCYTES # BLD AUTO: 0.98 X10(3)/MCL (ref 0.6–4.6)
LYMPHOCYTES NFR BLD AUTO: 5.6 %
MCH RBC QN AUTO: 31.6 PG (ref 27–31)
MCHC RBC AUTO-ENTMCNC: 35.4 G/DL (ref 33–36)
MCV RBC AUTO: 89.3 FL (ref 80–94)
MONOCYTES # BLD AUTO: 0.88 X10(3)/MCL (ref 0.1–1.3)
MONOCYTES NFR BLD AUTO: 5 %
NEUTROPHILS # BLD AUTO: 15.56 X10(3)/MCL (ref 2.1–9.2)
NEUTROPHILS NFR BLD AUTO: 88.2 %
NRBC BLD AUTO-RTO: 0 %
PLATELET # BLD AUTO: 204 X10(3)/MCL (ref 130–400)
PMV BLD AUTO: 11.4 FL (ref 7.4–10.4)
POC IONIZED CALCIUM: 1.25 MMOL/L (ref 1.06–1.42)
POC PTINR: 1 (ref 0.9–1.2)
POC PTWBT: 12.5 SEC (ref 9.7–14.3)
POC TCO2 (MEASURED): 24 MMOL/L (ref 23–29)
POTASSIUM BLD-SCNC: 4.7 MMOL/L (ref 3.5–5.1)
POTASSIUM SERPL-SCNC: 3.9 MMOL/L (ref 3.5–5.1)
PROT SERPL-MCNC: 6.8 GM/DL (ref 6.4–8.3)
PROTHROMBIN TIME: 14.3 SECONDS (ref 12.5–14.5)
RBC # BLD AUTO: 5.25 X10(6)/MCL (ref 4.2–5.4)
SAMPLE: ABNORMAL
SAMPLE: NORMAL
SODIUM BLD-SCNC: 140 MMOL/L (ref 136–145)
SODIUM SERPL-SCNC: 138 MMOL/L (ref 136–145)
TRIGL SERPL-MCNC: 64 MG/DL (ref 37–140)
TSH SERPL-ACNC: 0.76 UIU/ML (ref 0.35–4.94)
VLDLC SERPL CALC-MCNC: 13 MG/DL
WBC # SPEC AUTO: 17.63 X10(3)/MCL (ref 4.5–11.5)

## 2023-10-03 PROCEDURE — 80061 LIPID PANEL: CPT | Performed by: EMERGENCY MEDICINE

## 2023-10-03 PROCEDURE — 11000001 HC ACUTE MED/SURG PRIVATE ROOM

## 2023-10-03 PROCEDURE — 93010 EKG 12-LEAD: ICD-10-PCS | Mod: ,,, | Performed by: INTERNAL MEDICINE

## 2023-10-03 PROCEDURE — 85025 COMPLETE CBC W/AUTO DIFF WBC: CPT | Performed by: EMERGENCY MEDICINE

## 2023-10-03 PROCEDURE — 93010 ELECTROCARDIOGRAM REPORT: CPT | Mod: ,,, | Performed by: INTERNAL MEDICINE

## 2023-10-03 PROCEDURE — 85610 PROTHROMBIN TIME: CPT | Performed by: EMERGENCY MEDICINE

## 2023-10-03 PROCEDURE — 99285 EMERGENCY DEPT VISIT HI MDM: CPT | Mod: 25

## 2023-10-03 PROCEDURE — 96374 THER/PROPH/DIAG INJ IV PUSH: CPT

## 2023-10-03 PROCEDURE — 80053 COMPREHEN METABOLIC PANEL: CPT | Performed by: EMERGENCY MEDICINE

## 2023-10-03 PROCEDURE — 93005 ELECTROCARDIOGRAM TRACING: CPT

## 2023-10-03 PROCEDURE — 25000003 PHARM REV CODE 250: Performed by: EMERGENCY MEDICINE

## 2023-10-03 PROCEDURE — 86141 C-REACTIVE PROTEIN HS: CPT | Performed by: NURSE PRACTITIONER

## 2023-10-03 PROCEDURE — 84443 ASSAY THYROID STIM HORMONE: CPT | Performed by: EMERGENCY MEDICINE

## 2023-10-03 PROCEDURE — 63600175 PHARM REV CODE 636 W HCPCS: Performed by: EMERGENCY MEDICINE

## 2023-10-03 PROCEDURE — C9113 INJ PANTOPRAZOLE SODIUM, VIA: HCPCS | Performed by: EMERGENCY MEDICINE

## 2023-10-03 RX ORDER — NAPROXEN SODIUM 220 MG/1
324 TABLET, FILM COATED ORAL
Status: COMPLETED | OUTPATIENT
Start: 2023-10-03 | End: 2023-10-03

## 2023-10-03 RX ORDER — LABETALOL HYDROCHLORIDE 5 MG/ML
10 INJECTION, SOLUTION INTRAVENOUS EVERY 6 HOURS PRN
Status: DISCONTINUED | OUTPATIENT
Start: 2023-10-03 | End: 2023-10-05 | Stop reason: HOSPADM

## 2023-10-03 RX ORDER — PANTOPRAZOLE SODIUM 40 MG/10ML
40 INJECTION, POWDER, LYOPHILIZED, FOR SOLUTION INTRAVENOUS
Status: COMPLETED | OUTPATIENT
Start: 2023-10-03 | End: 2023-10-03

## 2023-10-03 RX ORDER — ASPIRIN 325 MG
325 TABLET, DELAYED RELEASE (ENTERIC COATED) ORAL DAILY
Status: DISCONTINUED | OUTPATIENT
Start: 2023-10-04 | End: 2023-10-05 | Stop reason: HOSPADM

## 2023-10-03 RX ADMIN — ASPIRIN 81 MG CHEWABLE TABLET 324 MG: 81 TABLET CHEWABLE at 06:10

## 2023-10-03 RX ADMIN — PANTOPRAZOLE SODIUM 40 MG: 40 INJECTION, POWDER, FOR SOLUTION INTRAVENOUS at 05:10

## 2023-10-03 NOTE — ED PROVIDER NOTES
Encounter Date: 10/3/2023    SCRIBE #1 NOTE: I, Citlalli Ferrari, am scribing for, and in the presence of,  Kam Orr MD. I have scribed the following portions of the note - Other sections scribed: HPI, ROS, PE.       History     Chief Complaint   Patient presents with    Cerebrovascular Accident     R sided facial droop beginning @ 1520. Hx of CVA @ 6 years old. . Equal  strength in upper and lower ext.      23 year old female with history of AVM, liver disease, JUDITH, and hypoplastic left heart syndrome presents to the ED via EMS for stroke-like symptoms. Pt reports that she was at work when her coworker noticed approximately 30 minutes ago that she had a left facial droop. EMS notes that she was having some slurred speech at first and that her o2 SATs were 89-90%, which is normal for her. Pt notes that she is on baby aspirin and Lasix daily.     The history is provided by the patient and the EMS personnel. No  was used.     Review of patient's allergies indicates:   Allergen Reactions    Adhesive Dermatitis     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    Chylothorax     Congenital absence of pulmonary artery     to [place]    Dyspnea     Encounter for blood transfusion     Fracture of wrist     x4    General anesthetics causing adverse effect in therapeutic use     was mean as a child when waking up after tonsillectomy    Hypoplastic left heart     Liver disease     enlarged liver    Obstructive sleep apnea     resolved by T&A    Obstructive sleep apnea (adult) (pediatric)     Post-Fontan protein-losing enteropathy     Stroke     mother states possibly had stroke at 4 y/o    Tonsillar and adenoid hypertrophy      Past Surgical History:   Procedure Laterality Date    BIDIRECTIONAL JOSE W/ ATRIAL SEPTECTOMY      [place] [facility]    CARDIAC SURGERY      CATHETER REFENESTRATION  1/11/2005     [facility]     COARCTATION STENT  3/9/11    COLONOSCOPY N/A 5/27/2021    Procedure: COLONOSCOPY;  Surgeon: Benigno Bowers MD;  Location: [facility] ENDO (2ND FLR);  Service: Endoscopy;  Laterality: N/A;    COMBINED RIGHT AND RETROGRADE LEFT HEART CATHETERIZATION FOR CONGENITAL HEART DEFECT N/A 3/7/2019    Procedure: CATHETERIZATION, HEART, COMBINED RIGHT AND RETROGRADE LEFT, FOR CONGENITAL HEART DEFECT;  Surgeon: Jimi Pollard Jr., MD;  Location: [facility] CATH LAB;  Service: Cardiology;  Laterality: N/A;  ped heart    ESOPHAGOGASTRODUODENOSCOPY N/A 5/27/2021    Procedure: EGD (ESOPHAGOGASTRODUODENOSCOPY);  Surgeon: Benigno Bowers MD;  Location: [facility] ENDO (2ND FLR);  Service: Endoscopy;  Laterality: N/A;  Patient will need pediatric heart anesthesiologist due to history of Fontan     mother states having formed stool         COVID test at [facility] on 5/24-GT    FONTAN PROCEDURE, EXTRACARDIAC  9/27/2004    [facility]    LPA     RE CONSTRUCTION      [facility]    LPA STENT  2/26/.2009    GLENIS    nati/ mitzi  age 3 days     done at [facility]    TONSILLECTOMY, ADENOIDECTOMY  11/2011    WISDOM TOOTH EXTRACTION       Family History   Problem Relation Age of Onset    No Known Problems Father     Urologic Abnormality Other     Thyroid disease Mother     No Known Problems Maternal Grandmother     Hypertension Maternal Grandfather     Skin cancer Maternal Grandfather     Hypertension Paternal Grandmother     Glaucoma Paternal Grandmother     No Known Problems Sister     No Known Problems Brother     No Known Problems Maternal Aunt     No Known Problems Maternal Uncle     No Known Problems Paternal Aunt     No Known Problems Paternal Uncle     No Known Problems Paternal Grandfather     Cancer Other 80        colon    Heart disease Neg Hx     Diabetes Neg Hx     Retinal detachment Neg Hx     Amblyopia Neg Hx     Blindness Neg Hx     Strabismus Neg Hx     Macular degeneration Neg Hx     Stroke Neg Hx     Breast cancer Neg Hx     Ovarian cancer Neg  Hx     Esophageal cancer Neg Hx      Social History     Tobacco Use    Smoking status: Every Day     Types: Vaping with nicotine    Smokeless tobacco: Never    Tobacco comments:     No TAMMY   Substance Use Topics    Alcohol use: Yes     Comment: occassional    Drug use: No     Review of Systems   Constitutional:  Negative for chills and fever.   Respiratory:  Negative for cough and shortness of breath.    Cardiovascular:  Negative for chest pain.   Gastrointestinal:  Negative for abdominal pain, nausea and vomiting.   Musculoskeletal:  Negative for myalgias.   Neurological:  Positive for speech difficulty. Negative for syncope and headaches.        Facial droop   All other systems reviewed and are negative.      Physical Exam     Initial Vitals [10/03/23 1601]   BP Pulse Resp Temp SpO2   (!) 157/76 (!) 20 16 97.7 °F (36.5 °C) (!) 90 %      MAP       --         Physical Exam    Nursing note and vitals reviewed.  Constitutional: She appears well-developed and well-nourished. No distress.   HENT:   Head: Normocephalic and atraumatic.   Eyes: Conjunctivae are normal. Pupils are equal, round, and reactive to light.   Cardiovascular:  Normal rate and intact distal pulses.           Pulmonary/Chest: No respiratory distress. She has no rhonchi.   Abdominal: Abdomen is soft. Bowel sounds are normal. There is no abdominal tenderness. There is no rebound and no guarding.   Musculoskeletal:         General: No edema.     Neurological: She is alert. She has normal strength.   No facial asymmetry at rest. Left side of face does not lift when smiles. Normal speech. No pronator drift. No leg drift.    Skin: Skin is warm and dry.   Psychiatric: She has a normal mood and affect.         ED Course   Procedures  Labs Reviewed   CBC WITH DIFFERENTIAL - Abnormal; Notable for the following components:       Result Value    WBC 17.63 (*)     Hgb 16.6 (*)     MCH 31.6 (*)     MPV 11.4 (*)     Neut # 15.56 (*)     IG# 0.09 (*)     All other  components within normal limits   ISTAT CHEM8 - Abnormal; Notable for the following components:    POC Glucose 114 (*)     POC Anion Gap 17 (*)     All other components within normal limits   TSH - Normal   CBC W/ AUTO DIFFERENTIAL    Narrative:     The following orders were created for panel order CBC W/ AUTO DIFFERENTIAL.  Procedure                               Abnormality         Status                     ---------                               -----------         ------                     CBC with Differential[[id number]]       Abnormal            Final result                 Please view results for these tests on the individual orders.   LIPID PANEL   COMPREHENSIVE METABOLIC PANEL   PROTIME-INR   POCT GLUCOSE, HAND-HELD DEVICE   ISTAT PROCEDURE        ECG Results              ECG 12 lead (Final result)  Result time 10/03/23 17:48:05      Final result by Interface, Lab In [facility] (10/03/23 17:48:05)                   Narrative:    Test Reason : I63.9,    Vent. Rate : 089 BPM     Atrial Rate : 089 BPM     P-R Int : 176 ms          QRS Dur : 120 ms      QT Int : 390 ms       P-R-T Axes : -07 102 090 degrees     QTc Int : 474 ms    Normal sinus rhythm  Right bundle branch block  Abnormal ECG  Confirmed by Arthur Soriano MD ([id number]) on 10/3/2023 5:47:45 PM    Referred By: [email or web address]   SELF           Confirmed By:Arthur Soriano MD                      Wet Read by Kam Orr MD (10/03/23 16:33:40, [facility] - Emergency Dept, Emergency Medicine)    EKG at 1625.  89 beats per minute right bundle-branch block sinus rhythm                                  Imaging Results              CTA STROKE MULTI-PHASE (Final result)  Result time 10/03/23 16:51:11      Final result by Ly Mendoza MD (10/03/23 16:51:11)                   Impression:      1. No large vessel occlusion.  2. Suspected focal stenosis of a right M2 branch.      Electronically signed by: Ly  Reji  Date:    10/03/2023  Time:    16:51               Narrative:    EXAMINATION:  CTA STROKE MULTI-PHASE    CLINICAL HISTORY:  Neuro deficit, acute, stroke suspected;Left facial droop.;    TECHNIQUE:  Axial images obtained through the cervical region and [place] before and after the administration of intravenous contrast.    Coronal, sagittal, MIP and 3D reconstructions were obtained from the axial data.    Automatic exposure control was utilized to limit radiation dose.    Radiation Dose:    Total DLP: 1764 mGy*cm    COMPARISON:  CT head from the same day    FINDINGS:  Head CT with contrast:    No interval changes when compared to the previous CT.    No enhancing abnormalities.    If present, stenosis of the carotid bulbs is measured based on NASCET criteria,    i.e. area of maximal stenosis compared to the cervical ICA distal to the bulb.    Cervical CTA:    The origins of the great vessels are patent.    The common carotid arteries, carotid bulbs and internal carotid arteries are patent.    The vertebral arteries are patent.    Intracranial CTA:    The internal carotid arteries are patent.  There is suspected focal stenosis of a right M2 branch (series 14, images 146-147) otherwise the left middle cerebral arteries and bilateral anterior cerebral arteries are patent.    The vertebral arteries, basilar artery and posterior cerebral arteries are patent.    The dural venous sinuses are patent.                                       CT HEAD FOR STROKE (Final result)  Result time 10/03/23 16:21:33   Procedure changed from CT Head Without Contrast     Final result by Ly Mendoza MD (10/03/23 16:21:33)                   Impression:      No acute intracranial abnormality.    Code fast findings given to Dr. Orr at the time of dictation.      Electronically signed by: Ly Mendoza  Date:    10/03/2023  Time:    16:21               Narrative:    EXAMINATION:  CT HEAD FOR STROKE    CLINICAL  HISTORY:  Neuro deficit, acute, stroke suspected;left facial droop.;    TECHNIQUE:  Axial scans were obtained from skull base to the vertex.    Coronal and sagittal reconstructions obtained from the axial data.    Automatic exposure control was utilized to limit radiation dose.    Contrast: None    Radiation Dose:    Total DLP: 1229 mGy*cm    COMPARISON:  None    FINDINGS:  There is no acute intracranial hemorrhage or edema. The gray-white matter differentiation is preserved.    There is no mass effect or midline shift. The ventricles and sulci are normal in size. The basal cisterns are patent. There is no abnormal extra-axial fluid collection.    The calvarium and skull base are intact. The visualized paranasal sinuses and the mastoid air cells are clear.                                       Medications   aspirin chewable tablet 324 mg (has no administration in time range)   pantoprazole injection 40 mg (40 mg Intravenous Given 10/3/23 1700)     Medical Decision Making  Differential diagnosis includes but is not limited to CVA, seizure, intracranial hemorrhage       Amount and/or Complexity of Data Reviewed  Independent Historian: EMS     Details: EMS notes that she was having some slurred speech at first and that her o2 SATs was 89-90%, which is normal for her  External Data Reviewed: notes.  Labs: ordered.  Radiology: ordered.    Risk  OTC drugs.  Prescription drug management.  Decision regarding hospitalization.      Additional MDM:     NIH Stroke Scale:   Interval = baseline (upon arrival/admit)  Level of consciousness = 0 - alert  LOC questions = 0 - answers both correctly  LOC commands = 0 - performs both correctly  Best gaze = 0 - normal  Visual = 0 - no visual loss  Facial palsy = 1 - minor  Motor left arm =  0 - no drift  Motor right arm =  0 - no drift  Motor left leg = 0 - no drift  Motor right leg =  0 - no drift  Limb ataxia = 0 - absent  Sensory = 0 - normal  Best language = 0 - no  aphasia  Dysarthria = 0 - normal articulation  Extinction and inattention = 0 - no neglect  NIH Stroke Scale Total = 1             Attending Attestation:           Physician Attestation for Scribe:  Physician Attestation Statement for Scribe #1: I, Kam Orr MD, reviewed documentation, as scribed by Citlalli Ferrari in my presence, and it is both accurate and complete.             ED Course as of 10/03/23 1840   Tue Oct 03, 2023   1601 23-year-old female states she had a stroke when she was young denies seizure history has a history of hypoplastic left heart syndrome status post fenestrated Fontan with stented aortic coarctation and LPA shunt no records.  Patient states she was sitting with a friend 30 minutes prior to arrival in her friend noticed that the left side of her face was not moving correctly so they called EMS for evaluation.  On exam she is an NIH of 1.  At rest her face is symmetric when asked to smile left lower face does not elevate.  Cranial nerves are otherwise intact speech is normal no arm drift no leg drift normal sensation   [LF]   1604 Discussed case with Dr. Ledezma he agrees no thrombolytics [LF]   1634 Patient's EKG is unchanged from previous on record from 07/17/2023 [LF]   1701 Dr Mendoza called reports some stenosis of R M2. Discussed with Dr Ledezma again, recs discuss with Dr Medrano of interventional neurology [LF]   1705 Facial droop has resolved now.  0.  She is already on aspirin at home.   [LF]   1833  Think he was called we discussed the case.  He recommends we admit her for further stroke evaluation and recommends 324 of aspirin now [LF]      ED Course User Index  [LF] Kam Orr MD                      Clinical Impression:   Final diagnoses:  [I63.9] Stroke  [G45.9] TIA (transient ischemic attack) (Primary)  [Q23.4] Hypoplastic left heart        ED Disposition Condition    Admit Stable                Kam Orr MD  10/03/23 4770

## 2023-10-04 LAB
ALBUMIN SERPL-MCNC: 4.6 G/DL (ref 3.5–5)
ALBUMIN/GLOB SERPL: 1.8 RATIO (ref 1.1–2)
ALP SERPL-CCNC: 69 UNIT/L (ref 40–150)
ALT SERPL-CCNC: 37 UNIT/L (ref 0–55)
APTT PPP: 27.2 SECONDS (ref 23.2–33.7)
AST SERPL-CCNC: 24 UNIT/L (ref 5–34)
BASOPHILS # BLD AUTO: 0.05 X10(3)/MCL
BASOPHILS NFR BLD AUTO: 0.4 %
BILIRUB SERPL-MCNC: 0.9 MG/DL
BSA FOR ECHO PROCEDURE: 2.06 M2
BUN SERPL-MCNC: 14.7 MG/DL (ref 7–18.7)
CALCIUM SERPL-MCNC: 9.7 MG/DL (ref 8.4–10.2)
CHLORIDE SERPL-SCNC: 111 MMOL/L (ref 98–107)
CK MB SERPL-MCNC: <1 NG/ML
CO2 SERPL-SCNC: 20 MMOL/L (ref 22–29)
CREAT SERPL-MCNC: 0.71 MG/DL (ref 0.55–1.02)
EOSINOPHIL # BLD AUTO: 0.15 X10(3)/MCL (ref 0–0.9)
EOSINOPHIL NFR BLD AUTO: 1.2 %
ERYTHROCYTE [DISTWIDTH] IN BLOOD BY AUTOMATED COUNT: 12.7 % (ref 11.5–17)
EST. AVERAGE GLUCOSE BLD GHB EST-MCNC: 114 MG/DL
GFR SERPLBLD CREATININE-BSD FMLA CKD-EPI: >60 MLS/MIN/1.73/M2
GLOBULIN SER-MCNC: 2.5 GM/DL (ref 2.4–3.5)
GLUCOSE SERPL-MCNC: 80 MG/DL (ref 74–100)
HBA1C MFR BLD: 5.6 %
HCT VFR BLD AUTO: 49.2 % (ref 37–47)
HGB BLD-MCNC: 17.3 G/DL (ref 12–16)
IMM GRANULOCYTES # BLD AUTO: 0.03 X10(3)/MCL (ref 0–0.04)
IMM GRANULOCYTES NFR BLD AUTO: 0.2 %
INR PPP: 1.1
LYMPHOCYTES # BLD AUTO: 1.39 X10(3)/MCL (ref 0.6–4.6)
LYMPHOCYTES NFR BLD AUTO: 11.2 %
MAGNESIUM SERPL-MCNC: 1.9 MG/DL (ref 1.6–2.6)
MCH RBC QN AUTO: 31.7 PG (ref 27–31)
MCHC RBC AUTO-ENTMCNC: 35.2 G/DL (ref 33–36)
MCV RBC AUTO: 90.1 FL (ref 80–94)
MONOCYTES # BLD AUTO: 0.91 X10(3)/MCL (ref 0.1–1.3)
MONOCYTES NFR BLD AUTO: 7.3 %
NEUTROPHILS # BLD AUTO: 9.89 X10(3)/MCL (ref 2.1–9.2)
NEUTROPHILS NFR BLD AUTO: 79.7 %
NRBC BLD AUTO-RTO: 0 %
PHOSPHATE SERPL-MCNC: 3 MG/DL (ref 2.3–4.7)
PLATELET # BLD AUTO: 198 X10(3)/MCL (ref 130–400)
PMV BLD AUTO: 10.9 FL (ref 7.4–10.4)
POTASSIUM SERPL-SCNC: 3.9 MMOL/L (ref 3.5–5.1)
PROT SERPL-MCNC: 7.1 GM/DL (ref 6.4–8.3)
PROTHROMBIN TIME: 13.7 SECONDS (ref 12.5–14.5)
RBC # BLD AUTO: 5.46 X10(6)/MCL (ref 4.2–5.4)
SODIUM SERPL-SCNC: 139 MMOL/L (ref 136–145)
TROPONIN I SERPL-MCNC: <0.01 NG/ML (ref 0–0.04)
WBC # SPEC AUTO: 12.42 X10(3)/MCL (ref 4.5–11.5)

## 2023-10-04 PROCEDURE — 36227 PLACE CATH XTRNL CAROTID: CPT | Mod: 50,,, | Performed by: PSYCHIATRY & NEUROLOGY

## 2023-10-04 PROCEDURE — 36225 PR ANGIO VERTEBRAL ARTERY +/- CERVIOCEREBRAL ARCH, SUBCLAVIAN ART, SELECTV CATH,S&I: ICD-10-PCS | Mod: 59,LT,, | Performed by: PSYCHIATRY & NEUROLOGY

## 2023-10-04 PROCEDURE — 85610 PROTHROMBIN TIME: CPT | Performed by: INTERNAL MEDICINE

## 2023-10-04 PROCEDURE — 83036 HEMOGLOBIN GLYCOSYLATED A1C: CPT | Performed by: INTERNAL MEDICINE

## 2023-10-04 PROCEDURE — 36224 PLACE CATH CAROTD ART: CPT | Mod: 50,,, | Performed by: PSYCHIATRY & NEUROLOGY

## 2023-10-04 PROCEDURE — 36226 PR ANGIO VERTEBRAL ARTERY +/- CERVIOCEREBRAL ARCH, VERTEBRAL ART, SELECTV CATH,S&I: ICD-10-PCS | Mod: 51,RT,, | Performed by: PSYCHIATRY & NEUROLOGY

## 2023-10-04 PROCEDURE — 80053 COMPREHEN METABOLIC PANEL: CPT | Performed by: INTERNAL MEDICINE

## 2023-10-04 PROCEDURE — 25500020 PHARM REV CODE 255: Performed by: INTERNAL MEDICINE

## 2023-10-04 PROCEDURE — 83735 ASSAY OF MAGNESIUM: CPT | Performed by: INTERNAL MEDICINE

## 2023-10-04 PROCEDURE — 84484 ASSAY OF TROPONIN QUANT: CPT | Performed by: INTERNAL MEDICINE

## 2023-10-04 PROCEDURE — 25000003 PHARM REV CODE 250: Performed by: PSYCHIATRY & NEUROLOGY

## 2023-10-04 PROCEDURE — A9577 INJ MULTIHANCE: HCPCS | Performed by: INTERNAL MEDICINE

## 2023-10-04 PROCEDURE — 99152 MOD SED SAME PHYS/QHP 5/>YRS: CPT | Mod: ,,, | Performed by: PSYCHIATRY & NEUROLOGY

## 2023-10-04 PROCEDURE — 36226 PLACE CATH VERTEBRAL ART: CPT | Mod: 51,RT,, | Performed by: PSYCHIATRY & NEUROLOGY

## 2023-10-04 PROCEDURE — 25000003 PHARM REV CODE 250: Performed by: INTERNAL MEDICINE

## 2023-10-04 PROCEDURE — 27000221 HC OXYGEN, UP TO 24 HOURS

## 2023-10-04 PROCEDURE — 85730 THROMBOPLASTIN TIME PARTIAL: CPT | Performed by: INTERNAL MEDICINE

## 2023-10-04 PROCEDURE — 99152 PR MOD CONSCIOUS SEDATION, SAME PHYS, 5+ YRS, FIRST 15 MIN: ICD-10-PCS | Mod: ,,, | Performed by: PSYCHIATRY & NEUROLOGY

## 2023-10-04 PROCEDURE — 36225 PLACE CATH SUBCLAVIAN ART: CPT | Mod: 59,LT,, | Performed by: PSYCHIATRY & NEUROLOGY

## 2023-10-04 PROCEDURE — 92523 SPEECH SOUND LANG COMPREHEN: CPT

## 2023-10-04 PROCEDURE — 82553 CREATINE MB FRACTION: CPT | Performed by: INTERNAL MEDICINE

## 2023-10-04 PROCEDURE — 11000001 HC ACUTE MED/SURG PRIVATE ROOM

## 2023-10-04 PROCEDURE — 84100 ASSAY OF PHOSPHORUS: CPT | Performed by: INTERNAL MEDICINE

## 2023-10-04 PROCEDURE — 63600175 PHARM REV CODE 636 W HCPCS: Performed by: PSYCHIATRY & NEUROLOGY

## 2023-10-04 PROCEDURE — 85025 COMPLETE CBC W/AUTO DIFF WBC: CPT | Performed by: INTERNAL MEDICINE

## 2023-10-04 PROCEDURE — 99222 PR INITIAL HOSPITAL CARE,LEVL II: ICD-10-PCS | Mod: 25,,, | Performed by: PSYCHIATRY & NEUROLOGY

## 2023-10-04 PROCEDURE — 36224 PR ANGIO INTRCRNL ART +/- CERVIOCEREBRAL ARCH, INTRNL CAROTID ART, SELECTV CATH ,S&I: ICD-10-PCS | Mod: 50,,, | Performed by: PSYCHIATRY & NEUROLOGY

## 2023-10-04 PROCEDURE — 99222 1ST HOSP IP/OBS MODERATE 55: CPT | Mod: 25,,, | Performed by: PSYCHIATRY & NEUROLOGY

## 2023-10-04 PROCEDURE — 36227 PR ANGIO XTRNL CAROTD CIRC, XTRNL CAROTID, SELECTV CATH S&I: ICD-10-PCS | Mod: 50,,, | Performed by: PSYCHIATRY & NEUROLOGY

## 2023-10-04 RX ORDER — VERAPAMIL HYDROCHLORIDE 2.5 MG/ML
INJECTION, SOLUTION INTRAVENOUS
Status: COMPLETED | OUTPATIENT
Start: 2023-10-04 | End: 2023-10-04

## 2023-10-04 RX ORDER — DIGOXIN 125 MCG
0.12 TABLET ORAL NIGHTLY
Status: DISCONTINUED | OUTPATIENT
Start: 2023-10-04 | End: 2023-10-05 | Stop reason: HOSPADM

## 2023-10-04 RX ORDER — FENTANYL CITRATE 50 UG/ML
INJECTION, SOLUTION INTRAMUSCULAR; INTRAVENOUS
Status: COMPLETED | OUTPATIENT
Start: 2023-10-04 | End: 2023-10-04

## 2023-10-04 RX ORDER — SILDENAFIL CITRATE 20 MG/1
20 TABLET ORAL 2 TIMES DAILY
Status: DISCONTINUED | OUTPATIENT
Start: 2023-10-04 | End: 2023-10-05 | Stop reason: HOSPADM

## 2023-10-04 RX ORDER — MIDAZOLAM HYDROCHLORIDE 1 MG/ML
INJECTION INTRAMUSCULAR; INTRAVENOUS
Status: COMPLETED | OUTPATIENT
Start: 2023-10-04 | End: 2023-10-04

## 2023-10-04 RX ORDER — SODIUM CHLORIDE 9 MG/ML
INJECTION, SOLUTION INTRAVENOUS CONTINUOUS
Status: DISCONTINUED | OUTPATIENT
Start: 2023-10-04 | End: 2023-10-05 | Stop reason: HOSPADM

## 2023-10-04 RX ORDER — SPIRONOLACTONE 25 MG/1
25 TABLET ORAL 2 TIMES DAILY
Status: DISCONTINUED | OUTPATIENT
Start: 2023-10-04 | End: 2023-10-05 | Stop reason: HOSPADM

## 2023-10-04 RX ORDER — LIDOCAINE HYDROCHLORIDE 20 MG/ML
INJECTION, SOLUTION INFILTRATION; PERINEURAL
Status: COMPLETED | OUTPATIENT
Start: 2023-10-04 | End: 2023-10-04

## 2023-10-04 RX ORDER — FUROSEMIDE 20 MG/1
20 TABLET ORAL 2 TIMES DAILY
Status: DISCONTINUED | OUTPATIENT
Start: 2023-10-04 | End: 2023-10-05 | Stop reason: HOSPADM

## 2023-10-04 RX ORDER — HEPARIN SODIUM 1000 [USP'U]/ML
INJECTION, SOLUTION INTRAVENOUS; SUBCUTANEOUS
Status: COMPLETED | OUTPATIENT
Start: 2023-10-04 | End: 2023-10-04

## 2023-10-04 RX ORDER — NITROGLYCERIN 5 MG/ML
INJECTION, SOLUTION INTRAVENOUS
Status: COMPLETED | OUTPATIENT
Start: 2023-10-04 | End: 2023-10-04

## 2023-10-04 RX ORDER — SERTRALINE HYDROCHLORIDE 50 MG/1
50 TABLET, FILM COATED ORAL DAILY
Status: DISCONTINUED | OUTPATIENT
Start: 2023-10-04 | End: 2023-10-05 | Stop reason: HOSPADM

## 2023-10-04 RX ORDER — ACETAMINOPHEN 325 MG/1
650 TABLET ORAL EVERY 6 HOURS PRN
Status: DISCONTINUED | OUTPATIENT
Start: 2023-10-04 | End: 2023-10-05 | Stop reason: HOSPADM

## 2023-10-04 RX ADMIN — SILDENAFIL 20 MG: 20 TABLET ORAL at 09:10

## 2023-10-04 RX ADMIN — SODIUM CHLORIDE: 9 INJECTION, SOLUTION INTRAVENOUS at 06:10

## 2023-10-04 RX ADMIN — HEPARIN SODIUM 3500 UNITS: 1000 INJECTION, SOLUTION INTRAVENOUS; SUBCUTANEOUS at 02:10

## 2023-10-04 RX ADMIN — NITROGLYCERIN 200 MCG: 5 INJECTION, SOLUTION INTRAVENOUS at 02:10

## 2023-10-04 RX ADMIN — ACETAMINOPHEN 650 MG: 325 TABLET, FILM COATED ORAL at 06:10

## 2023-10-04 RX ADMIN — DIGOXIN 0.12 MG: 125 TABLET ORAL at 08:10

## 2023-10-04 RX ADMIN — VERAPAMIL HYDROCHLORIDE 2.5 MG: 2.5 INJECTION, SOLUTION INTRAVENOUS at 02:10

## 2023-10-04 RX ADMIN — SODIUM CHLORIDE: 9 INJECTION, SOLUTION INTRAVENOUS at 08:10

## 2023-10-04 RX ADMIN — FENTANYL CITRATE 50 MCG: 50 INJECTION, SOLUTION INTRAMUSCULAR; INTRAVENOUS at 02:10

## 2023-10-04 RX ADMIN — LIDOCAINE HYDROCHLORIDE 5 ML: 20 INJECTION, SOLUTION INFILTRATION; PERINEURAL at 02:10

## 2023-10-04 RX ADMIN — SPIRONOLACTONE 25 MG: 25 TABLET ORAL at 09:10

## 2023-10-04 RX ADMIN — FUROSEMIDE 20 MG: 20 TABLET ORAL at 09:10

## 2023-10-04 RX ADMIN — GADOBENATE DIMEGLUMINE 17 ML: 529 INJECTION, SOLUTION INTRAVENOUS at 11:10

## 2023-10-04 RX ADMIN — MIDAZOLAM HYDROCHLORIDE 1 MG: 1 INJECTION, SOLUTION INTRAMUSCULAR; INTRAVENOUS at 02:10

## 2023-10-04 NOTE — PT/OT/SLP EVAL
[facility]  Speech Language Pathology Department  Cognitive-Communication Evaluation    Patient Name:  Jil Lennon   MRN:  [id number]    Recommendations:     General recommendations:  SLP intervention not indicated  Communication strategies:  go to room if call light pushed    Discharge recommendations:  Discharge Facility/Level of Care Needs: home   Barriers to safe discharge: acuity of illness    History:     Jil Lennon is a/n 23 y.o. female admitted for CVA workup.  Passed [facility] swallow screen.    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    Chylothorax     Congenital absence of pulmonary artery     to [place]    Dyspnea     Encounter for blood transfusion     Fracture of wrist     x4    General anesthetics causing adverse effect in therapeutic use     was mean as a child when waking up after tonsillectomy    Hypoplastic left heart     Liver disease     enlarged liver    Obstructive sleep apnea     resolved by T&A    Obstructive sleep apnea (adult) (pediatric)     Post-Fontan protein-losing enteropathy     Stroke     mother states possibly had stroke at 4 y/o    Tonsillar and adenoid hypertrophy      Past Surgical History:   Procedure Laterality Date    BIDIRECTIONAL JOSE W/ ATRIAL SEPTECTOMY      [facility]    CARDIAC SURGERY      CATHETER REFENESTRATION  1/11/2005     [facility]    COARCTATION STENT  3/9/11    COLONOSCOPY N/A 5/27/2021    Procedure: COLONOSCOPY;  Surgeon: Benigno Bowers MD;  Location: [facility] ENDO ([address] [place] [address]);  Service: Endoscopy;  Laterality: N/A;    COMBINED RIGHT AND RETROGRADE LEFT HEART CATHETERIZATION FOR CONGENITAL HEART DEFECT N/A 3/7/2019    Procedure: CATHETERIZATION, HEART, COMBINED RIGHT AND RETROGRADE LEFT, FOR CONGENITAL HEART DEFECT;  Surgeon: Jimi Pollard Jr., MD;  Location: [facility] CATH LAB;  Service: Cardiology;  Laterality: N/A;  ped heart     ESOPHAGOGASTRODUODENOSCOPY N/A 5/27/2021    Procedure: EGD (ESOPHAGOGASTRODUODENOSCOPY);  Surgeon: Benigno Bowers MD;  Location: [facility] ([address] [place] [address]);  Service: Endoscopy;  Laterality: N/A;  Patient will need pediatric heart anesthesiologist due to history of Fontan     mother states having formed stool         COVID test at [facility] on 5/24-GT    FONTAN PROCEDURE, EXTRACARDIAC  9/27/2004    [facility]    LPA     RE CONSTRUCTION      [facility]    LPA STENT  2/26/.2009    [facility]    [place]/ mitzi  age 3 days     done at [facility]    TONSILLECTOMY, ADENOIDECTOMY  11/2011    WISDOM TOOTH EXTRACTION         Previous level of Function  Education: high school, 16 months school to be a   Lives:  with parents  Handed: Ambidextrous  Glasses: yes  Hearing Aids: no  Home Responsibilities:  independent    Imaging   Results for orders placed during the hospital encounter of 10/03/23    MRI Brain Without Contrast    Narrative  EXAMINATION:  MRI BRAIN WITHOUT CONTRAST    CLINICAL HISTORY:  Stroke, follow up;    TECHNIQUE:  Multiplanar multisequence MR imaging of the brain was performed without contrast.    COMPARISON:  CT of the head 10/03/2023.    FINDINGS:  There is normal brain formation.  There is normal gray-white matter differentiation.  There is no restricted diffusion.  There is no hemorrhage, hydrocephalus, or midline shift.  There is no intra or extra-axial fluid collection.  There is very minimal periventricular FLAIR hyperintensity.  This measures 7 mm.  There is no susceptibility artifact.  There are normal flow voids in the bilateral carotid siphons.  The sella is normal.  The calvarium is normal in signal intensity.  The bilateral orbits are normal.  The paranasal sinuses are free of disease.    Impression  Small focus of 7 mm area FLAIR hyperintensity in the right periventricular white matter without diffusion restriction.  This is nonspecific.  This can be seen in the setting of chronic small vessel  ischemic disease versus demyelinating disease.      Electronically signed by: Vasu Blunt MD  Date:    10/03/2023  Time:    20:58    Subjective     Patient awake, alert, calm, and cooperative.    Patient goals: to get better     Spiritual/Cultural/Jainism Beliefs/Practices that affect care: no  Pain/Comfort: Pain Rating 1: 0/10  Respiratory Status: room air    Objective:     ORAL MUSCULATURE  Dentition: own teeth  Facial Movement: WFL  Buccal Strength & Mobility: WFL  Mandibular Strength & Mobility: WFL  Oral Labial Strength & Mobility: WFL  Lingual Strength & Mobility: WFL  Velar Elevation: WFL    SPEECH PRODUCTION  Phoneme Production: adequate  Voice Quality: adequate  Voice Production: adequate  Speech Rate: appropriate  Loudness: acceptable  Respiration: WFL for speech  Resonance: adequate  Prosody: adequate  Speech Intelligibility  Known Context: Greater that 90%  Unknown Context: Greater that 90%    AUDITORY COMPREHENSION  Following Directions:  1-Step: 100  2-Step: 100  Yes/No Questions:  Biographical: 100  Environmental: 100  Simple: 100  Complex: 100    VERBAL EXPRESSION  Automatic Speech:  Days of the week: WFL  Counting: [facility]    Confrontation Naming  Objects: 100  Wh- Questions:  Object name: [facility]  Object function: WFL    COGNITION  Orientation:  Person: yes  Place: yes  Time: yes  Situation: yes   Attention:  Focused: WFL  Sustained: WFL  Selective: WFL  Pragmatics:  Eye contact: [facility]  Personal space: [facility]  Facial expression: WFL  Communicative Intent: WFL  Memory:  Immediate: WFL  Short Term: 11/12 on Four Word Memory Task  Long Term: [facility]  Problem Solving  Functional simple: WFL  Functional complex: WFL  Money Management: [facility]  Organization:  Convergent thinking: [facility]  Divergent thinking: [facility]  Executive Function:  Attention to detail: WFL  Awareness: [facility]  Cognitive endurance: WFL  Information processing: [facility]    Assessment:     Pt presents with speech, language, and cognitive abilities [facility].  Pt and parents  endorsing no difficulty.  Skilled SLP services not warranted, please reconsult as needed.    Goals:     Multidisciplinary Problems       SLP Goals       Not on file                  Patient Education:     Patient and family were provided with verbal education regarding results.  Understanding was verbalized.    Plan:     SLP Follow-Up:  No   Patient to be seen:      Plan of Care expires:     Plan of Care reviewed with:  patient, mother, father      Time Tracking:     SLP Treatment Date:   10/04/23  Speech Start Time:  1130  Speech Stop Time:  1140     Speech Total Time (min):  10 min    Billable minutes:  Evaluation of Speech Sound Production with Comprehension and Expression, 10 minutes     10/04/2023

## 2023-10-04 NOTE — NURSING
AM LABS NOT COLLECTED. DISCUSSED WITH LAB AT 1145. STATES IT WAS ORDERED UNIT COLLECT. THIS IS A DEFAULT IN SYSTEM. REORDERED ALL LABS STAT FOR LAB COLLECT.   1300- PER LAB THIS STILL STATES THAT IS FOR UNIT COLLECT. I I INFORMED HER THAT UNDER ORDERS IT DOES NOT HAVE A TAB TO CONFIRM UNIT VS LAB COLLECT. INFORMED HER THAT THIS WAS MODIFIED AND PHLEBOTOMIST WAS ALSO INFORMED. SHE INFORMED ME THAT SHE WOULD SEND A PHLEB ASAP.

## 2023-10-04 NOTE — HPI
23 year old female with history of AVM, liver disease, JUDITH, and 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 She reported she was at work, had a normal day, at 1500 she had sudden onset facial numbness, she felt like her face was drooping, she talked to a co-worker who reported her face was drooping, she face timed her mom who told her to call EMS.  Upon arrival to ED, a stroke alert was called. NIH: 1, /76, discussed with neurology on call, no recommendations for TNK, symptoms resolved shortly after her arrival to ED. CT head was unremarkable. CTA head and neck was negative for LVO but did show right MCA M2 stenosis. Neurology was consulted for stroke workup.       Mother at bedside, reports patient had heart surgery when she was 4 years old. Since then, she has had 4 heart surgeries. Mother states she suspected the patient had a stroke, when she was 6 she had vertigo and was taken to the doctor who told them she had some scarring on the brain. When mother reviewed records, there was mention of a stroke but it seems like the patient did not have clinical symptoms from this. She is on aspirin daily. She does vape.

## 2023-10-04 NOTE — ED NOTES
10/03/23 2634   [facility] Pre-Screening   Is patient able to be positioned upright with some head control?   1 Yes   [facility] instructions Perform oral hygiene prior to [facility] screening   [facility] Bedside Swallowing Screen   1. Is Patient Alert? (can follow commands) 1 Yes   [facility] instructions Continue screen   2a. Dysarthria (speech slurred or garbled) 2 No   2b. Aphasia (trouble speaking or understanding words)  2 No   Speech instructions Continue screen   3a. Able to clench teeth 1 Yes   3b. Able to close lips 1 Yes   3c. Face is symmetrical with movement 1 Yes   3d. Tongue is midline 1 Yes   3e. Uvula is midline 1 Yes   Motor facial strength instructions Continue screen   4a. Gag reflex is present 1 Yes   4b. Has voluntary cough (have pt. cough 2 times) 1 Yes   4c. Able to swallow own secretions (no drooling) 1 Yes   4d. Swallow reflex is present 1 Yes   Gag reflex instructions Continue screen   TSP (calculated) 4   [facility] pass/fail Pass (should be able to swallow diet safely)   5. Give a teaspoon (5ml) of water   5a. Choked with swallowing 2 No   5b. Voice sounds gurgly 2 No   5c. Coughed after water 2 No   5d. Water dribbles out of mouth 2 No   Teaspoon of water Continue screen   60ml (calculated) 8   6. Give 60ml of water (if teaspoon was tolerated)    6a. Choked with swallowing 2 No   6b. Voice sounds gurgly 2 No   6c. Coughed after water 2 No   6d. Water dribbles out of mouth  2 No

## 2023-10-04 NOTE — H&P
[facility] Hospital Medicine History & Physical Examination       Patient Name: Jil Lennon  MRN: [id number]  Patient Class: IP- Inpatient   Admission Date: 10/3/2023  4:30 PM  Length of Stay: 0  Admitting Service: Hospital Medicine   Attending Physician: Elvin Tan MD   Primary Care Provider: No, Primary Doctor  History source: EMR, patient and/or patient's family    CHIEF COMPLAINT   Cerebrovascular Accident (R sided facial droop beginning @ 1520. Hx of CVA @ 6 years old. . Equal  strength in upper and lower ext. )    HISTORY OF PRESENT ILLNESS:   Patient 20 patient is a 23-year-old female with congenital heart disease and associated medical comorbidities as below including pulmonary AVMs, liver disease, hypoplastic left heart and a reported stroke as a child who presented to the ER after 30 minutes prior to arrival she developed a right facial droop witnessed by a friend.  She came in as a Code Fast and had an NIH of 1 with a noticeable facial droop.  CT head was negative and CTA of the head and neck showed a suspected focal stenosis of the right M2 branch.  Stroke Neurology and Interventional Neurology were both involved and aware of imaging results with final recommendations being admit on typical stroke workup protocol, and administer aspirin.  No thrombolytics or thrombectomy is were recommended.  Hospitalist has been consulted for admission.    PAST MEDICAL HISTORY:     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    Chylothorax     Congenital absence of pulmonary artery     to [place]    Dyspnea     Encounter for blood transfusion     Fracture of wrist     x4    General anesthetics causing adverse effect in therapeutic use     was mean as a child when waking up after tonsillectomy    Hypoplastic left heart     Liver disease     enlarged liver    Obstructive sleep apnea      resolved by T&A    Obstructive sleep apnea (adult) (pediatric)     Post-Fontan protein-losing enteropathy     Stroke     mother states possibly had stroke at 4 y/o    Tonsillar and adenoid hypertrophy        PAST SURGICAL HISTORY:     Past Surgical History:   Procedure Laterality Date    BIDIRECTIONAL JOSE W/ ATRIAL SEPTECTOMY      Levine, Susan. \Hospital Has a New Name and Outlook.\""    CARDIAC SURGERY      CATHETER REFENESTRATION  1/11/2005     [facility]    COARCTATION STENT  3/9/11    COLONOSCOPY N/A 5/27/2021    Procedure: COLONOSCOPY;  Surgeon: Benigno Bowers MD;  Location: [facility] ENDO (2ND FLR);  Service: Endoscopy;  Laterality: N/A;    COMBINED RIGHT AND RETROGRADE LEFT HEART CATHETERIZATION FOR CONGENITAL HEART DEFECT N/A 3/7/2019    Procedure: CATHETERIZATION, HEART, COMBINED RIGHT AND RETROGRADE LEFT, FOR CONGENITAL HEART DEFECT;  Surgeon: Jimi Pollard Jr., MD;  Location: [facility] CATH LAB;  Service: Cardiology;  Laterality: N/A;  ped heart    ESOPHAGOGASTRODUODENOSCOPY N/A 5/27/2021    Procedure: EGD (ESOPHAGOGASTRODUODENOSCOPY);  Surgeon: Benigno Bowers MD;  [facility]  Service: Endoscopy;  Laterality: N/A;  Patient will need pediatric heart anesthesiologist due to history of Fontan     mother states having formed stool         COVID test at [facility] on 5/24-GT    FONTAN PROCEDURE, EXTRACARDIAC  9/27/2004    [facility]    LPA     RE CONSTRUCTION      [facility]    LPA STENT  2/26/.2009    OF    nati/ mitzi  age 3 days     done at [facility]    TONSILLECTOMY, ADENOIDECTOMY  11/2011    WISDOM TOOTH EXTRACTION         ALLERGIES:   Adhesive    FAMILY HISTORY:   Reviewed and non-contributory     SOCIAL HISTORY:     Social History     Tobacco Use    Smoking status: Every Day     Types: Vaping with nicotine    Smokeless tobacco: Never    Tobacco comments:     No TAMMY   Substance Use Topics    Alcohol use: Yes     Comment: occassional        HOME MEDICATIONS:     Prior to Admission medications    Medication Sig Start Date End Date  Taking? Authorizing Provider   aspirin 81 MG Chew Take 81 mg by mouth every evening.    Provider, Historical   digoxin (LANOXIN) 125 mcg tablet TAKE 1 TABLET BY MOUTH EVERY DAY IN THE EVENING 5/3/22   Jimi Pollard Jr., MD   enalapril (VASOTEC) 2.5 MG tablet TAKE 1 TABLET BY MOUTH TWICE A DAY 7/5/22   Jimi Pollard Jr., MD   furosemide (LASIX) 20 MG tablet TAKE 1 TABLET (20 MG TOTAL) BY MOUTH 2 (TWO) TIMES A DAY. 7/5/22   Jimi Pollard Jr., MD   sertraline (ZOLOFT) 25 MG tablet Take by mouth. 12/6/22   Provider, Historical   sertraline (ZOLOFT) 50 MG tablet Take 50 mg by mouth once daily.  12/28/20   Provider, Historical   sildenafil (REVATIO) 20 mg Tab Take 1 tablet (20 mg total) by mouth 2 (two) times daily. (pt taking twice a day) 8/16/21   Jimi Pollard Jr., MD   spironolactone (ALDACTONE) 25 MG tablet TAKE 1 TABLET BY MOUTH TWICE A DAY 7/5/22   Jimi Pollard Jr., MD   tretinoin (RETIN-A) 0.025 % gel Apply topically every evening. 10/14/22   Provider, Historical   VRAYLAR 1.5 mg Cap Take 1 capsule by mouth once daily. 5/25/22   Provider, Historical       REVIEW OF SYSTEMS:   Except as documented, all other systems reviewed and negative     PHYSICAL EXAM:   T 97.7 °F (36.5 °C)   /61   P 71   RR 20   O2 (!) 90 %  GENERAL: awake, alert, oriented and in no acute distress, non-toxic appearing   HEENT: normocephalic atraumatic   NECK: supple   LUNGS: Clear bilaterally, no wheezing or rales, no accessory muscle use   CVS: Regular rate and rhythm, normal peripheral perfusion  ABD: Soft, non-tender, non-distended, bowel sounds present  EXTREMITIES: no clubbing or cyanosis  SKIN: Warm, dry.   NEURO: alert and oriented, grossly without focal deficits   PSYCHIATRIC: Cooperative    LABS AND IMAGING:     Recent Labs     10/03/23  1609 10/03/23  1712   WBC  --  17.63*   RBC  --  5.25   HGB  --  16.6*   HCT 52 46.9   MCV  --  89.3   MCH  --  31.6*   MCHC  --  35.4   RDW  --  12.7   PLT  --  204     No  "results for input(s): "LACTIC" in the last 72 hours.  Recent Labs     10/03/23  1844   INR 1.1     Recent Labs     10/03/23  1637   CHOL 138   TRIG 64   LDL 88.00   VLDL 13   HDL 37      Recent Labs     10/03/23  1637 10/03/23  1803   NA  --  138   K  --  3.9   CHLORIDE  --  110*   CO2  --  19*   BUN  --  16.2   CREATININE  --  0.67   GLUCOSE  --  110*   CALCIUM  --  9.5   ALBUMIN  --  4.5   GLOBULIN  --  2.3*   ALKPHOS  --  71   ALT  --  26   AST  --  14   BILITOT  --  0.6   TSH 0.758  --      No results for input(s): "BNP", "CPK", "TROPONINI" in the last 72 hours.       CTA STROKE MULTI-PHASE  Narrative: EXAMINATION:  CTA STROKE MULTI-PHASE    CLINICAL HISTORY:  Neuro deficit, acute, stroke suspected;Left facial droop.;    TECHNIQUE:  Axial images obtained through the cervical region and [place] before and after the administration of intravenous contrast.    Coronal, sagittal, MIP and 3D reconstructions were obtained from the axial data.    Automatic exposure control was utilized to limit radiation dose.    Radiation Dose:    Total DLP: 1764 mGy*cm    COMPARISON:  CT head from the same day    FINDINGS:  Head CT with contrast:    No interval changes when compared to the previous CT.    No enhancing abnormalities.    If present, stenosis of the carotid bulbs is measured based on NASCET criteria,    i.e. area of maximal stenosis compared to the cervical ICA distal to the bulb.    Cervical CTA:    The origins of the great vessels are patent.    The common carotid arteries, carotid bulbs and internal carotid arteries are patent.    The vertebral arteries are patent.    Intracranial CTA:    The internal carotid arteries are patent.  There is suspected focal stenosis of a right M2 branch (series 14, images 146-147) otherwise the left middle cerebral arteries and bilateral anterior cerebral arteries are patent.    The vertebral arteries, basilar artery and posterior cerebral arteries are patent.    The dural " venous sinuses are patent.  Impression: 1. No large vessel occlusion.  2. Suspected focal stenosis of a right M2 branch.    Electronically signed by: Ly Mendoza  Date:    10/03/2023  Time:    16:51  CT HEAD FOR STROKE  Narrative: EXAMINATION:  CT HEAD FOR STROKE    CLINICAL HISTORY:  Neuro deficit, acute, stroke suspected;left facial droop.;    TECHNIQUE:  Axial scans were obtained from skull base to the vertex.    Coronal and sagittal reconstructions obtained from the axial data.    Automatic exposure control was utilized to limit radiation dose.    Contrast: None    Radiation Dose:    Total DLP: 1229 mGy*cm    COMPARISON:  None    FINDINGS:  There is no acute intracranial hemorrhage or edema. The gray-white matter differentiation is preserved.    There is no mass effect or midline shift. The ventricles and sulci are normal in size. The basal cisterns are patent. There is no abnormal extra-axial fluid collection.    The calvarium and skull base are intact. The visualized paranasal sinuses and the mastoid air cells are clear.  Impression: No acute intracranial abnormality.    Code fast findings given to Dr. Orr at the time of dictation.    Electronically signed by: Ly Mendoza  Date:    10/03/2023  Time:    16:21      ASSESSMENT & PLAN:   Right facial droop, acute, possible CVA   Leukocytosis without other systemic signs of infection    HX:  History congenital heart disease/hypoplastic left heart with associated complications    - stroke protocol, q.4 hours neuro checks and telemetry monitoring  - MRI brain, carotid ultrasound an echocardiogram  - permissive hypertension with parameters  - aspirin, statin at discharge  - neurology following    DVT prophylaxis: lovenox  Code status: full     If patient was admitted under observational status it is with my approval/permission.     At least 55 min was spent on this history and physical.  Time seen: 11PM 10/3/23  Critical care time = 35 min; Critical  care diagnosis = acute cva  Elvin Tan MD

## 2023-10-04 NOTE — SEDATION DOCUMENTATION
Pt has 2L O2 on for diagnostic cerebral angiogram  MD and team aware of Hypoplastic left heart syndrome if SpO2 drops below normal range

## 2023-10-04 NOTE — OP NOTE
[facility]                       [address] [id number]     PATIENT NAME:Jil Lennon    YOB: 2000      DATE OF SURGERY: 10/04/2023      SURGEON:  Rl Medrano MD     PREOPERATIVE DIAGNOSIS:  Concern for right MCA stenosis     POSTOPERATIVE DIAGNOSIS:  no evidence of right MCA stenosis; concern for right basal ganglia AVM     PROCEDURE PERFORMED:    Cerebral angiogram with catheter insertion in the following arteries:  Right common carotid, right internal carotid, right external carotid artery, right subclavian, right vertebral, left common carotid, left internal carotid, left external carotid artery, left subclavian  Interpretation of images  Ultrasound-guided right radial artery access  Moderate conscious sedation for 60 minutes     LEVEL OF SEDATION:  Conscious sedation.  Sedation administered by independent   trained observer under attending supervision with continuous monitoring of the   patient's level of consciousness and physiologic status.     TOTAL INTRASERVICE SEDATION TIME:  60 minutes.     COMPLICATIONS:  None.    APPROACH:  Right radial artery      VESSELS SELECTIVELY CATHETERIZED:   Right common carotid artery   Right internal carotid artery   Right external carotid artery   Left common carotid artery  Left internal carotid artery   Left external carotid artery   Right subclavian artery   Right vertebral artery  Left subclavian artery       DEVICES USED:  5 Fr slender sheath  5 Fr Sim 2 catheter  035 glidewire  TR band     INDICATION:  23 y.o. years old female with a history of congenital heart disease and associated medical comorbidities as below including pulmonary AVMs, liver disease, hypoplastic left heart  presents for a cerebral angiogram for evaluation of right MCA stenosis after she presented with transient left facial droop.Her MRI was negative for acute infarcts, but showed a small  encephalomalacia in the right corona radiata and diffusely hyperintense right caudate and right putamen. A cerebral angiogram was planned to evaluate the right MCA stenosis.     DETAILED DESCRIPTION:      Risks/benefits were thoroughly reviewed with patient/family prior to the procedure.  Risks inclusive but not limited to death, stroke, contrast reaction/nephropathy, access/vascular injury, and other unforeseen risks.  Patient/family provided informed consent.  Patient supine on the angio table, wrist prepped and draped in routine fashion.  Moderate conscious sedation was administered along with local anesthesia.    Under the ultrasound guidance, the right radial artery was accessed an arterial sheath was placed.  Radial cocktail of 2.5mg verapamil, 200 mcg nitroglycerin and 3500 units of heparin were given through the sheath. Diagnostic catheter telescoping over the 035 glidewire advanced to the arch and double flushed.  Enumerated vessels were then selectively catheterized and angiograms obtained as listed below.  Multiple cervical and intracranial runs were obtained in AP and lateral projection.  The films were reviewed and determined to be of good diagnostic quality.  Diagnostic catheter and sheath were then withdrawn and hemostasis was obtained with above closure device.  No immediate complications were noted.  Patient tolerated the procedure well.    Angiograms performed and findings:    Right radial artery:  Sheath placement in the right radial artery.  There is no evidence of stenosis, dissection, atherosclerosis or contrast extravasation at the site of puncture.  Right Brachiocephalic artery - cervical:  Normal origin and cervical course of the right vertebral artery.    Left internal carotid artery - cerebral:  The distal cervical, petrous, cavernous, supraclinoid segments of the left ICA appeared patent.  There is a posterior communicating artery supplying the PCA territory.  There is normal flow and  branching noted in the left SMILEY and MCA territory.  The capillary and venous phases appear unremarkable.No evidence of aneurysm, AVM, AV fistula.   Left common carotid artery - cervical:  Unremarkable left common carotid and carotid bifurcation.  The left internal carotid artery is widely patent distally the left external carotid artery and its branches appear unremarkable.    Left external carotid artery - cerebral:  The left external carotid artery and its branches appear unremarkable.  There is no evidence of AV fistula.  Left subclavian artery - cervical: (roadmap image) Normal origin and cervical course of the left vertebral artery.    Right vertebral artery - cerebral:  Right vertebral artery is co-dominant.  Unremarkable distal cervical and intracranial segments of the right vertebral artery.  Right PICA, bilateral AICA, SCA, PCA are visualized.  The basilar artery appears patent.  There is normal capillary and venous phase.    Right common carotid artery - cervical: (roadmap image) Unremarkable right common carotid artery and carotid bifurcation.  The internal carotid artery is widely patent distally.  The right external carotid artery and its branches appear unremarkable.    Right external carotid artery - cerebral:  The right external carotid artery and its branches appear unremarkable.  There is no evidence of AV fistula.  Right internal carotid artery - cerebral:  The distal cervical, petrous, cavernous, supraclinoid segments of the right ICA appear patent. There is normal flow and branching noted in right SMILEY and MCA territory. There are hypertrophied lenticulostriate branches with evidence of early venous opacification of the right internal cerebral vein draining into the straight sinus. There is no obvious large nidus visualized. This finding is concerning for an arteriovenous malformation.         OVERALL IMPRESSION:  No evidence of right MCA stenosis  Findings concerning for a right basal ganglia  Arteriovenous malformation supplied by the lenticulostriate branches draining into the right internal cerebral vein. No obvious large nidus is visualized.         ______________________________  Rl Medrano MD  Vascular and Interventional Neurology

## 2023-10-04 NOTE — BRIEF OP NOTE
Name: Jil Lennon  MRN: [id number]  Age: 23 y.o.  Gender: female    Date of Procedure: 10/04/2023    Pre-operative Diagnosis: concern for right MCA stenosis    Post-operative Diagnosis: no evidence of right MCA stenosis; concern for right basal ganglia AVM    Procedure:   Diagnostic cerebral angiogram    Access/Closure: Right radial artery access closed with TR band    Surgeon: Rl Medrano MD    Anesthesia: LA and conscious sedation    EBL: min    Description of findings:    - no evidence of right MCA stenosis  - hypertrophied right lenticulostriate arteries with early venous opacification of right internal cerebral vein. No obvious evidence of AVM nidus    Patient condition:   stable    Implant/specimen(s):  none    Plan:  - -160  - continue aspirin 81mg  - will follow up in clinic in 3 months and plan for a follow up angiogram     Rl Medrano MD  Interventional Neurology

## 2023-10-04 NOTE — NURSING
Nurses Note -- 4 Eyes      10/4/2023   1:59 AM      Skin assessed during: Admit      [x] No Altered Skin Integrity Present    []Prevention Measures Documented      [] Yes- Altered Skin Integrity Present or Discovered   [] LDA Added if Not in Epic (Describe Wound)   [] New Altered Skin Integrity was Present on Admit and Documented in LDA   [] Wound Image Taken    Wound Care Consulted? No    Attending Nurse:  CAMPBELL Chapman    Second RN/Staff Member:   MEÑO Woodward

## 2023-10-04 NOTE — PROGRESS NOTES
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  She came in as a Code Fast and had an NIH of 1 with a noticeable facial droop.  CT head was negative and CTA of the head and neck showed a suspected focal stenosis of the right M2 branch.  Stroke Neurology and Interventional Neurology were both involved and aware of imaging results with final recommendations being admit on typical stroke workup protocol, and administer aspirin.  No thrombolytics or thrombectomy is were recommended.  Hospitalist has been consulted for admission.    Interval Hx:   Patient sitting in bed, mom is at bedside.  Reports her symptoms has completely resolved.  Mom is at bedside reports she also had a slurred speech which has completely resolved as well  Discussed awaiting neurology and intervention neurology evaluation, discussed finding of her CTA head and neck and probable cerebral angiogram  Patient is still currently NPO, though reports she passed bedside swallow  I answered all their questions to the best of my knowledge in their satisfaction  Case was discussed with patient's nurse on the floor.    Objective/physical exam:  General: In no acute distress, afebrile  Chest: Clear to auscultation bilaterally  Heart: RRR, +S1, S2, no appreciable murmur  Abdomen: Soft, nontender, BS +  MSK: Warm, no lower extremity edema, no clubbing or cyanosis  Neurologic: Alert and oriented x4, Cranial nerve II-XII intact, Strength 5/5 in all 4 extremities, np focal neurologic deficits,  symmetric muscles of facial expressions     VITAL SIGNS: 24 HRS MIN & MAX LAST   Temp  Min: 97.6 °F (36.4 °C)  Max: 97.7 °F (36.5 °C) 97.6 °F (36.4 °C)   BP  Min: 128/60  Max: 157/79 128/60   Pulse  Min: 20  Max: 96  69   Resp  Min: 16  Max: 24 17   SpO2  Min: 90 %  Max: 93 % (!) 90 %     I reviewed the labs below:  Recent Labs   Lab 10/03/23  1609 10/03/23  1712   WBC  --  17.63*   RBC  --  5.25   HGB  --  16.6*   HCT 52 46.9   MCV  --  89.3   MCH  --  31.6*   MCHC  --  35.4   RDW  --  12.7   PLT  --  204   MPV  --  11.4*     Recent Labs   Lab 10/03/23  1803      K 3.9   CO2 19*   BUN 16.2   CREATININE 0.67   CALCIUM 9.5   ALBUMIN 4.5   ALKPHOS 71   ALT 26   AST 14   BILITOT 0.6     Microbiology Results (last 7 days)       ** No results found for the last 168 hours. **             See below for Radiology    Scheduled Med:   aspirin  325 mg Oral Daily      Continuous Infusions:     PRN Meds:  labetalol     Assessment/Plan:  Acute Right facial droop with slurred speech- now resolve, probable TIA  Leukocytosis without other systemic signs of infection  HX:  History CVA, congenital heart disease/hypoplastic left heart with associated complications  Vape user     Stroke protocol initiated   Neuro checks q.4  CT head without contrast shows no acute intracranial abnormality  CTA head and neck showed no large vessel occlusion, suspected focal stenosis of a right M2 branch  MRI brain without contrast shows small focus of 7 mm area of FLAIR hyperintensity in the right periventricular white matter without diffusion restriction.  This is nonspecific, can be seen in the setting of chronic small-vessel ischemic disease versus demyelinating disease  MRI brain with contrast--> no abnormal intracranial enhancement, newly evident right basal ganglia edema, nonspecific  Chart reviewed, had pediatric echo done 7/17, report not available to us  Echocardiogram- pending report  Bilateral carotid ultrasound shows no focal stenotic  lesions  Neurology Dr. Medrano on board, plan for diagnostic angio to evaluate right M2 MCA stenosis  Aspirin,  +/- plavix, +/- statin given child bearing age   Encouraged against vaping  Passed bedside swallow, will resume diet after cerebral angiogram, once cleared by Neurology  Morning labs pending collection, nurse to change it to lab collect      VTE prophylaxis: lovenox    Patient condition:  Stable    Anticipated discharge and Disposition:   TBD, awaiting neuro       All diagnosis and differential diagnosis have been reviewed; assessment and plan has been documented; I have personally reviewed the labs and test results that are presently available; I have reviewed the patients medication list; I have reviewed the consulting providers response and recommendations. I have reviewed or attempted to review medical records based upon their availability    All of the patient's questions have been  addressed and answered. Patient's is agreeable to the above stated plan. I will continue to monitor closely and make adjustments to medical management as needed.  _____________________________________________________________________    Nutrition Status:     Radiology:   I have personally reviewed the images and agree with radiologist report  MRI Brain Without Contrast  Narrative: EXAMINATION:  MRI BRAIN WITHOUT CONTRAST    CLINICAL HISTORY:  Stroke, follow up;    TECHNIQUE:  Multiplanar multisequence MR imaging of the brain was performed without contrast.    COMPARISON:  CT of the head 10/03/2023.    FINDINGS:  There is normal brain formation.  There is normal gray-white matter differentiation.  There is no restricted diffusion.  There is no hemorrhage, hydrocephalus, or midline shift.  There is no intra or extra-axial fluid collection.  There is very minimal periventricular FLAIR hyperintensity.  This measures 7 mm.  There is no susceptibility artifact.  There are normal flow voids in the bilateral carotid siphons.  The  sella is normal.  The calvarium is normal in signal intensity.  The bilateral orbits are normal.  The paranasal sinuses are free of disease.  Impression: Small focus of 7 mm area FLAIR hyperintensity in the right periventricular white matter without diffusion restriction.  This is nonspecific.  This can be seen in the setting of chronic small vessel ischemic disease versus demyelinating disease.    Electronically signed by: Vasu Blunt MD  Date:    10/03/2023  Time:    20:58  CTA STROKE MULTI-PHASE  Narrative: EXAMINATION:  CTA STROKE MULTI-PHASE    CLINICAL HISTORY:  Neuro deficit, acute, stroke suspected;Left facial droop.;    TECHNIQUE:  Axial images obtained through the cervical region and [place] before and after the administration of intravenous contrast.    Coronal, sagittal, MIP and 3D reconstructions were obtained from the axial data.    Automatic exposure control was utilized to limit radiation dose.    Radiation Dose:    Total DLP: 1764 mGy*cm    COMPARISON:  CT head from the same day    FINDINGS:  Head CT with contrast:    No interval changes when compared to the previous CT.    No enhancing abnormalities.    If present, stenosis of the carotid bulbs is measured based on NASCET criteria,    i.e. area of maximal stenosis compared to the cervical ICA distal to the bulb.    Cervical CTA:    The origins of the great vessels are patent.    The common carotid arteries, carotid bulbs and internal carotid arteries are patent.    The vertebral arteries are patent.    Intracranial CTA:    The internal carotid arteries are patent.  There is suspected focal stenosis of a right M2 branch (series 14, images 146-147) otherwise the left middle cerebral arteries and bilateral anterior cerebral arteries are patent.    The vertebral arteries, basilar artery and posterior cerebral arteries are patent.    The dural venous sinuses are patent.  Impression: 1. No large vessel occlusion.  2. Suspected focal stenosis of a  right M2 branch.    Electronically signed by: Ly Mendoza  Date:    10/03/2023  Time:    16:51  CT HEAD FOR STROKE  Narrative: EXAMINATION:  CT HEAD FOR STROKE    CLINICAL HISTORY:  Neuro deficit, acute, stroke suspected;left facial droop.;    TECHNIQUE:  Axial scans were obtained from skull base to the vertex.    Coronal and sagittal reconstructions obtained from the axial data.    Automatic exposure control was utilized to limit radiation dose.    Contrast: None    Radiation Dose:    Total DLP: 1229 mGy*cm    COMPARISON:  None    FINDINGS:  There is no acute intracranial hemorrhage or edema. The gray-white matter differentiation is preserved.    There is no mass effect or midline shift. The ventricles and sulci are normal in size. The basal cisterns are patent. There is no abnormal extra-axial fluid collection.    The calvarium and skull base are intact. The visualized paranasal sinuses and the mastoid air cells are clear.  Impression: No acute intracranial abnormality.    Code fast findings given to Dr. Orr at the time of dictation.    Electronically signed by: Ly Mendoza  Date:    10/03/2023  Time:    16:21      Dustin Sharif MD  Department of Hospital Medicine   [facility]   10/04/2023

## 2023-10-04 NOTE — CONSULTS
[facility] - Neurology  Neurology  Consult Note    Patient Name: Jil Lennon  MRN: [id number]  Admission Date: 10/3/2023  Hospital Length of Stay: 1 days  Code Status: Full Code   Attending Provider: Dsutin Sharif MD   Consulting Provider: Joyce Aguilar NP  Primary Care Physician: Liilya, Primary Doctor  Principal Problem:TIA (transient ischemic attack)    Inpatient consult to Vascular (Stroke) Neurology  Consult performed by: Joyce Aguilar NP  Consult ordered by: Kam Orr MD         Subjective:     Chief Complaint:  Left facial droop      HPI:   23 year old female with history of AVM, liver disease, JUDITH, and 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 She reported she was at work, had a normal day, at 1500 she had sudden onset facial numbness, she felt like her face was drooping, she talked to a co-worker who reported her face was drooping, she face timed her mom who told her to call EMS.  Upon arrival to ED, a stroke alert was called. NIH: 1, /76, discussed with neurology on call, no recommendations for TNK, symptoms resolved shortly after her arrival to ED. CT head was unremarkable. CTA head and neck was negative for LVO but did show right MCA M2 stenosis. Neurology was consulted for stroke workup.       Mother at bedside, reports patient had heart surgery when she was 4 years old. Since then, she has had 4 heart surgeries. Mother states she suspected the patient had a stroke, when she was 6 she had vertigo and was taken to the doctor who told them she had some scarring on the brain. When mother reviewed records, there was mention of a stroke but it seems like the patient did not have clinical symptoms from this. She is on aspirin daily. She does vape.            Past Medical History:   Diagnosis Date    AVM (arteriovenous malformation)     pulmonary micro AVM noted  during recent cath    Chylothorax     Congenital absence of pulmonary artery     to [place]    Dyspnea     Encounter for blood transfusion     Fracture of wrist     x4    General anesthetics causing adverse effect in therapeutic use     was mean as a child when waking up after tonsillectomy    Hypoplastic left heart     Liver disease     enlarged liver    Obstructive sleep apnea     resolved by T&A    Obstructive sleep apnea (adult) (pediatric)     Post-Fontan protein-losing enteropathy     Stroke     mother states possibly had stroke at 4 y/o    Tonsillar and adenoid hypertrophy        Past Surgical History:   Procedure Laterality Date    BIDIRECTIONAL JOSE W/ ATRIAL SEP[facility]CTOMY      [facility]    CARDIAC SURGERY      CATHETER REFENESTRATION  1/11/2005     OF    COARCTATION STENT  3/9/11    COLONOSCOPY N/A 5/27/2021    Procedure: COLONOSCOPY;  Surgeon: Benigno Bowers MD;  Location: [facility] ENDO (2ND FLR);  Service: Endoscopy;  Laterality: N/A;    COMBINED RIGHT AND RETROGRADE LEFT HEART CATHETERIZATION FOR CONGENITAL HEART DEFECT N/A 3/7/2019    Procedure: CATHETERIZATION, HEART, COMBINED RIGHT AND RETROGRADE LEFT, FOR CONGENITAL HEART DEFECT;  Surgeon: Jimi Pollard Jr., MD;  Location: [facility] CATH LAB;  Service: Cardiology;  Laterality: N/A;  ped heart    ESOPHAGOGASTRODUODENOSCOPY N/A 5/27/2021    Procedure: EGD (ESOPHAGOGASTRODUODENOSCOPY);  Surgeon: Benigno Bowers MD;  Location: [facility] ENDO (2ND FLR);  Service: Endoscopy;  Laterality: N/A;  Patient will need pediatric heart anesthesiologist due to history of Fontan     mother states having formed stool         C[facility] on 5/24-GT    FONTAN PROCEDURE, EXTRACARDIAC  9/27/2004    [facility]    L[facility]     [facility]    LPA STENT  2/26/.2009    [place]ENISH    nati/ mitzi  age 3 days     done at [facility]    TONSILLECTOMY, ADENOIDECTOMY  11/2011    WISDOM TOOTH EXTRACTION         Review of patient's allergies  indicates:   Allergen Reactions    Adhesive Dermatitis       Current Neurological Medications:     No current facility-administered medications on file prior to encounter.     Current Outpatient Medications on File Prior to Encounter   Medication Sig    aspirin 81 MG Chew Take 81 mg by mouth every evening.    digoxin (LANOXIN) 125 mcg tablet TAKE 1 TABLET BY MOUTH EVERY DAY IN THE EVENING    enalapril (VASOTEC) 2.5 MG tablet TAKE 1 TABLET BY MOUTH TWICE A DAY    furosemide (LASIX) 20 MG tablet TAKE 1 TABLET (20 MG TOTAL) BY MOUTH 2 (TWO) TIMES A DAY.    sertraline (ZOLOFT) 25 MG tablet Take by mouth.    sertraline (ZOLOFT) 50 MG tablet Take 50 mg by mouth once daily.     sildenafil (REVATIO) 20 mg Tab Take 1 tablet (20 mg total) by mouth 2 (two) times daily. (pt taking twice a day)    spironolactone (ALDACTONE) 25 MG tablet TAKE 1 TABLET BY MOUTH TWICE A DAY    tretinoin (RETIN-A) 0.025 % gel Apply topically every evening.    VRAYLAR 1.5 mg Cap Take 1 capsule by mouth once daily.     Family History       Problem Relation (Age of Onset)    Cancer Other (80)    Glaucoma Paternal Grandmother    Hypertension Maternal Grandfather, Paternal Grandmother    No Known Problems Father, Maternal Grandmother, Sister, Brother, Maternal Aunt, Maternal Uncle, Paternal Aunt, Paternal Uncle, Paternal Grandfather    Skin cancer Maternal Grandfather    Thyroid disease Mother    Urologic Abnormality Other          Tobacco Use    Smoking status: Every Day     Types: Vaping with nicotine    Smokeless tobacco: Never    Tobacco comments:     No TAMMY   Substance and Sexual Activity    Alcohol use: Yes     Comment: occassional    Drug use: No    Sexual activity: Yes     Partners: Male, Female     Birth control/protection: I.U.D.     Review of Systems   Neurological:  Negative for dizziness, tremors, seizures, syncope, facial asymmetry, speech difficulty, weakness, light-headedness, numbness and headaches.   All other systems  reviewed and are negative.    Objective:     Vital Signs (Most Recent):  Temp: 97.9 °F (36.6 °C) (10/04/23 0744)  Pulse: 71 (10/04/23 0744)  Resp: 19 (10/04/23 0744)  BP: 136/61 (10/04/23 0744)  SpO2: (!) 90 % (10/04/23 0744) Vital Signs (24h Range):  Temp:  [97.6 °F (36.4 °C)-97.9 °F (36.6 °C)] 97.9 °F (36.6 °C)  Pulse:  [20-96] 71  Resp:  [16-24] 19  SpO2:  [90 %-93 %] 90 %  BP: (128-157)/(60-79) 136/61     Weight: 87.8 kg (193 lb 9 oz)  Body mass index is 29 kg/m².     Physical Exam  Vitals and nursing note reviewed.   Constitutional:       General: She is not in acute distress.     Appearance: Normal appearance. She is not ill-appearing or toxic-appearing.   HENT:      Head: Normocephalic.   Eyes:      General: No visual field deficit.     Extraocular Movements:      Right eye: Normal extraocular motion and no nystagmus.      Left eye: Normal extraocular motion and no nystagmus.      Pupils: Pupils are equal, round, and reactive to light.   Cardiovascular:      Rate and Rhythm: Normal rate.   Pulmonary:      Effort: Pulmonary effort is normal.      Breath sounds: Normal breath sounds.   Musculoskeletal:         General: No swelling. Normal range of motion.      Cervical back: Normal range of motion.      Right lower leg: No edema.      Left lower leg: No edema.   Skin:     General: Skin is warm and dry.   Neurological:      General: No focal deficit present.      Mental Status: She is alert and oriented to person, place, and time.      Cranial Nerves: No dysarthria or facial asymmetry.      Sensory: No sensory deficit.      Motor: No weakness, tremor, atrophy, abnormal muscle tone, seizure activity or pronator drift.      Coordination: Coordination normal. Finger-Nose-Finger Test normal.   Psychiatric:         Attention and Perception: Attention normal.         Mood and Affect: Affect normal.         Speech: Speech normal.         Behavior: Behavior normal.          NEUROLOGICAL EXAMINATION:     MENTAL STATUS    Oriented to person, place, and time.   Speech: speech is normal     CRANIAL NERVES     CN III, IV, VI   Pupils are equal, round, and reactive to light.    GAIT AND COORDINATION      Coordination   Finger to nose coordination: normal      Significant Labs:   Recent Lab Results  (Last 5 results in the past 24 hours)        10/03/23  1844   10/03/23  1803   10/03/23  1712   10/03/23  1637   10/03/23  1609        Albumin/Globulin Ratio   2.0             Albumin   4.5             ALP   71             ALT   26             AST   14             Baso #     0.04           Basophil %     0.2           BILIRUBIN TOTAL   0.6             BUN   16.2             Calcium   9.5             Chloride   110             Cholesterol Total       138         CO2   19             Creatinine   0.67             CRP, High Sensitivity   0.32             eGFR   >60             Eos #     0.08           Eosinophil %     0.5           Globulin, Total   2.3             Glucose   110             HDL       37         Hematocrit     46.9           Hemoglobin     16.6           Immature Grans (Abs)     0.09           Immature Granulocytes     0.5           INR 1.1               LDL Cholesterol External       88.00         Lymph #     0.98           LYMPH %     5.6           MCH     31.6           MCHC     35.4           MCV     89.3           Mono #     0.88           Mono %     5.0           MPV     11.4           Neut #     15.56           Neut %     88.2           nRBC     0.0           Platelet Count     204           POC Anion Gap         17       POC BUN         27       POC Chloride         105       POC Creatinine         0.5       POC Glucose         114       POC Hematocrit         52       POC HEMOGLOBIN         18       POC Ionized Calcium         1.25       Potassium, Blood Gas         4.7       Sodium, Blood Gas         140       POC TCO2 (MEASURED)         24       Potassium   3.9             PROTEIN TOTAL   6.8             Protime  14.3               RBC     5.25           RDW     12.7           Sample         VENOUS       Sodium   138             Total Cholesterol/HDL Ratio       4         Triglycerides       64         TSH       0.758         Very Low Density Lipoprotein       13         WBC     17.63                                  Significant Imaging: I have reviewed all pertinent imaging results/findings within the past 24 hours.    Assessment and Plan:     * TIA (transient ischemic attack)  Possible TIA-left facial droop, slurred speech- resolved      Was on aspirin 81 mg at home, could consider adding Plavix 75 mg for 21 days will discuss with MD during rounds   Is not on a statin at home   vapes daily  Keep NPO for now  -will have diagnostic angio today with Dr. Medrano to evaluate right MCA M2 stenosis  -ok to eat after angio  Mri brain with contrast to evaluate the hyperintensity on MRI w/o   Will discuss with MD during rounds if diagnostic angio is negative, ok for discharge later?      Antithrombotics for secondary stroke prevention: Antiplatelets: Aspirin: 81 mg daily    Statins for secondary stroke prevention and hyperlipidemia, if present:   Statins: None: Reason: will reassess after stroke workup    Aggressive risk factor modification: HTN     Rehab efforts: The patient has been evaluated by a stroke team provider and the therapy needs have been fully considered based off the presenting complaints and exam findings. The following therapy evaluations are needed: None    Diagnostics ordered/pending: TTE to assess cardiac function/status, diagnostic angio       Stroke workup  -CUS: negative  -MRI brain:   Small focus of 7 mm area FLAIR hyperintensity in the right periventricular white matter without diffusion restriction.  This is nonspecific.  This can be seen in the setting of chronic small vessel ischemic disease versus demyelinating disease.  -CTA head and neck:  1. No large vessel occlusion.  2. Suspected focal stenosis of a  right M2 branch.   -CT head: negative   -LDL: 88  -TSH: 0.758      VTE prophylaxis: Mechanical prophylaxis: Place SCDs    BP parameters: TIA: SBP <140 (goal BP)            VTE Risk Mitigation (From admission, onward)         Ordered     IP VTE LOW RISK PATIENT  Once         10/03/23 1844     Place sequential compression device  Until discontinued         10/03/23 1844                Thank you for your consult. Further recommendations to follow from MD Joyce Aguilar, NP  Neurology  [facility] - Neurology

## 2023-10-04 NOTE — PT/OT/SLP PROGRESS
Physical Therapy      Patient Name:  Jil Lennon   MRN:  [id number]    Patient not seen today for PT eval. Pt currently off floor . Will follow-up as schedule permits.

## 2023-10-04 NOTE — ASSESSMENT & PLAN NOTE
Possible TIA-left facial droop, slurred speech- resolved      Was on aspirin 81 mg at home, could consider adding Plavix 75 mg for 21 days will discuss with MD during rounds   Is not on a statin at home   vapes daily  Keep NPO for now  -will have diagnostic angio today with Dr. Medrano to evaluate right MCA M2 stenosis  -ok to eat after angio  Mri brain with contrast to evaluate the hyperintensity on MRI w/o   Will discuss with MD during rounds if diagnostic angio is negative, ok for discharge later?      Antithrombotics for secondary stroke prevention: Antiplatelets: Aspirin: 81 mg daily    Statins for secondary stroke prevention and hyperlipidemia, if present:   Statins: None: Reason: will reassess after stroke workup    Aggressive risk factor modification: HTN     Rehab efforts: The patient has been evaluated by a stroke team provider and the therapy needs have been fully considered based off the presenting complaints and exam findings. The following therapy evaluations are needed: None    Diagnostics ordered/pending: TTE to assess cardiac function/status, diagnostic angio       Stroke workup  -CUS: negative  -MRI brain:   Small focus of 7 mm area FLAIR hyperintensity in the right periventricular white matter without diffusion restriction.  This is nonspecific.  This can be seen in the setting of chronic small vessel ischemic disease versus demyelinating disease.  -CTA head and neck:  1. No large vessel occlusion.  2. Suspected focal stenosis of a right M2 branch.   -CT head: negative   -LDL: 88  -TSH: 0.758      VTE prophylaxis: Mechanical prophylaxis: Place SCDs    BP parameters: TIA: SBP <140 (goal BP)

## 2023-10-04 NOTE — SUBJECTIVE & OBJECTIVE
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    Chylothorax     Congenital absence of pulmonary artery     to [facility]    Dyspnea     Encounter for blood transfusion     Fracture of wrist     x4    General anesthetics causing adverse effect in therapeutic use     was mean as a child when waking up after tonsillectomy    Hypoplastic left heart     Liver disease     enlarged liver    Obstructive sleep apnea     resolved by T&A    Obstructive sleep apnea (adult) (pediatric)     Post-Fontan protein-losing enteropathy     Stroke     mother states possibly had stroke at 4 y/o    Tonsillar and adenoid hypertrophy        Past Surgical History:   Procedure Laterality Date    BIDIRECTIONAL JOSE W/ ATRIAL SEPTECTOMY      [facility]    CARDIAC SURGERY      CATHETER REFENESTRATION  1/11/2005     [facility]    COARCTATION STENT  3/9/11    COLONOSCOPY N/A 5/27/2021    Procedure: COLONOSCOPY;  Surgeon: Benigno Bowers MD;  Location: [facility] ENDO (2ND FLR);  Service: Endoscopy;  Laterality: N/A;    COMBINED RIGHT AND RETROGRADE LEFT HEART CATHETERIZATION FOR CONGENITAL HEART DEFECT N/A 3/7/2019    Procedure: CATHETERIZATION, HEART, COMBINED RIGHT AND RETROGRADE LEFT, FOR CONGENITAL HEART DEFECT;  Surgeon: Jimi Pollard Jr., MD;  Location: [facility] CATH LAB;  Service: Cardiology;  Laterality: N/A;  ped heart    ESOPHAGOGASTRODUODENOSCOPY N/A 5/27/2021    Procedure: EGD (ESOPHAGOGASTRODUODENOSCOPY);  Surgeon: Benigno Bowers MD;  Location: [facility] ENDO (2ND FLR);  Service: Endoscopy;  Laterality: N/A;  Patient will need pediatric heart anesthesiologist due to history of Fontan     mother states having formed stool         COVID test at [facility] on 5/24-GT    FONTAN PROCEDURE, EXTRACARDIAC  9/27/2004    [facility]    LPA     RE CONSTRUCTION      [facility]    LPA STENT  2/26/.2009    OF    narwood/ mitzi  age 3 days     done at [facility]    TONSILLECTOMY, ADENOIDECTOMY   11/2011    WISDOM TOOTH EXTRACTION         Review of patient's allergies indicates:   Allergen Reactions    Adhesive Dermatitis       Current Neurological Medications:     No current facility-administered medications on file prior to encounter.     Current Outpatient Medications on File Prior to Encounter   Medication Sig    aspirin 81 MG Chew Take 81 mg by mouth every evening.    digoxin (LANOXIN) 125 mcg tablet TAKE 1 TABLET BY MOUTH EVERY DAY IN THE EVENING    enalapril (VASOTEC) 2.5 MG tablet TAKE 1 TABLET BY MOUTH TWICE A DAY    furosemide (LASIX) 20 MG tablet TAKE 1 TABLET (20 MG TOTAL) BY MOUTH 2 (TWO) TIMES A DAY.    sertraline (ZOLOFT) 25 MG tablet Take by mouth.    sertraline (ZOLOFT) 50 MG tablet Take 50 mg by mouth once daily.     sildenafil (REVATIO) 20 mg Tab Take 1 tablet (20 mg total) by mouth 2 (two) times daily. (pt taking twice a day)    spironolactone (ALDACTONE) 25 MG tablet TAKE 1 TABLET BY MOUTH TWICE A DAY    tretinoin (RETIN-A) 0.025 % gel Apply topically every evening.    VRAYLAR 1.5 mg Cap Take 1 capsule by mouth once daily.     Family History       Problem Relation (Age of Onset)    Cancer Other (80)    Glaucoma Paternal Grandmother    Hypertension Maternal Grandfather, Paternal Grandmother    No Known Problems Father, Maternal Grandmother, Sister, Brother, Maternal Aunt, Maternal Uncle, Paternal Aunt, Paternal Uncle, Paternal Grandfather    Skin cancer Maternal Grandfather    Thyroid disease Mother    Urologic Abnormality Other          Tobacco Use    Smoking status: Every Day     Types: Vaping with nicotine    Smokeless tobacco: Never    Tobacco comments:     No TAMMY   Substance and Sexual Activity    Alcohol use: Yes     Comment: occassional    Drug use: No    Sexual activity: Yes     Partners: Male, Female     Birth control/protection: I.U.D.     Review of Systems   Neurological:  Negative for dizziness, tremors, seizures, syncope, facial asymmetry, speech difficulty, weakness,  light-headedness, numbness and headaches.   All other systems reviewed and are negative.    Objective:     Vital Signs (Most Recent):  Temp: 97.9 °F (36.6 °C) (10/04/23 0744)  Pulse: 71 (10/04/23 0744)  Resp: 19 (10/04/23 0744)  BP: 136/61 (10/04/23 0744)  SpO2: (!) 90 % (10/04/23 0744) Vital Signs (24h Range):  Temp:  [97.6 °F (36.4 °C)-97.9 °F (36.6 °C)] 97.9 °F (36.6 °C)  Pulse:  [20-96] 71  Resp:  [16-24] 19  SpO2:  [90 %-93 %] 90 %  BP: (128-157)/(60-79) 136/61     Weight: 87.8 kg (193 lb 9 oz)  Body mass index is 29 kg/m².     Physical Exam  Vitals and nursing note reviewed.   Constitutional:       General: She is not in acute distress.     Appearance: Normal appearance. She is not ill-appearing or toxic-appearing.   HENT:      Head: Normocephalic.   Eyes:      General: No visual field deficit.     Extraocular Movements:      Right eye: Normal extraocular motion and no nystagmus.      Left eye: Normal extraocular motion and no nystagmus.      Pupils: Pupils are equal, round, and reactive to light.   Cardiovascular:      Rate and Rhythm: Normal rate.   Pulmonary:      Effort: Pulmonary effort is normal.      Breath sounds: Normal breath sounds.   Musculoskeletal:         General: No swelling. Normal range of motion.      Cervical back: Normal range of motion.      Right lower leg: No edema.      Left lower leg: No edema.   Skin:     General: Skin is warm and dry.   Neurological:      General: No focal deficit present.      Mental Status: She is alert and oriented to person, place, and time.      Cranial Nerves: No dysarthria or facial asymmetry.      Sensory: No sensory deficit.      Motor: No weakness, tremor, atrophy, abnormal muscle tone, seizure activity or pronator drift.      Coordination: Coordination normal. Finger-Nose-Finger Test normal.   Psychiatric:         Attention and Perception: Attention normal.         Mood and Affect: Affect normal.         Speech: Speech normal.         Behavior: Behavior  normal.          NEUROLOGICAL EXAMINATION:     MENTAL STATUS   Oriented to person, place, and time.   Speech: speech is normal     CRANIAL NERVES     CN III, IV, VI   Pupils are equal, round, and reactive to light.    GAIT AND COORDINATION      Coordination   Finger to nose coordination: normal      Significant Labs:   Recent Lab Results  (Last 5 results in the past 24 hours)        10/03/23  1844   10/03/23  1803   10/03/23  1712   10/03/23  1637   10/03/23  1609        Albumin/Globulin Ratio   2.0             Albumin   4.5             ALP   71             ALT   26             AST   14             Baso #     0.04           Basophil %     0.2           BILIRUBIN TOTAL   0.6             BUN   16.2             Calcium   9.5             Chloride   110             Cholesterol Total       138         CO2   19             Creatinine   0.67             CRP, High Sensitivity   0.32             eGFR   >60             Eos #     0.08           Eosinophil %     0.5           Globulin, Total   2.3             Glucose   110             HDL       37         Hematocrit     46.9           Hemoglobin     16.6           Immature Grans (Abs)     0.09           Immature Granulocytes     0.5           INR 1.1               LDL Cholesterol External       88.00         Lymph #     0.98           LYMPH %     5.6           MCH     31.6           MCHC     35.4           MCV     89.3           Mono #     0.88           Mono %     5.0           MPV     11.4           Neut #     15.56           Neut %     88.2           nRBC     0.0           Platelet Count     204           POC Anion Gap         17       POC BUN         27       POC Chloride         105       POC Creatinine         0.5       POC Glucose         114       POC Hematocrit         52       POC HEMOGLOBIN         18       POC Ionized Calcium         1.25       Potassium, Blood Gas         4.7       Sodium, Blood Gas         140       POC TCO2 (MEASURED)         24       Potassium    3.9             PROTEIN TOTAL   6.8             Protime 14.3               RBC     5.25           RDW     12.7           Sample         VENOUS       Sodium   138             Total Cholesterol/HDL Ratio       4         Triglycerides       64         TSH       0.758         Very Low Density Lipoprotein       13         WBC     17.63                                  Significant Imaging: I have reviewed all pertinent imaging results/findings within the past 24 hours.

## 2023-10-04 NOTE — CONSULTS
HPI:    Jil Lennon is a 23 y.o. female with transient left facial droop that resolved spontaneously was noted to have right MCA stenosis. She has an extensive cardiac history and had heart surgery at age 4. She was suspected to have a stroke at that time due to changes seen on previous brain scan. She is on aspirin.     She denies any episodes of numbness, tingling, weakness, vision changes or speech changes. Denies any previous episodes of TIA/Stroke.      ROS:  ROS   ROS as described in HPI.     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    Chylothorax     Congenital absence of pulmonary artery     to [facility]    Dyspnea     Encounter for blood transfusion     Fracture of wrist     x4    General anesthetics causing adverse effect in therapeutic use     was mean as a child when waking up after tonsillectomy    Hypoplastic left heart     Liver disease     enlarged liver    Obstructive sleep apnea     resolved by T&A    Obstructive sleep apnea (adult) (pediatric)     Post-Fontan protein-losing enteropathy     Stroke     mother states possibly had stroke at 4 y/o    Tonsillar and adenoid hypertrophy      Past Surgical History:   Procedure Laterality Date    BIDIRECTIONAL JOSE W/ ATRIAL SEPTECTOMY      [facility]    CARDIAC SURGERY      CATHETER REFENESTRATION  1/11/2005     [facility]    COARCTATION STENT  3/9/11    COLONOSCOPY N/A 5/27/2021    Procedure: COLONOSCOPY;  Surgeon: Benigno Bowers MD;  Location: [facility] ENDO ([address] [place] [address]);  Service: Endoscopy;  Laterality: N/A;    COMBINED RIGHT AND RETROGRADE LEFT HEART CATHETERIZATION FOR CONGENITAL HEART DEFECT N/A 3/7/2019    Procedure: CATHETERIZATION, HEART, COMBINED RIGHT AND RETROGRADE LEFT, FOR CONGENITAL HEART DEFECT;  Surgeon: Jimi Pollard Jr., MD;  Location: [facility] CATH LAB;  Service: Cardiology;  Laterality: N/A;  ped heart    ESOPHAGOGASTRODUODENOSCOPY N/A 5/27/2021    Procedure:  EGD (ESOPHAGOGASTRODUODENOSCOPY);  Surgeon: Benigno Bowers MD;  Location: [facility] ([address] [place] [address]);  Service: Endoscopy;  Laterality: N/A;  Patient will need pediatric heart anesthesiologist due to history of Fontan     mother states having formed stool         COVID test at [facility] on 5/24-GT    FONTAN PROCEDURE, EXTRACARDIAC  9/27/2004    [facility]    L[facility]     [facility]    [facility] ST[facility]  2/26/.2009    [facility]    narwood/ mitzi  age 4 days     done at [facility]    TONSILLECTOMY, ADENOIDECTOMY  11/2011    WISDOM TOOTH EXTRACTION       Family History   Problem Relation Age of Onset    No Known Problems Father     Urologic Abnormality Other     Thyroid disease Mother     No Known Problems Maternal Grandmother     Hypertension Maternal Grandfather     Skin cancer Maternal Grandfather     Hypertension Paternal Grandmother     Glaucoma Paternal Grandmother     No Known Problems Sister     No Known Problems Brother     No Known Problems Maternal Aunt     No Known Problems Maternal Uncle     No Known Problems Paternal Aunt     No Known Problems Paternal Uncle     No Known Problems Paternal Grandfather     Cancer Other 80        colon    Heart disease Neg Hx     Diabetes Neg Hx     Retinal detachment Neg Hx     Amblyopia Neg Hx     Blindness Neg Hx     Strabismus Neg Hx     Macular degeneration Neg Hx     Stroke Neg Hx     Breast cancer Neg Hx     Ovarian cancer Neg Hx     Esophageal cancer Neg Hx      Review of patient's allergies indicates:   Allergen Reactions    Adhesive Dermatitis       Current Facility-Administered Medications:     aspirin EC tablet 325 mg, 325 mg, Oral, Daily, Elvin Tan MD    cariprazine Cap 1.5 mg, 1.5 mg, Oral, Daily, Dustin Sharif MD    digoxin tablet 0.125 mg, 0.125 mg, Oral, QHS, Dustin Sharif MD    furosemide tablet 20 mg, 20 mg, Oral, BID, Dustin Sharif MD    labetaloL injection 10 mg, 10 mg, Intravenous, Q6H PRN, Rosalia Miller, [facility]    sertraline tablet 50 mg, 50  mg, Oral, Daily, Dustin Sharif MD    sildenafil tablet 20 mg, 20 mg, Oral, BID, Dustin Sharif MD    spironolactone tablet 25 mg, 25 mg, Oral, BID, Dustin Sharif MD  Outpatient Medications Marked as Taking for the 10/3/23 encounter (Hospital Encounter)   Medication Sig Dispense Refill    aspirin 81 MG Chew Take 81 mg by mouth every evening.      digoxin (LANOXIN) 125 mcg tablet TAKE 1 TABLET BY MOUTH EVERY DAY IN THE EVENING 90 tablet 10    enalapril (VASOTEC) 2.5 MG tablet TAKE 1 TABLET BY MOUTH TWICE A DAY 60 tablet 9    furosemide (LASIX) 20 MG tablet TAKE 1 TABLET (20 MG TOTAL) BY MOUTH 2 (TWO) TIMES A DAY. 60 tablet 10    sertraline (ZOLOFT) 25 MG tablet Take by mouth.      sertraline (ZOLOFT) 50 MG tablet Take 50 mg by mouth once daily.       sildenafil (REVATIO) 20 mg Tab Take 1 tablet (20 mg total) by mouth 2 (two) times daily. (pt taking twice a day) 60 tablet 6    spironolactone (ALDACTONE) 25 MG tablet TAKE 1 TABLET BY MOUTH TWICE A DAY 60 tablet 9    tretinoin (RETIN-A) 0.025 % gel Apply topically every evening.      VRAYLAR 1.5 mg Cap Take 1 capsule by mouth once daily.       Social History     Tobacco Use    Smoking status: Every Day     Types: Vaping with nicotine    Smokeless tobacco: Never    Tobacco comments:     No TAMMY   Substance Use Topics    Alcohol use: Yes     Comment: occassional    Drug use: No         Vitals:    10/04/23 1220   BP: (!) 147/74   Pulse: 71   Resp: 18   Temp: 97.9 °F (36.6 °C)       Physical Exam:       Neuro:  Alert & oriented x 3  EOMI, PERRLA, visual field intact in all 4 quadrants  Face symmetric, speech clear and fluent, facial sensation equal bilaterally  Tongue midline  Strength 5/5 in bilateral upper and lower extremities   Sensation intact and equal bilaterally  Gait steady and balanced     Imaging:  Reviewed CTA, MRI    Assessment:   23 F with transient left facial droop was noted to have right MCA stenosis on CTA. She presents for a cerebral angiogram MRI negative for  an acute infarct.     Plan:   - DSA    Rl Medrano MD  Vascular and Interventional Neurology

## 2023-10-04 NOTE — PLAN OF CARE
10/04/23 1520   Discharge Assessment   Assessment Type Discharge Planning Assessment   Confirmed/corrected address, phone number and insurance Yes   Confirmed Demographics Correct on Facesheet   Source of Information patient;family   Communicated MACK with patient/caregiver Date not available/Unable to determine   Reason For Admission TIA   People in Home parent(s)   Do you expect to return to your current living situation? Yes   Do you have help at home or someone to help you manage your care at home? Yes   Who are your caregiver(s) and their phone number(s)? Jennifer Jeansonne [phone]   Prior to hospitilization cognitive status: Unable to Assess   Current cognitive status: Alert/Oriented   Home Accessibility wheelchair accessible;stairs within home;other (see comments)  (20stairs)   Home Layout Able to live on 1st floor   Equipment Currently Used at Home none   Patient currently being followed by outpatient case management? No   Do you currently have service(s) that help you manage your care at home? No   Do you take prescription medications? Yes   Do you have prescription coverage? Yes   Coverage [employer]   Who is going to help you get home at discharge? Mother   How do you get to doctors appointments? car, drives self   Are you on dialysis? No   Do you take coumadin? No   DME Needed Upon Discharge  none   Discharge Plan discussed with: Patient;Parent(s)   Transition of Care Barriers None   Discharge Plan A Home with family   Discharge Plan B Home with family   Physical Activity   On average, how many days per week do you engage in moderate to strenuous exercise (like a brisk walk)? 0 days   On average, how many minutes do you engage in exercise at this level? 0 min   Financial Resource Strain   How hard is it for you to pay for the very basics like food, housing, medical care, and heating? Not hard   Housing Stability   In the last 12 months, was there a time when you were not able to pay the mortgage or  rent on time? N   In the last 12 months, how many places have you lived? 1   In the last 12 months, was there a time when you did not have a steady place to sleep or slept in a shelter (including now)? N   Transportation Needs   In the past 12 months, has lack of transportation kept you from medical appointments or from getting medications? no   In the past 12 months, has lack of transportation kept you from meetings, work, or from getting things needed for daily living? No   Food Insecurity   Within the past 12 months, you worried that your food would run out before you got the money to buy more. Never true   Within the past 12 months, the food you bought just didn't last and you didn't have money to get more. Never true   Stress   Do you feel stress - tense, restless, nervous, or anxious, or unable to sleep at night because your mind is troubled all the time - these days? Not at all   Social Connections   In a typical week, how many times do you talk on the phone with family, friends, or neighbors? More than 3   How often do you attend Hinduism or Confucianist services? More than 4   Do you belong to any clubs or organizations such as Hinduism groups, unions, fraternal or athletic groups, or school groups? No   How often do you attend meetings of the clubs or organizations you belong to? Never   Are you , , , , never , or living with a partner? Never marrie   Alcohol Use   Q1: How often do you have a drink containing alcohol? Monthly or l   Q2: How many drinks containing alcohol do you have on a typical day when you are drinking? 1 or 2   Q3: How often do you have six or more drinks on one occasion? Never   OTHER   Name(s) of People in Home Patient lives with parents.     Assessment completed by patient and parents at bedside. Patient is independent with ADLs.No interest in home health services at this time. Patient and family are requesting set up with  a PCP. CM has set appointment  and put information on follow up paperwork.

## 2023-10-05 VITALS
TEMPERATURE: 98 F | BODY MASS INDEX: 28.67 KG/M2 | HEIGHT: 69 IN | WEIGHT: 193.56 LBS | HEART RATE: 75 BPM | RESPIRATION RATE: 18 BRPM | SYSTOLIC BLOOD PRESSURE: 114 MMHG | DIASTOLIC BLOOD PRESSURE: 74 MMHG | OXYGEN SATURATION: 91 %

## 2023-10-05 DIAGNOSIS — Q23.4 HLHS (HYPOPLASTIC LEFT HEART SYNDROME): Primary | ICD-10-CM

## 2023-10-05 LAB
ALBUMIN SERPL-MCNC: 4.1 G/DL (ref 3.5–5)
ALBUMIN/GLOB SERPL: 2 RATIO (ref 1.1–2)
ALP SERPL-CCNC: 66 UNIT/L (ref 40–150)
ALT SERPL-CCNC: 33 UNIT/L (ref 0–55)
AST SERPL-CCNC: 18 UNIT/L (ref 5–34)
BILIRUB SERPL-MCNC: 0.9 MG/DL
BUN SERPL-MCNC: 11 MG/DL (ref 7–18.7)
CALCIUM SERPL-MCNC: 8.8 MG/DL (ref 8.4–10.2)
CHLORIDE SERPL-SCNC: 109 MMOL/L (ref 98–107)
CO2 SERPL-SCNC: 21 MMOL/L (ref 22–29)
CREAT SERPL-MCNC: 0.67 MG/DL (ref 0.55–1.02)
GFR SERPLBLD CREATININE-BSD FMLA CKD-EPI: >60 MLS/MIN/1.73/M2
GLOBULIN SER-MCNC: 2.1 GM/DL (ref 2.4–3.5)
GLUCOSE SERPL-MCNC: 81 MG/DL (ref 74–100)
POTASSIUM SERPL-SCNC: 3.7 MMOL/L (ref 3.5–5.1)
PROT SERPL-MCNC: 6.2 GM/DL (ref 6.4–8.3)
SODIUM SERPL-SCNC: 139 MMOL/L (ref 136–145)

## 2023-10-05 PROCEDURE — 25000003 PHARM REV CODE 250: Performed by: INTERNAL MEDICINE

## 2023-10-05 PROCEDURE — 97161 PT EVAL LOW COMPLEX 20 MIN: CPT

## 2023-10-05 PROCEDURE — 80053 COMPREHEN METABOLIC PANEL: CPT | Performed by: INTERNAL MEDICINE

## 2023-10-05 PROCEDURE — 97165 OT EVAL LOW COMPLEX 30 MIN: CPT

## 2023-10-05 RX ADMIN — SPIRONOLACTONE 25 MG: 25 TABLET ORAL at 06:10

## 2023-10-05 RX ADMIN — FUROSEMIDE 20 MG: 20 TABLET ORAL at 06:10

## 2023-10-05 RX ADMIN — SERTRALINE HYDROCHLORIDE 50 MG: 50 TABLET ORAL at 06:10

## 2023-10-05 RX ADMIN — ASPIRIN 325 MG: 325 TABLET, COATED ORAL at 06:10

## 2023-10-05 RX ADMIN — SILDENAFIL 20 MG: 20 TABLET ORAL at 06:10

## 2023-10-05 NOTE — PT/OT/SLP EVAL
Physical Therapy Evaluation and Discharge Note    Patient Name:  Jil Lennon   MRN:  [id number]    Recommendations:     Discharge Recommendations: home  Discharge Equipment Recommendations: none   Barriers to discharge: Ongoing medical needs    Assessment:     Jil Lennon is a 23 y.o. female admitted with a medical diagnosis of Right facial droop, acute, possible CVA and Leukocytosis without other systemic signs of infection. Diagnotic cerebral angiogram on 10/04/2023, no evidence of right MCA stenosis and concern for right basal ganglia AVM was found. At this time, patient is functioning at their prior level of function and does not require further acute PT services. Pt found ambulating in room upon arrival. Pt reported of no px. Pt amb about 500ft independently with no AD demo steady step through gait pattern, upright trunk, steady arm swing, and no LOB noted. Pt displayed no safety or mobility concerns. Safe to d/c home.     Recent Surgery: * No surgery found *      Plan:     During this hospitalization, patient does not require further acute PT services.  Please re-consult if situation changes.      Subjective     Chief Complaint: none  Patient/Family Comments/goals: get back to work   Pain/Comfort:  Pain Rating 1: 0/10    Patients cultural, spiritual, Anglican conflicts given the current situation: no    Living Environment:  Pt lives with her mother in a 2 story home with no steps upon entry. Bedrooms on first floor. Pt is a .   Prior to admission, patients level of function was Independent.  Equipment used at home: none.  DME owned (not currently used):  unsure .  Upon discharge, patient will have assistance from mother.    Objective:     Communicated with NSG prior to session.  Patient found ambulatory in room  upon PT entry to room.    General Precautions: Standard,  BP <160    Respiratory Status: Room air  Blood Pressure: 127/73    Exams:  BLE ROM: WNL  BLE Strength:  WNL    Functional Mobility:  Transfers:   Stand to sit: Independent   Gait: Pt able about 500ft Independently with no AD demo steady step through gait pattern, upright trunk, steady arm swing, and no LOB noted.    Treatment and Education:      Patient provided with verbal education education regarding mobility and POC.  Understanding was verbalized.     Patient left sitting edge of bed with call button in reach.    GOALS:   Multidisciplinary Problems       Physical Therapy Goals       Not on file                    History:     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    Chylothorax     Congenital absence of pulmonary artery     to [facility]    Dyspnea     Encounter for blood transfusion     Fracture of wrist     x4    General anesthetics causing adverse effect in therapeutic use     was mean as a child when waking up after tonsillectomy    Hypoplastic left heart     Liver disease     enlarged liver    Obstructive sleep apnea     resolved by T&A    Obstructive sleep apnea (adult) (pediatric)     Post-Fontan protein-losing enteropathy     Stroke     mother states possibly had stroke at 4 y/o    Tonsillar and adenoid hypertrophy        Past Surgical History:   Procedure Laterality Date    BIDIRECTIONAL JOSE W/ ATRIAL SEPTECTOMY      [facility]    CARDIAC SURGERY      CATHETER REFENESTRATION  1/11/2005     [facility]    COARCTATION STENT  3/9/11    COLONOSCOPY N/A 5/27/2021    Procedure: COLONOSCOPY;  Surgeon: Benigno Bowers MD;  Location: [facility] ENDO ([address] [place] [address]);  Service: Endoscopy;  Laterality: N/A;    COMBINED RIGHT AND RETROGRADE LEFT HEART CATHETERIZATION FOR CONGENITAL HEART DEFECT N/A 3/7/2019    Procedure: CATHETERIZATION, HEART, COMBINED RIGHT AND RETROGRADE LEFT, FOR CONGENITAL HEART DEFECT;  Surgeon: Jimi Pollard Jr., MD;  Location: [facility] CATH LAB;  Service: Cardiology;  Laterality: N/A;  ped heart    ESOPHAGOGASTRODUODENOSCOPY N/A  5/27/2021    Procedure: EGD (ESOPHAGOGASTRODUODENOSCOPY);  Surgeon: Benigno Bowers MD;  Location: [facility] ([address] [place] [address]);  Service: Endoscopy;  Laterality: N/A;  Patient will need pediatric heart anesthesiologist due to history of Fontan     mother states having formed stool         COVID test at [facility] on 5/24-GT    FONTAN PROCEDURE, EXTRACARDIAC  9/27/2004    [facility]    LPA     RE CONSTRUCTION      [facility]    LPA STENT  2/26/.2009    KIRSTEN damian/ mitzi  age 3 days     done at [facility]    TONSILLECTOMY, ADENOIDECTOMY  11/2011    WISDOM TOOTH EXTRACTION         Time Tracking:     PT Received On: 10/05/23  PT Start Time: 1108     PT Stop Time: 1114  PT Total Time (min): 6 min     Billable Minutes: Evaluation low      10/05/2023

## 2023-10-05 NOTE — DISCHARGE SUMMARY
[facility] Medicine Discharge Summary    Admit Date: 10/3/2023  Discharge Date and Time: 10/5/638344:56 PM  Admitting Physician:  Team  Discharging Physician: Dustin Sharif MD.  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  She came in as a Code Fast and had an NIH of 1 with a noticeable facial droop.  CT head was negative and CTA of the head and neck showed a suspected focal stenosis of the right M2 branch.  Stroke Neurology and Interventional Neurology were both involved and aware of imaging results with final recommendations being admit on typical stroke workup protocol, and administer aspirin.  No thrombolytics or thrombectomy is were recommended.  Hospitalist has been consulted for admission.  Stroke protocol initiated   Neuro checks q.4  CT head without contrast shows no acute intracranial abnormality  CTA head and neck showed no large vessel occlusion, suspected focal stenosis of a right M2 branch  MRI brain without contrast shows small focus of 7 mm area of FLAIR hyperintensity in the right periventricular white matter without diffusion restriction.  This is nonspecific, can be seen in the setting of chronic small-vessel ischemic disease versus demyelinating disease  MRI brain with contrast--> no abnormal intracranial enhancement, newly evident  right basal ganglia edema, nonspecific  Cerebral angio -->  no evidence of right MCA stenosis; concern for right basal ganglia AVM  Chart reviewed, had pediatric echo done 7/17, report not available to us  Echocardiogram- limited study with marked poor acoustic window.  Nondiagnostic.  Study.  Recommend TRAE or outpatient cardiac MRI with FUP with a congenital heart disease specialist  Bilateral carotid ultrasound shows no focal stenotic lesions  Neurology Dr. Medrano on board, case personally discussed with him this morning given patient's mother requested he speaks with patient's primary pediatric cardiologist Dr. Jimi Pollard () as cardiologist is recommending Eliquis b.i.d..  Dr. Preciado tried calling Dr Pollard but unsuccessful given both have busy schedule.  Mitchell has cleared the patient for discharge on aspirin for now  EEG is pending, if positive, Neurology will call in the medications  Encouraged against vaping    Pt was seen and examined on the day of discharge  Vitals:  VITAL SIGNS: 24 HRS MIN & MAX LAST   Temp  Min: 97.9 °F (36.6 °C)  Max: 98.2 °F (36.8 °C) 98.2 °F (36.8 °C)   BP  Min: 114/74  Max: 146/84 114/74   Pulse  Min: 67  Max: 78  75   Resp  Min: 18  Max: 18 18   SpO2  Min: 88 %  Max: 95 % (!) 91 %       Physical Exam:  General: In no acute distress, afebrile  Chest: Clear to auscultation bilaterally  Heart: RRR, +S1, S2, no appreciable murmur  Abdomen: Soft, nontender, BS +  MSK: Warm, no lower extremity edema, no clubbing or cyanosis  Neurologic: Alert and oriented x4, Cranial nerve II-XII intact, Strength 5/5 in all 4 extremities, np focal neurologic deficits, symmetric muscles of facial expressions     Recent Labs   Lab 10/03/23  1609 10/03/23  1712 10/04/23  1315   WBC  --  17.63* 12.42*   RBC  --  5.25 5.46*   HGB  --  16.6* 17.3*   HCT 52 46.9 49.2*   MCV  --  89.3 90.1   MCH  --  31.6* 31.7*   MCHC  --  35.4 35.2   RDW  --  12.7 12.7   PLT  --  204 198   MPV  --  11.4* 10.9*        Recent Labs   Lab 10/03/23  1803 10/04/23  1314 10/05/23  0438    139 139   K 3.9 3.9 3.7   CO2 19* 20* 21*   BUN 16.2 14.7 11.0   CREATININE 0.67 0.71 0.67   CALCIUM 9.5 9.7 8.8   MG  --  1.90  --    ALBUMIN 4.5 4.6 4.1   ALKPHOS 71 69 66   ALT 26 37 33   AST 14 24 18   BILITOT 0.6 0.9 0.9        Microbiology Results (last 7 days)       ** No results found for the last 168 hours. **             IR Angiogram Carotid Cerebral Bilateral  See OP Notes for results.     IMPRESSION: See OP Notes for results.     This procedure was auto-finalized by: Virtual Radiologist  Echo  Limited study with markedly poor acoustic windows. This study is   non-diagnostic. The patient reportedly has h/o congenital heart disease. I   strongly recommend a TRAE or an outpatient cardiac MRI with fup with a   congenital heart disease specialist.  MRI Brain With Contrast  Narrative: EXAMINATION:  MRI BRAIN WITH CONTRAST    CLINICAL HISTORY:  Stroke, follow up;    TECHNIQUE:  Postcontrast MR images of the brain were obtained.    COMPARISON:  MRI brain dated 10/03/2023    FINDINGS:  There is edema in the right basal ganglia, now more prominent than on the prior exam.  Additional smaller T2 hyperintensities in the right frontal white matter and right cerebellum are unchanged.  There is no abnormal parenchymal or leptomeningeal enhancement.  Impression: 1. No abnormal intracranial enhancement.  2. Newly evident right basal ganglia edema, nonspecific.    Electronically signed by: Ly Mendoza  Date:    10/04/2023  Time:    11:31         Medication List        CHANGE how you take these medications      sertraline 50 MG tablet  Commonly known as: ZOLOFT  What changed: Another medication with the same name was removed. Continue taking this medication, and follow the directions you see here.            CONTINUE taking these medications      aspirin 81 MG Chew     digoxin 125 mcg tablet  Commonly known as: LANOXIN  TAKE 1 TABLET BY MOUTH  EVERY DAY IN THE EVENING     enalapril 2.5 MG tablet  Commonly known as: VASOTEC  TAKE 1 TABLET BY MOUTH TWICE A DAY     furosemide 20 MG tablet  Commonly known as: LASIX  TAKE 1 TABLET (20 MG TOTAL) BY MOUTH 2 (TWO) TIMES A DAY.     sildenafil 20 mg Tab  Commonly known as: REVATIO  Take 1 tablet (20 mg total) by mouth 2 (two) times daily. (pt taking twice a day)     spironolactone 25 MG tablet  Commonly known as: ALDACTONE  TAKE 1 TABLET BY MOUTH TWICE A DAY     tretinoin 0.025 % gel  Commonly known as: RETIN-A     VRAYLAR 1.5 mg Cap  Generic drug: cariprazine               Explained in detail to the patient about the discharge plan, medications, and follow-up visits. Pt understands and agrees with the treatment plan  Discharge Disposition: Home or Self Care   Discharged Condition: stable  Diet- cardiac     Medications Per DC med rec  Activities as tolerated   Follow-up Information       Rl Medrano MD Follow up in 3 month(s).    Specialties: Neurology, Interventional Radiology  Why: Follow up with Dr. Medrano in clinic within 3 months of hospital discharge.  Contact information:  [address] [place] [address] Dr Rizo Barry  [facility] [address]  [phone]               Alberto Swain MD. Go on 10/11/2023.    Specialty: Family Medicine  Why: PCP appointment has been scheduled for 10/11/23 at 1pm.  Contact information:  [address] [place] [address]  [address]  [facility] [address]  [phone]               Jimi Pollard Jr., MD. Go on 10/9/2023.    Specialty: Pediatric Cardiology  Why: AS SCHEDULED  Contact information:  [address] [place] [address]  [phone]                           For further questions contact hospitalist office    Discharge time 34 minutes    For worsening symptoms, chest pain, shortness of breath, increased abdominal pain, high grade fever, stroke or stroke like symptoms, immediately go to the nearest Emergency Room or call 911 as soon as possible.      Dustin Sharif MD  Department of  [facility] Medicine   [facility]   10/05/2023 12:56 PM

## 2023-10-05 NOTE — PROGRESS NOTES
Received call from Dr Sharif regarding mother requesting to speak with pediatric cardiologist regarding utility of anticoagulation given the patient's cardiac history.     Attempted to speak with Dr Pollard however, he is in a surgery. I will attempt to connect with him at a later time when he is available. I called patient's mother and discussed with her that the suspicion for an embolic event is low given lack of evidence of symptoms of DVT or prolonged immobility. Also, the MRI and angiogram points towards a vascular malformation rather than an ischemic event. However, I cannot rule out an embolic etiology for her transient left facial droop. I explained that patient can be safely discharged today as she is going to see her cardiologist on Monday in [place]. I will discuss the findings of angiogram with Dr Pollard and reach out to Patient's mother if any changes are needed.     Patient's mother is eager to be discharged today and I discussed that we will follow up in clinic to discuss further management regarding the vascular malformation.     She expressed understanding and agreed to the plan.    The above was conveyed to Dr Sharif and stroke team.     Rl Medrano MD  Vascular and Interventional Neurology

## 2023-10-05 NOTE — PT/OT/SLP EVAL
Occupational Therapy   Evaluation and Discharge Note    Name: Jil Lennon  MRN: [id number]  Admitting Diagnosis: possible TIA left facial droop, slurred speech, HX of CVA, hypoplastic left heart syndrome  Recent Surgery: * No surgery found *      Recommendations:     Discharge Recommendations: home (with family)  Discharge Equipment Recommendations:    Barriers to discharge:  None    Assessment:     Jil Lennon is a 23 y.o. female with a medical diagnosis of possible TIA left facial droop, slurred speech, HX of CVA, hypoplastic left heart syndrome . On eval, patient presents with independence in ADLs/at baseline. Pt preparing for DC, able to shower MOD I with moms help. Recommend home with family. Skilled OT services are not warranted at this time.    Plan:     OT to sign off as acute OT services are not warranted at this time.  Please re-consult if situation changes during this hospitalization.    Plan of Care Reviewed with: patient, parent    Subjective     Chief Complaint: No complaints  Patient/Family Comments/goals: to go home    Occupational Profile:  Living Environment: Pt lives with her parents in a [facility] with no JULIAN. No DME needs at this time.  Previous level of function: IND  Roles and Routines: Hairstylist, daughter  Equipment Used at Home: none  Assistance upon Discharge: Parents    Pain/Comfort:  Pain Rating 1: 0/10    Patients cultural, spiritual, Christian conflicts given the current situation: no    Objective:     OT communicated with RN prior to session.      Patient was found ambulatory in room/sims with   upon OT entry to room.    General Precautions: Standard,    Orthopedic Precautions: N/A  Braces: N/A    Vital Signs: Respiratory Status: on room air      Functional Mobility/Transfers:  Patient completed Sit <> Stand Transfer with independence  with  no assistive device   Patient completed Toilet Transfer Step Transfer technique with independence with  no AD  Functional Mobility: Pt  ambulating at baseline with independence. Pt had just finished showering MOD I for IV management. Pt ambulated to bathroom and performed sit<>stand without and LOB and IND.    Activities of Daily Living:  Bathing: independence pt required assist from mom to manage IV while showering ind otherwise  Toileting: independence simulated toileting t performing at baseline IND.    Functional Cognition:  Intact  Sensation and coordination all intact as seen through opposition test without over/undershooting and finger to nose test x 5 trials each hand all intact.    Visual Perceptual Skills:  Intact    Upper Extremity Function:  Right Upper Extremity:   WFL  Gross RUE 5/5  Left Upper Extremity:  WFL  Gross LUE 5/5    Balance:   Intact    Therapeutic Positioning  Risk for acquired pressure injuries is decreased due to continence and ability to mobilize independently .    OT interventions performed during the course of today's session in an effort to prevent and/or reduce acquired pressure injuries:   Education was provided on benefits of and recommendations for therapeutic positioning    Skin assessment:  Pt up and ambulating well, getting ready for DC, skin had no prior integrity issues so it was not tested.       OT recommendations for therapeutic positioning throughout hospitalization:   Follow [facility] Pressure Injury Prevention Protocol        Patient Education:  Patient and parent/s were provided with verbal education education regarding OT role/goals/POC.  Understanding was verbalized.     Patient left up in chair with  RN notified and mother present brushing her hair in the chair    History:     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    Chylothorax     Congenital absence of pulmonary artery     to [place]    Dyspnea     Encounter for blood transfusion     Fracture of wrist     x4    General anesthetics causing adverse effect in therapeutic  use     was mean as a child when waking up after tonsillectomy    Hypoplastic left heart     Liver disease     enlarged liver    Obstructive sleep apnea     resolved by T&A    Obstructive sleep apnea (adult) (pediatric)     Post-Fontan protein-losing enteropathy     Stroke     mother states possibly had stroke at 4 y/o    Tonsillar and adenoid hypertrophy          Past Surgical History:   Procedure Laterality Date    BIDIRECTIONAL JOSE W/ ATRIAL SEPTECTOMY      [facility]    CARDIAC SURGERY      CATHETER REFENESTRATION  1/11/2005     OF    COARCTATION STENT  3/9/11    COLONOSCOPY N/A 5/27/2021    Procedure: COLONOSCOPY;  Surgeon: Benigno Bowers MD;  Location: [facility] ENDO (2ND FLR);  Service: Endoscopy;  Laterality: N/A;    COMBINED RIGHT AND RETROGRADE LEFT HEART CATHETERIZATION FOR CONGENITAL HEART DEFECT N/A 3/7/2019    Procedure: CATHETERIZATION, HEART, COMBINED RIGHT AND RETROGRADE LEFT, FOR CONGENITAL HEART DEFECT;  Surgeon: Jimi Pollard Jr., MD;  Location: [facility] CATH LAB;  Service: Cardiology;  Laterality: N/A;  ped heart    ESOPHAGOGASTRODUODENOSCOPY N/A 5/27/2021    Procedure: EGD (ESOPHAGOGASTRODUODENOSCOPY);  Surgeon: Benigno Bowers MD;  Location: [facility] ENDO (2ND FLR);  Service: Endoscopy;  Laterality: N/A;  Patient will need pediatric heart anesthesiologist due to history of Fontan     mother states having formed stool         COVID test at [facility] on 5/24-GT    FONTAN PROCEDURE, EXTRACARDIAC  9/27/2004    [facility]    LPA     RE CONSTRUCTION      [facility]    LPA STENT  2/26/.2009    OF    nati/ mitzi  age 3 days     done at [facility]    TONSILLECTOMY, ADENOIDECTOMY  11/2011    WISDOM TOOTH EXTRACTION         Time Tracking:     OT Date of Treatment:    OT Start Time: 1051  OT Stop Time: 1059  OT Total Time (min): 8 min    Billable Minutes:Evaluation LOW    10/5/2023

## 2023-10-06 NOTE — PROGRESS NOTES
[facility]                       [address] [place] [address]    PATIENT NAME:       MIRANDA ENCARNACION  YOB: 2000  CSN:                [id number]   MRN:                [id number]  ADMIT DATE:         10/03/2023 16:30:00  PHYSICIAN:          Sloane Ledezma MD                           TESTING    DATE OF SERVICE:      TYPE OF STUDY:  EEG.    REASON FOR STUDY:  Study was done for seizure workup and confusion.    DESCRIPTION:  This electroencephalogram was done using 10/20 systems, using 21   channels.  The background activity is consistent of 9 hertz moderate amplitude.    Stage II sleep was noted with sleep spindles.  Photic stimulation elicited   normal responses.    CONCLUSION:  This is a normal awake, drowsy, and sleep EEG.  No evidence of any   epileptiform discharges.  Clinical correlation suggested.        ______________________________  MD NATALIE Gallardo/JENNA  DD:  10/05/2023  Time:  08:19PM  DT:  10/05/2023  Time:  11:25PM  Job #:  [id number]       TESTING

## 2023-10-09 ENCOUNTER — PATIENT MESSAGE (OUTPATIENT)
Dept: PEDIATRIC CARDIOLOGY | Facility: CLINIC | Age: 23
End: 2023-10-09

## 2023-10-09 ENCOUNTER — HOSPITAL ENCOUNTER (OUTPATIENT)
Dept: PEDIATRIC CARDIOLOGY | Facility: HOSPITAL | Age: 23
Discharge: HOME | End: 2023-10-09
Attending: PEDIATRICS
Payer: COMMERCIAL

## 2023-10-09 ENCOUNTER — OFFICE VISIT (OUTPATIENT)
Dept: PEDIATRIC CARDIOLOGY | Facility: CLINIC | Age: 23
End: 2023-10-09
Payer: COMMERCIAL

## 2023-10-09 ENCOUNTER — CLINICAL SUPPORT (OUTPATIENT)
Dept: PEDIATRIC CARDIOLOGY | Facility: CLINIC | Age: 23
End: 2023-10-09
Payer: COMMERCIAL

## 2023-10-09 VITALS
SYSTOLIC BLOOD PRESSURE: 136 MMHG | BODY MASS INDEX: 28.73 KG/M2 | HEIGHT: 69 IN | OXYGEN SATURATION: 88 % | WEIGHT: 194 LBS | HEART RATE: 94 BPM | DIASTOLIC BLOOD PRESSURE: 76 MMHG

## 2023-10-09 DIAGNOSIS — Q23.4 HLHS (HYPOPLASTIC LEFT HEART SYNDROME): ICD-10-CM

## 2023-10-09 DIAGNOSIS — Z98.890 S/P FONTAN PROCEDURE: ICD-10-CM

## 2023-10-09 DIAGNOSIS — R23.0 CYANOSIS: ICD-10-CM

## 2023-10-09 DIAGNOSIS — G45.9 TIA (TRANSIENT ISCHEMIC ATTACK): ICD-10-CM

## 2023-10-09 DIAGNOSIS — Q27.30 AVM (ARTERIOVENOUS MALFORMATION): ICD-10-CM

## 2023-10-09 DIAGNOSIS — Z98.890 PERSONAL HISTORY OF SURGERY TO HEART AND GREAT VESSELS, PRESENTING HAZARDS TO HEALTH: ICD-10-CM

## 2023-10-09 DIAGNOSIS — Q24.9 CONGENITAL MALFORMATION OF HEART, UNSPECIFIED: Primary | ICD-10-CM

## 2023-10-09 DIAGNOSIS — G45.9 TIA (TRANSIENT ISCHEMIC ATTACK): Primary | ICD-10-CM

## 2023-10-09 LAB — BSA FOR ECHO PROCEDURE: 2.06 M2

## 2023-10-09 PROCEDURE — 99999 PR PBB SHADOW E&M-EST. PATIENT-LVL III: CPT | Mod: PBBFAC,,, | Performed by: PEDIATRICS

## 2023-10-09 PROCEDURE — 93320 DOPPLER ECHO COMPLETE: CPT | Mod: 26,,, | Performed by: STUDENT IN AN ORGANIZED HEALTH CARE EDUCATION/TRAINING PROGRAM

## 2023-10-09 PROCEDURE — 99999 PR PBB SHADOW E&M-EST. PATIENT-LVL III: ICD-10-PCS | Mod: PBBFAC,,, | Performed by: PEDIATRICS

## 2023-10-09 PROCEDURE — 99999 PR PBB SHADOW E&M-EST. PATIENT-LVL I: ICD-10-PCS | Mod: PBBFAC,,,

## 2023-10-09 PROCEDURE — 99215 OFFICE O/P EST HI 40 MIN: CPT | Mod: 25,S$GLB,, | Performed by: PEDIATRICS

## 2023-10-09 PROCEDURE — 93303 PEDIATRIC ECHO (CUPID ONLY): ICD-10-PCS | Mod: 26,,, | Performed by: STUDENT IN AN ORGANIZED HEALTH CARE EDUCATION/TRAINING PROGRAM

## 2023-10-09 PROCEDURE — 93325 DOPPLER ECHO COLOR FLOW MAPG: CPT

## 2023-10-09 PROCEDURE — 99999 PR PBB SHADOW E&M-EST. PATIENT-LVL I: CPT | Mod: PBBFAC,,,

## 2023-10-09 PROCEDURE — 93244 CV 3-14 DAY PEDIATRIC HOLTER MONITOR (CUPID ONLY): ICD-10-PCS | Mod: ,,, | Performed by: PEDIATRICS

## 2023-10-09 PROCEDURE — 93244 EXT ECG>48HR<7D REV&INTERPJ: CPT | Mod: ,,, | Performed by: PEDIATRICS

## 2023-10-09 PROCEDURE — 93320 PEDIATRIC ECHO (CUPID ONLY): ICD-10-PCS | Mod: 26,,, | Performed by: STUDENT IN AN ORGANIZED HEALTH CARE EDUCATION/TRAINING PROGRAM

## 2023-10-09 PROCEDURE — 93303 ECHO TRANSTHORACIC: CPT | Mod: 26,,, | Performed by: STUDENT IN AN ORGANIZED HEALTH CARE EDUCATION/TRAINING PROGRAM

## 2023-10-09 PROCEDURE — 93325 DOPPLER ECHO COLOR FLOW MAPG: CPT | Mod: 26,,, | Performed by: STUDENT IN AN ORGANIZED HEALTH CARE EDUCATION/TRAINING PROGRAM

## 2023-10-09 PROCEDURE — 93242 EXT ECG>48HR<7D RECORDING: CPT

## 2023-10-09 PROCEDURE — 99215 PR OFFICE/OUTPT VISIT, EST, LEVL V, 40-54 MIN: ICD-10-PCS | Mod: 25,S$GLB,, | Performed by: PEDIATRICS

## 2023-10-09 PROCEDURE — 93325 PEDIATRIC ECHO (CUPID ONLY): ICD-10-PCS | Mod: 26,,, | Performed by: STUDENT IN AN ORGANIZED HEALTH CARE EDUCATION/TRAINING PROGRAM

## 2023-10-09 PROCEDURE — 93000 ELECTROCARDIOGRAM COMPLETE: CPT | Mod: S$GLB,,, | Performed by: STUDENT IN AN ORGANIZED HEALTH CARE EDUCATION/TRAINING PROGRAM

## 2023-10-09 PROCEDURE — 93000 EKG 12-LEAD PEDIATRIC: ICD-10-PCS | Mod: S$GLB,,, | Performed by: STUDENT IN AN ORGANIZED HEALTH CARE EDUCATION/TRAINING PROGRAM

## 2023-10-09 NOTE — PROGRESS NOTES
Subjective:    Patient ID:  Jil Lennon is a 23 y.o. female who presents for follow up after recent admission.   I am seeing her somewhat emergently due to her recent neurologic event.  She is typically followed by Dr. Pollard.  For additional information regarding the recent admission, please see below.  She was at work (she is a hairdresser).  She was having lots of issues with acid reflux that day.  She was also having some palpitations.  She looked in the mirror and noticed that she had a facial droop.  She tells me it was left-sided although I saw right-sided noted in the discharge summary.  She spoke to her mother about this, and then called EMS.  The facial droop lasted for about a minute.  She also had some difficulty talking associated with this that has gradually improved.  She had a very extensive workup in the hospital.  Since discharge, she has done well.  She continues to have brief intermittent headaches.  The headaches are frontal and bilateral.  She has had some dizziness.  She is definitely felt more anxious.  Her left leg perhaps feels a little bit weak although she has no trouble walking.  No fever at all through any of this.    She was admitted from October 3, 2023 until October 5, 2023 at [facility].  She presented with an acute right facial droop with slurred speech.  She presented to the emergency room 30 minutes after developing the acute right facial droop that was witnessed by a friend.  Imaging work up included:  CT head without contrast shows no acute intracranial abnormality  CTA head and neck showed no large vessel occlusion, suspected focal stenosis of a right M2 branch  MRI brain without contrast shows small focus of 7 mm area of FLAIR hyperintensity in the right periventricular white matter without diffusion restriction.  This is nonspecific, can be seen in the setting of chronic small-vessel ischemic disease versus demyelinating disease  MRI brain with contrast--> no  abnormal intracranial enhancement, newly evident right basal ganglia edema, nonspecific  Cerebral angio -->  no evidence of right MCA stenosis; concern for right basal ganglia AVM  Bilateral carotid ultrasound shows no focal stenotic lesions  Her neurologist also told me that her EEG was normal.    As per the discharge summary:  Neurology Dr. Medrano on board, case personally discussed with him this morning given patient's mother requested he speaks with patient's primary pediatric cardiologist Dr. Jimi Pollard () as cardiologist is recommending Eliquis b.i.d..  Dr. Preciado tried calling Dr Pollard but unsuccessful given both have busy schedule.  Mitchell has cleared the patient for discharge on aspirin for now (of note, she has been on aspirin for years).  EEG is pending, if positive, Neurology will call in the medications  Encouraged against vaping    As per Dr. Medrano's telephone note:  I called patient's mother and discussed with her that the suspicion for an embolic event is low given lack of evidence of symptoms of DVT or prolonged immobility. Also, the MRI and angiogram points towards a vascular malformation rather than an ischemic event. However, I cannot rule out an embolic etiology for her transient left facial droop. I explained that patient can be safely discharged today as she is going to see her cardiologist on Monday in [place].    I reviewed labs from that hospitalization.  CBC with white blood cell count 12.4 which is mildly elevated.  75% granulocytes.  Hemoglobin 17.3, MCV normal, platelet count a 198.  INR 1.1.  PTT normal at 27.  Comprehensive metabolic panel normal except for a very mildly reduced total protein at 6.2.  Albumin normal at 4.1.  Creatinine 0.67.  AST and ALT 18 and 13.  Bilirubin 0.9.  Total cholesterol 138, HDL 37, LDL 88, triglycerides 64.  Cardiac enzymes normal.  Hemoglobin A1c normal.    Most recent Holter July 2023:   Sinus rhythm with intraventricular conduction  delay throughout.  Normal HR range.  Patient-triggered events (2) correlate to:  sinus rhythm (1)   sinus rhythm with PVCs (1).  No significant ectopy burden.    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 She is doing very well overall now from a cardiac standpoint but has significant anxiety and eating disorder.     She has a history of abdominal complaints with small bowel bacterial overgrowth syndrome treated with antibiotics. She has an IUD.    She has not had recent lower extremity swelling.      Lluvia has had pleural effusions in the past and has very small diffuse pulmonary micro-AVMs, mild cyanosis and mild exercise intolerance. The fenestration remains of moderate size.The pulmonary micro-AVMs were less obvious on the recent cath and her pulmonary veins were fully saturated.     Several family members have thyroid problems.     Latest hepatology evaluation/follow up was unremarkable.     The review of systems is as noted above. It is otherwise negative for other symptoms related to the general, neurological, psychiatric, endocrine, gastrointestinal, genitourinary, respiratory, dermatologic, musculoskeletal, hematologic, and immunologic systems.      Current Outpatient Medications:     aspirin 81 MG Chew, Take 81 mg by mouth every evening., Disp: , Rfl:     digoxin (LANOXIN) 125 mcg tablet, TAKE 1 TABLET BY MOUTH EVERY DAY IN THE EVENING, Disp: 90 tablet, Rfl: 10    enalapril (VASOTEC) 2.5 MG tablet, TAKE 1 TABLET BY MOUTH TWICE A DAY, Disp: 60 tablet, Rfl: 9    furosemide (LASIX) 20 MG tablet, TAKE 1 TABLET (20 MG TOTAL) BY MOUTH 2 (TWO) TIMES A DAY., Disp: 60 tablet, Rfl: 10    sertraline (ZOLOFT) 50 MG tablet, Take 100 mg by mouth once daily., Disp: , Rfl:     sildenafil (REVATIO) 20 mg Tab, Take 1 tablet (20 mg total) by mouth 2 (two) times daily. (pt taking twice a  "day), Disp: 60 tablet, Rfl: 6    spironolactone (ALDACTONE) 25 MG tablet, TAKE 1 TABLET BY MOUTH TWICE A DAY, Disp: 60 tablet, Rfl: 9    tretinoin (RETIN-A) 0.025 % gel, Apply topically every evening., Disp: , Rfl:     VRAYLAR 1.5 mg Cap, Take 1 capsule by mouth once daily., Disp: , Rfl:       Vitals:    10/09/23 1115 10/09/23 1116   BP: (!) 148/76 136/76   BP Location: Right arm Left leg   Patient Position: Sitting Lying   BP Method: Large (Automatic) Large (Automatic)   Pulse: 94    SpO2: (!) 88%    Weight: 88 kg (194 lb 0.1 oz)    Height: 5' 8.5" (1.74 m)           Objective:    Physical Exam  Constitutional:       General: She is not in acute distress.     Appearance: She is well-developed. She is not diaphoretic.      Comments: Mild cyanosis.   HENT:      Head: Normocephalic and atraumatic.      Right Ear: External ear normal.      Left Ear: External ear normal.      Nose: Nose normal.      Mouth/Throat:      Pharynx: No oropharyngeal exudate.   Eyes:      General: No scleral icterus.        Right eye: No discharge.         Left eye: No discharge.      Conjunctiva/sclera: Conjunctivae normal.      Pupils: Pupils are equal, round, and reactive to light.   Neck:      Thyroid: No thyromegaly.      Vascular: No JVD.      Trachea: No tracheal deviation.   Cardiovascular:      Rate and Rhythm: Normal rate.      Pulses: Intact distal pulses.           Radial pulses are 2+ on the right side.        Femoral pulses are 2+ on the right side.     Heart sounds: S1 normal. Murmur heard.      High-pitched blowing holosystolic murmur is present with a grade of 1/6 at the lower left sternal border. Single S2      No friction rub. No gallop.   Pulmonary:      Effort: Pulmonary effort is normal. No respiratory distress.      Breath sounds: Normal breath sounds. No stridor. No wheezing or rales.   Chest:      Chest wall: No tenderness.   Abdominal:      General: Bowel sounds are normal. There is no distension.      Palpations: " Abdomen is soft. There is no mass.      Tenderness: There is no abdominal tenderness. There is no guarding or rebound.   Musculoskeletal:         General: No tenderness. Normal range of motion.      Cervical back: Normal range of motion and neck supple.   Lymphadenopathy:      Cervical: No cervical adenopathy.   Skin:     General: Skin is warm and dry.      Coloration: Skin is not pale.      Findings: No erythema or rash.   Neurological:      Mental Status: She is alert and oriented to person, place, and time.      Cranial Nerves: No cranial nerve deficit.      Motor: No abnormal muscle tone.      Coordination: Coordination normal.   Psychiatric:         Behavior: Behavior normal.         Thought Content: Thought content normal.         Judgment: Judgment normal.     EKG performed in clinic today reveals normal sinus rhythm with rate 92 beats per minute.  Right axis deviation.  Nonspecific ST changes noted in the inferior and lateral leads.  Q-waves noted in V1 and V2.  No significant change compared to EKG from July 17, 2023.      The official echo report is pending.  I reviewed that study in detail.  My interpretation is as follows:  1. No change noted compared to July echo   2. Good right ventricular function, likely mild tricuspid insufficiency  3. Stent noted in proximal descending aorta with unchanged mild flow acceleration to about 2.4 m/sec.  4. Stent noted in left pulmonary artery without obvious obstruction, stent noted in the Fontan conduit without obvious significant obstruction  5. No evidence of thrombus on this study although 1 could definitely be missed.      Cath 2019:  IMPRESSION:  1.  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  2.  Mildly elevated CVp (14 mmHg). Low transpulmonary gradient and PVR.  3.  Widely patent LPA and descending aorta stents.  4.  Narrowed Fontan conduit (14 mm diameter) improved to 18 mm diameter with stent.  5.  Patent fenestration, mean gradient 4  mmHg, moderate right-to-left shunt.  6.  Trace lucero-aortic valve insufficiency.  7.  No significant arterial or venous collaterals.  8.  Absent left upper lobe pulmonary artery.  9.  CVp increased  to 18 mmHg and Qs decreased by 25% with temporary fenestration occlusion.        Last liver US 12/12/22    ECG: NSR, RVH  ECHO: Hypoplastic left heart syndrome s/p Fontan. LPA stent, coarctation stent.  No significant change from last echocardiogram.  Laminar flow with no obvious thrombus or obstruction in left innominate vein , right SVC, IVC , Fontan conduit, pulmonary  confluence, stented LPA, proximal RPA, Fontan anastomosis or Abel anastomosis.  LPA distal to stent with laminar flow by color doppler. .  Color doppler demonstrates Fontan fenestration with continuous flow to right atrium at peak velocity <1.5 m/sec. and mean  gradient <4.5 mmHg.  Unobstructed return of pulmonary venous return across large atrial communication to tricuspid valve  Dilated right ventricle, mild.  Thickened right ventricle free wall, moderate.  Qualitatively good systemic right ventricular systolic function, appears slightly improved.  Normal neoaortic valve velocity.  Mild neoaortic valve insufficiency.  Large ascending aorta post Corder.  Stent noted in descending aorta with peak velocity <2.4 m/sec. and trivial diastolic run off.  No pericardial effusion.    Assessment:       1. HLHS (hypoplastic left heart syndrome), s/p fenestrated Fontan   2. Aorta coarctation, relieved with stent    3. Pulmonary artery stenosis of central branch, relieved with stent    4. Diffuse pulmonary micro-AVMs (arteriovenous malformation)    5. 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 Post-Fontan protein-losing enteropathy, resolved    7. S/P Fontan procedure    8. S/P Fontan conduit stent   9. History of palpitations, quiescent   10. Anxiety/Depression   11.    Admission to [facility] October 2023 with left-sided facial droop - TIA, imaging  concerning for possible right basal ganglia atypical AVM.  Extensive evaluation there showed no evidence of embolic stroke.     Plan:       1. Three day Holter   2. Repeat blood work today  3. Arrange for cardiac MRI   4. Any recurrent neurologic symptoms should prompt immediate emergency room visit   5. Continue on aspirin for now   6. Has follow-up with Neurology (Dr. Medrano) in the near future with plans for repeat MRI and repeat cerebral angiogram within 3 months      Discussion:  From a cardiac standpoint, she looks unchanged in clinic today compared to 3 months ago when she last saw Dr. Pollard.  I do have significant concerns about the possibility of an embolic etiology for her recent TIA symptoms.  She is at very high risk for thromboembolic events given her Fontan related coagulation abnormalities and her right-to-left shunts.  She is at increased risk for arrhythmia that could predispose to thromboembolic events.  That said, she had an extensive workup at [facility] without evidence of an emboli.  It is certainly possible that she had a small emboli that resolved prior to imaging, but no stone was left on turned in her workup.  There is this concern about an AVM in the basal ganglia.  Today, I spoke with her neurologist, Dr. Medrano.  He was incredibly helpful.  He really does not think this was an embolic stroke although I certainly still have my doubts given her complex heart disease.  There are concerns of an increased risk of bleeding with a cerebral AVM like this, so there is potential risk to going from an aspirin to Eliquis or another DOAC.  My plan is as noted above.  We are going to keep her on aspirin for now.  If the AVM looks stable in 3 months, he thought it would be safer to switch her to a DOAC at that time.  I am going to get a cardiac MRI as well.  Although her stents will affect the quality of this study, we should be able to get a good image of her hypoplastic left ventricle to see  if there is any thrombus there.  I am also placing a Holter to look for atrial arrhythmias.  I would be quick to switch her anticoagulation if there were concerns with any of those tests.  I also sent her for repeat blood work today.    cc: Dr. Shine Thanki

## 2023-10-10 ENCOUNTER — PATIENT MESSAGE (OUTPATIENT)
Dept: PEDIATRIC CARDIOLOGY | Facility: CLINIC | Age: 23
End: 2023-10-10
Payer: COMMERCIAL

## 2023-10-10 ENCOUNTER — PATIENT MESSAGE (OUTPATIENT)
Dept: OPTOMETRY | Facility: CLINIC | Age: 23
End: 2023-10-10
Payer: COMMERCIAL

## 2023-10-16 ENCOUNTER — PATIENT MESSAGE (OUTPATIENT)
Dept: NEUROLOGY | Facility: CLINIC | Age: 23
End: 2023-10-16
Payer: COMMERCIAL

## 2023-10-16 ENCOUNTER — OFFICE VISIT (OUTPATIENT)
Dept: OPHTHALMOLOGY | Facility: CLINIC | Age: 23
End: 2023-10-16
Payer: COMMERCIAL

## 2023-10-16 DIAGNOSIS — G45.9 TIA (TRANSIENT ISCHEMIC ATTACK): ICD-10-CM

## 2023-10-16 DIAGNOSIS — H47.323 OPTIC DISC DRUSEN, BILATERAL: Primary | ICD-10-CM

## 2023-10-16 DIAGNOSIS — H52.13 MYOPIA OF BOTH EYES: ICD-10-CM

## 2023-10-16 PROCEDURE — 92133 PR COMPUTERIZED OPHTHALMIC IMAGING OPTIC NERVE: ICD-10-PCS | Mod: S$GLB,,, | Performed by: STUDENT IN AN ORGANIZED HEALTH CARE EDUCATION/TRAINING PROGRAM

## 2023-10-16 PROCEDURE — 99999 PR PBB SHADOW E&M-EST. PATIENT-LVL III: CPT | Mod: PBBFAC,,, | Performed by: STUDENT IN AN ORGANIZED HEALTH CARE EDUCATION/TRAINING PROGRAM

## 2023-10-16 PROCEDURE — 92004 PR EYE EXAM, NEW PATIENT,COMPREHESV: ICD-10-PCS | Mod: S$GLB,,, | Performed by: STUDENT IN AN ORGANIZED HEALTH CARE EDUCATION/TRAINING PROGRAM

## 2023-10-16 PROCEDURE — 92004 COMPRE OPH EXAM NEW PT 1/>: CPT | Mod: S$GLB,,, | Performed by: STUDENT IN AN ORGANIZED HEALTH CARE EDUCATION/TRAINING PROGRAM

## 2023-10-16 PROCEDURE — 92083 PR VISUAL FIELD EXAM,EXTENDED: ICD-10-PCS | Mod: S$GLB,,, | Performed by: STUDENT IN AN ORGANIZED HEALTH CARE EDUCATION/TRAINING PROGRAM

## 2023-10-16 PROCEDURE — 92083 EXTENDED VISUAL FIELD XM: CPT | Mod: S$GLB,,, | Performed by: STUDENT IN AN ORGANIZED HEALTH CARE EDUCATION/TRAINING PROGRAM

## 2023-10-16 PROCEDURE — 92133 CPTRZD OPH DX IMG PST SGM ON: CPT | Mod: S$GLB,,, | Performed by: STUDENT IN AN ORGANIZED HEALTH CARE EDUCATION/TRAINING PROGRAM

## 2023-10-16 PROCEDURE — 99999 PR PBB SHADOW E&M-EST. PATIENT-LVL III: ICD-10-PCS | Mod: PBBFAC,,, | Performed by: STUDENT IN AN ORGANIZED HEALTH CARE EDUCATION/TRAINING PROGRAM

## 2023-10-16 NOTE — ASSESSMENT & PLAN NOTE
Has history of documented small crowded nerves OU  On image search, I cannot see any evidence of history of B scan or ON autoflouresence to document present of B scan, however, lumpy bumpy otic nerve appearance today consistent with OP drusen  Unable to obtain B scan or FAF at satellite clinic location today    10/16/23 HVF: full  10/162/3 RNFL: thinning OU but likely secondary to poor segmentation from drusen      Plan:  RTC 6 months  Repeat HVF 24-2 GIULIANA Fast  B scan and fundus autoflouresence

## 2023-10-16 NOTE — PROGRESS NOTES
"HPI    23 yr old with history of complex congenital heart disease and past ocular   history of high myopia and "crowded optic discs" who presents for an eye   exam after recent admission for a TIA.   She was admitted to [facility] from 10/3 - 10/5 for with left-sided   facial droop. She was diagnosed with a TIA. MRI imaging was concerning for   a concerning for possible right basal ganglia atypical AVM.  Extensive   evaluation there showed no evidence of embolic stroke.  During hospitalization, she had headaches, but these have gradually   imporved since discharge. She reproted blurry vision during the headacehs,   but now believes vision is back to baseline. She denies diplopia.   Last edited by Raj Davies MD on 10/16/2023  3:28 PM.             24-2 HVF - SF  DONE-OU   Full OU       RELAXATION & FIXATION- GOOD-OU   COOPERATION- GOOD     P.POLE OCT   ONH- OU-DONE  Bilateral RNFL thinning due to poor segmentation from drusen        MRX   -7.00   -6.75       Assessment /Plan     For exam results, see Encounter Report.    Optic disc drusen, bilateral    Myopia of both eyes    TIA (transient ischemic attack)          Problem List Items Addressed This Visit          Neuro    TIA (transient ischemic attack)    Current Assessment & Plan     Recent TIA with facial droop and slurred speech that resolved (10/3/23)  Still being worked up with cardiology and Neurology on etiology    10/4/23 MRI Brain:  1. No abnormal intracranial enhancement.  2. Newly evident right basal ganglia edema, nonspecific.    10/16/23: Normal eye exam with no visual field defect            Ophtho    High Bilateral Myopia     Current Assessment & Plan     Wears CTL -- follows with Dr. Mcelroy         Optic disc drusen, bilateral - Primary    Current Assessment & Plan     Has history of documented small crowded nerves OU  On image search, I cannot see any evidence of history of B scan or ON autoflouresence to document present " of B scan, however, lumpy bumpy otic nerve appearance today consistent with OP drusen  Unable to obtain B scan or FAF at satellite clinic location today    10/16/23 HVF: full  10/162/3 RNFL: thinning OU but likely secondary to poor segmentation from drusen      Plan:  RTC 6 months  Repeat HVF 24-2 GIULIANA Fast  B scan and fundus autoflouresence           Raj Davies MD  Pediatric Ophthalmology and Adult Strabismus  [facility]

## 2023-10-16 NOTE — ASSESSMENT & PLAN NOTE
Recent TIA with facial droop and slurred speech that resolved (10/3/23)  Still being worked up with cardiology and Neurology on etiology    10/4/23 MRI Brain:  1. No abnormal intracranial enhancement.  2. Newly evident right basal ganglia edema, nonspecific.    10/16/23: Normal eye exam with no visual field defect

## 2023-10-17 LAB
LEFT CCA DIST DIAS: 6 CM/S
LEFT CCA DIST SYS: 61 CM/S
LEFT CCA PROX DIAS: 0 CM/S
LEFT CCA PROX SYS: 92 CM/S
LEFT ECA DIAS: 0 CM/S
LEFT ECA SYS: 88 CM/S
LEFT ICA DIST DIAS: 11 CM/S
LEFT ICA DIST SYS: 50 CM/S
LEFT ICA MID DIAS: 8 CM/S
LEFT ICA MID SYS: 48 CM/S
LEFT ICA PROX DIAS: 7 CM/S
LEFT ICA PROX SYS: 53 CM/S
LEFT VERTEBRAL DIAS: 0 CM/S
LEFT VERTEBRAL SYS: 36 CM/S
OHS CV CAROTID RIGHT ICA EDV HIGHEST: 11
OHS CV CAROTID ULTRASOUND LEFT ICA/CCA RATIO: 0.87
OHS CV CAROTID ULTRASOUND RIGHT ICA/CCA RATIO: 1.07
OHS CV PV CAROTID LEFT HIGHEST CCA: 92
OHS CV PV CAROTID LEFT HIGHEST ICA: 53
OHS CV PV CAROTID RIGHT HIGHEST CCA: 105
OHS CV PV CAROTID RIGHT HIGHEST ICA: 73
OHS CV US CAROTID LEFT HIGHEST EDV: 11
RIGHT CCA DIST DIAS: 2 CM/S
RIGHT CCA DIST SYS: 68 CM/S
RIGHT CCA PROX DIAS: 0 CM/S
RIGHT CCA PROX SYS: 105 CM/S
RIGHT ECA DIAS: 2 CM/S
RIGHT ECA SYS: 90 CM/S
RIGHT ICA DIST DIAS: 11 CM/S
RIGHT ICA DIST SYS: 30 CM/S
RIGHT ICA MID DIAS: 0 CM/S
RIGHT ICA MID SYS: 73 CM/S
RIGHT ICA PROX DIAS: 2 CM/S
RIGHT ICA PROX SYS: 49 CM/S
RIGHT VERTEBRAL DIAS: 11 CM/S
RIGHT VERTEBRAL SYS: 59 CM/S

## 2023-11-02 DIAGNOSIS — I27.20 PULMONARY HYPERTENSION: ICD-10-CM

## 2023-11-02 RX ORDER — SPIRONOLACTONE 25 MG/1
25 TABLET ORAL 2 TIMES DAILY
Qty: 60 TABLET | Refills: 9 | Status: SHIPPED | OUTPATIENT
Start: 2023-11-02

## 2023-11-02 RX ORDER — SILDENAFIL CITRATE 20 MG/1
20 TABLET ORAL 2 TIMES DAILY
Qty: 60 TABLET | Refills: 6 | Status: SHIPPED | OUTPATIENT
Start: 2023-11-02

## 2023-11-02 RX ORDER — FUROSEMIDE 20 MG/1
20 TABLET ORAL 2 TIMES DAILY
Qty: 60 TABLET | Refills: 10 | Status: SHIPPED | OUTPATIENT
Start: 2023-11-02

## 2023-11-02 RX ORDER — DIGOXIN 125 MCG
0.12 TABLET ORAL NIGHTLY
Qty: 90 TABLET | Refills: 10 | Status: SHIPPED | OUTPATIENT
Start: 2023-11-02

## 2023-11-03 ENCOUNTER — PATIENT MESSAGE (OUTPATIENT)
Dept: PEDIATRIC CARDIOLOGY | Facility: CLINIC | Age: 23
End: 2023-11-03
Payer: COMMERCIAL

## 2023-11-03 LAB
OHS CV EVENT MONITOR DAY: 3
OHS CV HOLTER HOOKUP DATE: NORMAL
OHS CV HOLTER HOOKUP TIME: NORMAL
OHS CV HOLTER LENGTH DECIMAL HOURS: 72
OHS CV HOLTER LENGTH HOURS: 0
OHS CV HOLTER LENGTH MINUTES: 0
OHS CV HOLTER SCAN DATE: NORMAL
OHS CV HOLTER SINUS AVERAGE HR: 94 BPM
OHS CV HOLTER SINUS MAX HR: 140 BPM
OHS CV HOLTER SINUS MIN HR: 64 BPM
OHS CV HOLTER STUDY END DATE: NORMAL
OHS CV HOLTER STUDY END TIME: NORMAL

## 2023-11-06 ENCOUNTER — PATIENT MESSAGE (OUTPATIENT)
Dept: PEDIATRIC CARDIOLOGY | Facility: CLINIC | Age: 23
End: 2023-11-06
Payer: COMMERCIAL

## 2023-11-25 NOTE — PATIENT INSTRUCTIONS
Modify activity and gentle stretching  Wear wrist splint as instructed  Tylenol or ibuprofen OTC as directed for pain  Follow-up with your primary care provider if symptoms worsen or persist as instructed  We will notify you of the final radiology x-ray report result  
0 (no pain/absence of nonverbal indicators of pain)

## 2023-11-30 ENCOUNTER — HOSPITAL ENCOUNTER (OUTPATIENT)
Dept: RADIOLOGY | Facility: HOSPITAL | Age: 23
Discharge: HOME | End: 2023-11-30
Attending: PEDIATRICS
Payer: COMMERCIAL

## 2023-11-30 DIAGNOSIS — Q23.4 HLHS (HYPOPLASTIC LEFT HEART SYNDROME): ICD-10-CM

## 2023-11-30 DIAGNOSIS — Z98.890 S/P FONTAN PROCEDURE: ICD-10-CM

## 2023-11-30 DIAGNOSIS — Q24.9 CONGENITAL MALFORMATION OF HEART, UNSPECIFIED: ICD-10-CM

## 2023-11-30 PROCEDURE — 75561 CARDIAC MRI FOR MORPH W/DYE: CPT | Mod: TC

## 2023-11-30 PROCEDURE — A9577 INJ MULTIHANCE: HCPCS | Performed by: PEDIATRICS

## 2023-11-30 PROCEDURE — 25500020 PHARM REV CODE 255: Performed by: PEDIATRICS

## 2023-11-30 PROCEDURE — 75561 CV CARDIAC MRI MORPHOLOGY (CUPID ONLY): ICD-10-PCS | Mod: 26,,, | Performed by: STUDENT IN AN ORGANIZED HEALTH CARE EDUCATION/TRAINING PROGRAM

## 2023-11-30 PROCEDURE — 75561 CARDIAC MRI FOR MORPH W/DYE: CPT | Mod: 26,,, | Performed by: STUDENT IN AN ORGANIZED HEALTH CARE EDUCATION/TRAINING PROGRAM

## 2023-11-30 RX ADMIN — GADOBENATE DIMEGLUMINE 40 ML: 529 INJECTION, SOLUTION INTRAVENOUS at 08:11

## 2023-12-04 ENCOUNTER — PATIENT MESSAGE (OUTPATIENT)
Dept: PEDIATRIC CARDIOLOGY | Facility: CLINIC | Age: 23
End: 2023-12-04
Payer: COMMERCIAL

## 2023-12-04 ENCOUNTER — TELEPHONE (OUTPATIENT)
Dept: PEDIATRIC CARDIOLOGY | Facility: CLINIC | Age: 23
End: 2023-12-04
Payer: COMMERCIAL

## 2023-12-19 ENCOUNTER — TELEPHONE (OUTPATIENT)
Dept: PEDIATRIC CARDIOLOGY | Facility: CLINIC | Age: 23
End: 2023-12-19
Payer: COMMERCIAL

## 2023-12-19 ENCOUNTER — PATIENT MESSAGE (OUTPATIENT)
Dept: PEDIATRIC CARDIOLOGY | Facility: CLINIC | Age: 23
End: 2023-12-19
Payer: COMMERCIAL

## 2023-12-19 NOTE — TELEPHONE ENCOUNTER
"Coordinated cardiac cath procedure with pt's mother for 3/15/24. Instructions sent via [facility].     ----- Message from Jimi Pollard Jr., MD sent at 12/7/2023  3:33 PM CST -----  Regarding: RE: MRI  Yes, please schedule for cath  Sedation, no TRAE  Bony    ----- Message -----  From: Bhavin White MD  Sent: 12/6/2023   3:36 PM CST  To: Jimi Pollard Jr., MD; #  Subject: RE: MRI                                          Great.  She is all for it.  Can you guys set her up for March or April?  She should be cleared by neuro by then.  ----- Message -----  From: Jimi Pollard Jr., MD  Sent: 12/6/2023   1:40 PM CST  To: Bhavin White MD; Atlu Krause"[facility]" Staff  Subject: RE: MRI                                          I agree  ----- Message -----  From: Bhavin White MD  Sent: 12/4/2023   3:47 PM CST  To: Jimi Pollard Jr., MD; #  Subject: MRI                                              I saw her after her stroke like episode last month while you were a way.  I spoke with her neurologist, Dr. Medrano, at the time.  He was incredibly helpful.  He did not think this was an embolic stroke although I certainly still have my doubts given her complex heart disease.  There were concerns of an increased risk of bleeding with a cerebral AVM like this, so there is potential risk to going from an aspirin to Eliquis or another DOAC.  If the AVM looks stable in 3 months (his plan to reimage around Feb), he thought it would be safer to switch her to a DOAC at that time.    I got a cardiac MRI that overall looked great but Yoni noted:  1. There are tiny tortuous collaterals vessels primarily in the region of the innominate artery, innominate vein and SVC. The collaterals appear to terminate in the right lung and possible right pulmonary veins. The right and left internal mammary arteries are dilated.  2. Phase contrast imaging demonstrates that 36% of aortic blood flow is lost in the form of collaterals and 39% of " pulmonary blood flow gained from collateral flow.  3. T2 MRCP lymphangiogram performed is consistent with type 3 lymphatic classification with extension into the mediastinum.    It's been over 4 years since cath.  What do you think about cathing her?  Seems safest since not emergent to wait until that follow up imaging of the head - agree?

## 2023-12-28 ENCOUNTER — CLINICAL SUPPORT (OUTPATIENT)
Dept: PEDIATRIC CARDIOLOGY | Facility: CLINIC | Age: 23
End: 2023-12-28
Attending: PEDIATRICS
Payer: COMMERCIAL

## 2023-12-28 DIAGNOSIS — K90.49 POST-FONTAN PROTEIN-LOSING ENTEROPATHY: ICD-10-CM

## 2023-12-28 DIAGNOSIS — Z98.890 S/P FONTAN PROCEDURE: ICD-10-CM

## 2023-12-28 DIAGNOSIS — Z98.890 POST-FONTAN PROTEIN-LOSING ENTEROPATHY: ICD-10-CM

## 2023-12-28 DIAGNOSIS — Q23.4 HLHS (HYPOPLASTIC LEFT HEART SYNDROME): ICD-10-CM

## 2023-12-28 DIAGNOSIS — Q25.6 PULMONARY ARTERY STENOSIS OF CENTRAL BRANCH: ICD-10-CM

## 2023-12-28 PROCEDURE — 93244 CV 3-14 DAY PEDIATRIC HOLTER MONITOR (CUPID ONLY): ICD-10-PCS | Mod: ,,, | Performed by: PEDIATRICS

## 2023-12-28 PROCEDURE — 93000 EKG 12-LEAD PEDIATRIC: ICD-10-PCS | Mod: S$GLB,,, | Performed by: STUDENT IN AN ORGANIZED HEALTH CARE EDUCATION/TRAINING PROGRAM

## 2023-12-28 PROCEDURE — 93242 CV 3-14 DAY PEDIATRIC HOLTER MONITOR (CUPID ONLY): ICD-10-PCS | Mod: ,,, | Performed by: PEDIATRICS

## 2023-12-28 PROCEDURE — 93000 ELECTROCARDIOGRAM COMPLETE: CPT | Mod: S$GLB,,, | Performed by: STUDENT IN AN ORGANIZED HEALTH CARE EDUCATION/TRAINING PROGRAM

## 2023-12-28 PROCEDURE — 93242 EXT ECG>48HR<7D RECORDING: CPT | Mod: ,,, | Performed by: PEDIATRICS

## 2023-12-28 PROCEDURE — 93244 EXT ECG>48HR<7D REV&INTERPJ: CPT | Mod: ,,, | Performed by: PEDIATRICS

## 2024-01-02 ENCOUNTER — PATIENT MESSAGE (OUTPATIENT)
Dept: PEDIATRIC GASTROENTEROLOGY | Facility: CLINIC | Age: 24
End: 2024-01-02
Payer: COMMERCIAL

## 2024-01-08 PROBLEM — G45.9 TIA (TRANSIENT ISCHEMIC ATTACK): Status: RESOLVED | Noted: 2023-10-03 | Resolved: 2024-01-08

## 2024-01-10 ENCOUNTER — PATIENT MESSAGE (OUTPATIENT)
Dept: PEDIATRIC CARDIOLOGY | Facility: CLINIC | Age: 24
End: 2024-01-10
Payer: COMMERCIAL

## 2024-01-10 LAB
OHS CV EVENT MONITOR DAY: 7
OHS CV HOLTER HOOKUP DATE: NORMAL
OHS CV HOLTER HOOKUP TIME: NORMAL
OHS CV HOLTER LENGTH DECIMAL HOURS: 173
OHS CV HOLTER LENGTH HOURS: 5
OHS CV HOLTER LENGTH MINUTES: 0
OHS CV HOLTER SCAN DATE: NORMAL
OHS CV HOLTER SINUS AVERAGE HR: 74 BPM
OHS CV HOLTER SINUS MAX HR: 125 BPM
OHS CV HOLTER SINUS MIN HR: 50 BPM
OHS CV HOLTER STUDY END DATE: NORMAL
OHS CV HOLTER STUDY END TIME: NORMAL

## 2024-01-11 ENCOUNTER — PATIENT MESSAGE (OUTPATIENT)
Dept: OPTOMETRY | Facility: CLINIC | Age: 24
End: 2024-01-11
Payer: COMMERCIAL

## 2024-01-16 ENCOUNTER — OFFICE VISIT (OUTPATIENT)
Dept: NEUROLOGY | Facility: CLINIC | Age: 24
End: 2024-01-16
Payer: COMMERCIAL

## 2024-01-16 VITALS
SYSTOLIC BLOOD PRESSURE: 116 MMHG | HEIGHT: 68 IN | HEART RATE: 80 BPM | WEIGHT: 194 LBS | BODY MASS INDEX: 29.4 KG/M2 | DIASTOLIC BLOOD PRESSURE: 78 MMHG

## 2024-01-16 DIAGNOSIS — G45.9 TRANSIENT CEREBRAL ISCHEMIA, UNSPECIFIED TYPE: Primary | ICD-10-CM

## 2024-01-16 DIAGNOSIS — Q27.30 AVM (ARTERIOVENOUS MALFORMATION): ICD-10-CM

## 2024-01-16 DIAGNOSIS — Q23.4 HLHS (HYPOPLASTIC LEFT HEART SYNDROME): ICD-10-CM

## 2024-01-16 DIAGNOSIS — G45.9 TIA (TRANSIENT ISCHEMIC ATTACK): ICD-10-CM

## 2024-01-16 PROCEDURE — 99999 PR PBB SHADOW E&M-EST. PATIENT-LVL III: CPT | Mod: PBBFAC,,, | Performed by: PSYCHIATRY & NEUROLOGY

## 2024-01-16 PROCEDURE — 99215 OFFICE O/P EST HI 40 MIN: CPT | Mod: S$GLB,,, | Performed by: PSYCHIATRY & NEUROLOGY

## 2024-01-16 NOTE — PROGRESS NOTES
Neurology Office Visit  Neurology  HPI:    Jil Lennon is a 23 y.o. female presents for a follow up from a recent hospitalization in October for transient left facial droop. She has a complex medical and cardiac history of hypoplastic left heart syndrome, coarctation of aorta and sees Dr Pollard (Pediatric cardiologist) in [place]. She had undergone extensive work up in October which had demonstrated concern for a right basal ganglia AVM, FLAIR changes in right basal ganglia which were not obvious on admission MRI, normal EEG, negative work up for DVT. She was then seen by Dr White (Dr Pollard's colleague) in October who had planned for holter monitor to rule out cardiac arrhythmia given her complex cardiac history. Her Holter was negative for A.Fib. She is planned to undergo cardiac cath in March.    Today, she states that she has no headaches anymore but reports dizziness and numbness in feet. She states that she has dizzy feeling any time of the day irrespective of the position. She describes it as a rocking boat sensation. She denies any recurrence of weakness, vision or speech changes, balance problems, facial droop, loss of consciousness. She is on aspirin.     ROS:  Review of Systems   HENT:  Negative for hearing loss and tinnitus.    Eyes:  Negative for blurred vision and double vision.   Cardiovascular:  Positive for orthopnea.   Gastrointestinal:  Negative for nausea and vomiting.   Neurological:  Positive for dizziness, sensory change and focal weakness. Negative for speech change and headaches.   Endo/Heme/Allergies:  Does not bruise/bleed easily.        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    Chylothorax     Congenital absence of pulmonary artery     to [place]    Dyspnea     Encounter for blood transfusion     Fracture of wrist     x4    General anesthetics causing adverse effect in therapeutic use     was mean  as a child when waking up after tonsillectomy    Hypoplastic left heart     Liver disease     enlarged liver    Obstructive sleep apnea     resolved by T&A    Obstructive sleep apnea (adult) (pediatric)     Post-Fontan protein-losing enteropathy     Stroke     mother states possibly had stroke at 3 y/o    TIA (transient ischemic attack) 10/03/2023    Tonsillar and adenoid hypertrophy      Past Surgical History:   Procedure Laterality Date    BIDIRECTIONAL JOSE W/ ATRIAL SEPTECTOMY      [facility]    CARDIAC SURGERY      CATHETER REFENESTRATION  1/11/2005     OF    COARCTATION STENT  3/9/11    COLONOSCOPY N/A 5/27/2021    Procedure: COLONOSCOPY;  Surgeon: Benigno Bowers MD;  Location: [facility] ENDO (2ND FLR);  Service: Endoscopy;  Laterality: N/A;    COMBINED RIGHT AND RETROGRADE LEFT HEART CATHETERIZATION FOR CONGENITAL HEART DEFECT N/A 3/7/2019    Procedure: CATHETERIZATION, HEART, COMBINED RIGHT AND RETROGRADE LEFT, FOR CONGENITAL HEART DEFECT;  Surgeon: Jimi Pollard Jr., MD;  Location: [facility] CATH LAB;  Service: Cardiology;  Laterality: N/A;  ped heart    ESOPHAGOGASTRODUODENOSCOPY N/A 5/27/2021    Procedure: EGD (ESOPHAGOGASTRODUODENOSCOPY);  Surgeon: Benigno Bowers MD;  Location: [facility] ENDO (2ND FLR);  Service: Endoscopy;  Laterality: N/A;  Patient will need pediatric heart anesthesiologist due to history of Fontan     mother states having formed stool         COVID test at [facility] on 5/24-GT    FONTAN PROCEDURE, EXTRACARDIAC  9/27/2004    [facility]    LPA     RE CONSTRUCTION      [facility]    LPA STENT  2/26/.2009    GLENISH    nati/ mitzi  age 3 days     done at [facility]    TONSILLECTOMY, ADENOIDECTOMY  11/2011    WISDOM TOOTH EXTRACTION       Family History   Problem Relation Age of Onset    No Known Problems Father     Urologic Abnormality Other     Thyroid disease Mother     No Known Problems Maternal Grandmother     Hypertension Maternal Grandfather     Skin cancer Maternal Grandfather      Hypertension Paternal Grandmother     Glaucoma Paternal Grandmother     No Known Problems Sister     No Known Problems Brother     No Known Problems Maternal Aunt     No Known Problems Maternal Uncle     No Known Problems Paternal Aunt     No Known Problems Paternal Uncle     No Known Problems Paternal Grandfather     Cancer Other 80        colon    Heart disease Neg Hx     Diabetes Neg Hx     Retinal detachment Neg Hx     Amblyopia Neg Hx     Blindness Neg Hx     Strabismus Neg Hx     Macular degeneration Neg Hx     Stroke Neg Hx     Breast cancer Neg Hx     Ovarian cancer Neg Hx     Esophageal cancer Neg Hx      Review of patient's allergies indicates:   Allergen Reactions    Adhesive Dermatitis       Current Outpatient Medications:     aspirin 81 MG Chew, Take 81 mg by mouth every evening., Disp: , Rfl:     digoxin (LANOXIN) 125 mcg tablet, Take 1 tablet (0.125 mg total) by mouth every evening., Disp: 90 tablet, Rfl: 10    enalapril (VASOTEC) 2.5 MG tablet, TAKE 1 TABLET BY MOUTH TWICE A DAY, Disp: 60 tablet, Rfl: 9    furosemide (LASIX) 20 MG tablet, Take 1 tablet (20 mg total) by mouth 2 (two) times daily., Disp: 60 tablet, Rfl: 10    sertraline (ZOLOFT) 50 MG tablet, Take 100 mg by mouth once daily., Disp: , Rfl:     sildenafil (REVATIO) 20 mg Tab, Take 1 tablet (20 mg total) by mouth 2 (two) times daily. (pt taking twice a day), Disp: 60 tablet, Rfl: 6    spironolactone (ALDACTONE) 25 MG tablet, Take 1 tablet (25 mg total) by mouth 2 (two) times daily., Disp: 60 tablet, Rfl: 9    tretinoin (RETIN-A) 0.025 % gel, Apply topically every evening., Disp: , Rfl:     VRAYLAR 1.5 mg Cap, Take 1 capsule by mouth once daily., Disp: , Rfl:   Outpatient Medications Marked as Taking for the 1/16/24 encounter (Office Visit) with lR Medrano MD   Medication Sig Dispense Refill    aspirin 81 MG Chew Take 81 mg by mouth every evening.      digoxin (LANOXIN) 125 mcg tablet Take 1 tablet (0.125 mg total) by mouth every  evening. 90 tablet 10    enalapril (VASOTEC) 2.5 MG tablet TAKE 1 TABLET BY MOUTH TWICE A DAY 60 tablet 9    furosemide (LASIX) 20 MG tablet Take 1 tablet (20 mg total) by mouth 2 (two) times daily. 60 tablet 10    sertraline (ZOLOFT) 50 MG tablet Take 100 mg by mouth once daily.      sildenafil (REVATIO) 20 mg Tab Take 1 tablet (20 mg total) by mouth 2 (two) times daily. (pt taking twice a day) 60 tablet 6    spironolactone (ALDACTONE) 25 MG tablet Take 1 tablet (25 mg total) by mouth 2 (two) times daily. 60 tablet 9    tretinoin (RETIN-A) 0.025 % gel Apply topically every evening.      VRAYLAR 1.5 mg Cap Take 1 capsule by mouth once daily.       Social History     Tobacco Use    Smoking status: Every Day     Types: Vaping with nicotine    Smokeless tobacco: Never    Tobacco comments:     No TAMMY   Substance Use Topics    Alcohol use: Yes     Comment: occassional    Drug use: No         Vitals:    01/16/24 1246   BP: 116/78   Pulse: 80       Physical Exam:  HEENT NC/AT  CV RRR, no tenderness  Resp clear without dyspnea  GI soft  Ext no edema    Neuro:  Alert & oriented x 3  EOMI, PERRLA, visual field intact in all 4 quadrants, no nystagmus or diplopia noted.   Face symmetric, speech clear and fluent, facial sensation equal bilaterally  Tongue midline  Strength 5/5 in bilateral upper and lower extremities   Sensation intact and equal bilaterally  Gait steady and balanced     Imaging:  Reviewed MRI, DSA    Assessment:   Jil Lennon is a 23 y.o. female presents for a follow up from a recent hospitalization in October for transient left facial droop. She has a complex medical and cardiac history of hypoplastic left heart syndrome, coarctation of aorta and sees Dr Pollard (Pediatric cardiologist) in [place]. She had undergone extensive work up in October which had demonstrated concern for a right basal ganglia AVM, FLAIR changes in right basal ganglia which were not obvious on admission MRI, normal EEG, negative  work up for DVT. Holter monitor neg for A.Fib.     I explained the possible etiology of the imaging findings seen on DSA and MRI which includes AVM, transient ischemia, focal seizures etc. At this time, the etiology of her transient left facial droop is unclear but includes TIA, AVM and focal seizures. She has a history of pulmonary micro-AVMs. I explained the risk of hemorrhage and AVM rupture which is minimal. Even if this is AVM, the treatment possibly includes radiation surgery given the deep location of the AVM. I explained the indication of AVM treatment and risks involved with it.  She is scheduled for a cardiac cath in March with Dr Pollard. She is cleared for anticoagulation to be used during the procedure. I explained a slightly higher risk of hemorrhage during the procedure if this is AVM but also noted that intra-procedural anticoagulation is frequently used without complications. I also explained the symptoms of TIA, intracranial hemorrhage and importance of seeking immediate care should she develop any neurological symptoms.     I will plan for repeating MRI brain and DSA before her cardiac cath which is scheduled in March.     Plan:   - MRI brain with and without contrast in February  - Diagnostic cerebral angiogram in February  - continue aspirin 81mg for stroke prevention.     Rl Medrano MD  Vascular and Interventional Neurology

## 2024-01-23 ENCOUNTER — TELEPHONE (OUTPATIENT)
Dept: NEUROLOGY | Facility: CLINIC | Age: 24
End: 2024-01-23
Payer: COMMERCIAL

## 2024-01-23 NOTE — TELEPHONE ENCOUNTER
Patient scheduled for Diagnostic Cerebral Angiogram on Feb 26th. Advised patient that a nurse from [facility] will call the Friday before with time and pre-op instructions. Patient verbalized understanding. MRI w/ and w/o contrast that is to be done prior to angiogram is scheduled on Feb 15th per [facility]. Patient currently doesn't have any medication to hold prior to procedure.

## 2024-01-30 NOTE — TELEPHONE ENCOUNTER
Patient r/s for Diagnostic Cerebral Angiogram 2/19/24. Advised patient that a nurse from [facility] will call the Friday before with time and pre-op instructions. Patient verbalized understanding. MRI w/ and w/o contrast that is to be done prior to angiogram is scheduled on Feb 15th per [facility]. Patient currently doesn't have any medication to hold prior to procedure.

## 2024-02-15 ENCOUNTER — HOSPITAL ENCOUNTER (OUTPATIENT)
Dept: RADIOLOGY | Facility: HOSPITAL | Age: 24
Discharge: HOME | End: 2024-02-15
Attending: PSYCHIATRY & NEUROLOGY
Payer: COMMERCIAL

## 2024-02-15 DIAGNOSIS — G45.9 TRANSIENT CEREBRAL ISCHEMIA, UNSPECIFIED TYPE: ICD-10-CM

## 2024-02-15 PROCEDURE — 25500020 PHARM REV CODE 255: Performed by: PSYCHIATRY & NEUROLOGY

## 2024-02-15 PROCEDURE — 70553 MRI BRAIN STEM W/O & W/DYE: CPT | Mod: TC

## 2024-02-15 PROCEDURE — A9577 INJ MULTIHANCE: HCPCS | Performed by: PSYCHIATRY & NEUROLOGY

## 2024-02-15 RX ADMIN — GADOBENATE DIMEGLUMINE 17 ML: 529 INJECTION, SOLUTION INTRAVENOUS at 10:02

## 2024-02-19 ENCOUNTER — HOSPITAL ENCOUNTER (OUTPATIENT)
Dept: INTERVENTIONAL RADIOLOGY/VASCULAR | Facility: HOSPITAL | Age: 24
Discharge: HOME | End: 2024-02-19
Attending: PSYCHIATRY & NEUROLOGY
Payer: COMMERCIAL

## 2024-02-19 VITALS
HEART RATE: 71 BPM | OXYGEN SATURATION: 89 % | BODY MASS INDEX: 31.63 KG/M2 | DIASTOLIC BLOOD PRESSURE: 75 MMHG | HEIGHT: 67 IN | SYSTOLIC BLOOD PRESSURE: 121 MMHG | WEIGHT: 201.5 LBS | TEMPERATURE: 98 F

## 2024-02-19 DIAGNOSIS — Q27.30 AVM (ARTERIOVENOUS MALFORMATION): ICD-10-CM

## 2024-02-19 DIAGNOSIS — G45.9 TIA (TRANSIENT ISCHEMIC ATTACK): ICD-10-CM

## 2024-02-19 DIAGNOSIS — G45.9 TRANSIENT CEREBRAL ISCHEMIA: ICD-10-CM

## 2024-02-19 DIAGNOSIS — G45.9 TRANSIENT CEREBRAL ISCHEMIA, UNSPECIFIED TYPE: ICD-10-CM

## 2024-02-19 LAB
ANION GAP SERPL CALC-SCNC: 9 MEQ/L
B-HCG UR QL: NEGATIVE
BASOPHILS # BLD AUTO: 0.05 X10(3)/MCL
BASOPHILS NFR BLD AUTO: 0.6 %
BUN SERPL-MCNC: 13.1 MG/DL (ref 7–18.7)
CALCIUM SERPL-MCNC: 9.7 MG/DL (ref 8.4–10.2)
CHLORIDE SERPL-SCNC: 106 MMOL/L (ref 98–107)
CO2 SERPL-SCNC: 24 MMOL/L (ref 22–29)
CREAT SERPL-MCNC: 0.74 MG/DL (ref 0.55–1.02)
CREAT/UREA NIT SERPL: 18
CTP QC/QA: YES
EOSINOPHIL # BLD AUTO: 0.24 X10(3)/MCL (ref 0–0.9)
EOSINOPHIL NFR BLD AUTO: 2.9 %
ERYTHROCYTE [DISTWIDTH] IN BLOOD BY AUTOMATED COUNT: 12.9 % (ref 11.5–17)
GFR SERPLBLD CREATININE-BSD FMLA CKD-EPI: >60 MLS/MIN/1.73/M2
GLUCOSE SERPL-MCNC: 87 MG/DL (ref 74–100)
HCT VFR BLD AUTO: 48 % (ref 37–47)
HGB BLD-MCNC: 17 G/DL (ref 12–16)
IMM GRANULOCYTES # BLD AUTO: 0.02 X10(3)/MCL (ref 0–0.04)
IMM GRANULOCYTES NFR BLD AUTO: 0.2 %
INR PPP: 1
LYMPHOCYTES # BLD AUTO: 1.07 X10(3)/MCL (ref 0.6–4.6)
LYMPHOCYTES NFR BLD AUTO: 12.9 %
MCH RBC QN AUTO: 32.3 PG (ref 27–31)
MCHC RBC AUTO-ENTMCNC: 35.4 G/DL (ref 33–36)
MCV RBC AUTO: 91.3 FL (ref 80–94)
MONOCYTES # BLD AUTO: 0.63 X10(3)/MCL (ref 0.1–1.3)
MONOCYTES NFR BLD AUTO: 7.6 %
NEUTROPHILS # BLD AUTO: 6.28 X10(3)/MCL (ref 2.1–9.2)
NEUTROPHILS NFR BLD AUTO: 75.8 %
NRBC BLD AUTO-RTO: 0 %
PLATELET # BLD AUTO: 203 X10(3)/MCL (ref 130–400)
PMV BLD AUTO: 11.2 FL (ref 7.4–10.4)
POTASSIUM SERPL-SCNC: 4 MMOL/L (ref 3.5–5.1)
PROTHROMBIN TIME: 13.3 SECONDS (ref 12.5–14.5)
RBC # BLD AUTO: 5.26 X10(6)/MCL (ref 4.2–5.4)
SODIUM SERPL-SCNC: 139 MMOL/L (ref 136–145)
WBC # SPEC AUTO: 8.29 X10(3)/MCL (ref 4.5–11.5)

## 2024-02-19 PROCEDURE — 63600175 PHARM REV CODE 636 W HCPCS: Performed by: PSYCHIATRY & NEUROLOGY

## 2024-02-19 PROCEDURE — 99153 MOD SED SAME PHYS/QHP EA: CPT | Performed by: PSYCHIATRY & NEUROLOGY

## 2024-02-19 PROCEDURE — 85610 PROTHROMBIN TIME: CPT | Performed by: PSYCHIATRY & NEUROLOGY

## 2024-02-19 PROCEDURE — 76937 US GUIDE VASCULAR ACCESS: CPT | Mod: 26,,, | Performed by: PSYCHIATRY & NEUROLOGY

## 2024-02-19 PROCEDURE — 36225 PLACE CATH SUBCLAVIAN ART: CPT | Mod: 59,LT | Performed by: PSYCHIATRY & NEUROLOGY

## 2024-02-19 PROCEDURE — 85025 COMPLETE CBC W/AUTO DIFF WBC: CPT | Performed by: PSYCHIATRY & NEUROLOGY

## 2024-02-19 PROCEDURE — 36226 PLACE CATH VERTEBRAL ART: CPT | Mod: RT | Performed by: PSYCHIATRY & NEUROLOGY

## 2024-02-19 PROCEDURE — 80048 BASIC METABOLIC PNL TOTAL CA: CPT | Performed by: PSYCHIATRY & NEUROLOGY

## 2024-02-19 PROCEDURE — C1894 INTRO/SHEATH, NON-LASER: HCPCS | Performed by: PSYCHIATRY & NEUROLOGY

## 2024-02-19 PROCEDURE — 36226 PLACE CATH VERTEBRAL ART: CPT | Mod: 51,RT,, | Performed by: PSYCHIATRY & NEUROLOGY

## 2024-02-19 PROCEDURE — C1769 GUIDE WIRE: HCPCS | Performed by: PSYCHIATRY & NEUROLOGY

## 2024-02-19 PROCEDURE — 99152 MOD SED SAME PHYS/QHP 5/>YRS: CPT | Mod: ,,, | Performed by: PSYCHIATRY & NEUROLOGY

## 2024-02-19 PROCEDURE — 36224 PLACE CATH CAROTD ART: CPT | Mod: 50

## 2024-02-19 PROCEDURE — 36225 PLACE CATH SUBCLAVIAN ART: CPT | Mod: 59,LT,, | Performed by: PSYCHIATRY & NEUROLOGY

## 2024-02-19 PROCEDURE — 76937 US GUIDE VASCULAR ACCESS: CPT | Mod: TC | Performed by: PSYCHIATRY & NEUROLOGY

## 2024-02-19 PROCEDURE — 99152 MOD SED SAME PHYS/QHP 5/>YRS: CPT | Performed by: PSYCHIATRY & NEUROLOGY

## 2024-02-19 PROCEDURE — 36224 PLACE CATH CAROTD ART: CPT | Mod: 50 | Performed by: PSYCHIATRY & NEUROLOGY

## 2024-02-19 PROCEDURE — 81025 URINE PREGNANCY TEST: CPT | Performed by: PSYCHIATRY & NEUROLOGY

## 2024-02-19 PROCEDURE — 25000003 PHARM REV CODE 250: Performed by: PSYCHIATRY & NEUROLOGY

## 2024-02-19 PROCEDURE — 27201423 OPTIME MED/SURG SUP & DEVICES STERILE SUPPLY: Performed by: PSYCHIATRY & NEUROLOGY

## 2024-02-19 PROCEDURE — 36224 PLACE CATH CAROTD ART: CPT | Mod: 50,,, | Performed by: PSYCHIATRY & NEUROLOGY

## 2024-02-19 PROCEDURE — C1887 CATHETER, GUIDING: HCPCS | Performed by: PSYCHIATRY & NEUROLOGY

## 2024-02-19 PROCEDURE — 25500020 PHARM REV CODE 255: Performed by: PSYCHIATRY & NEUROLOGY

## 2024-02-19 RX ORDER — ONDANSETRON HYDROCHLORIDE 2 MG/ML
4 INJECTION, SOLUTION INTRAVENOUS EVERY 8 HOURS PRN
Status: DISCONTINUED | OUTPATIENT
Start: 2024-02-19 | End: 2024-02-20 | Stop reason: HOSPADM

## 2024-02-19 RX ORDER — CARIPRAZINE 3 MG/1
3 CAPSULE, GELATIN COATED ORAL DAILY
COMMUNITY

## 2024-02-19 RX ORDER — LIDOCAINE HYDROCHLORIDE 20 MG/ML
INJECTION, SOLUTION INFILTRATION; PERINEURAL
Status: COMPLETED | OUTPATIENT
Start: 2024-02-19 | End: 2024-02-19

## 2024-02-19 RX ORDER — HEPARIN SOD,PORCINE/0.9 % NACL 1000/500ML
INTRAVENOUS SOLUTION INTRAVENOUS
Status: COMPLETED | OUTPATIENT
Start: 2024-02-19 | End: 2024-02-19

## 2024-02-19 RX ORDER — MIDAZOLAM HYDROCHLORIDE 1 MG/ML
INJECTION INTRAMUSCULAR; INTRAVENOUS
Status: COMPLETED | OUTPATIENT
Start: 2024-02-19 | End: 2024-02-19

## 2024-02-19 RX ORDER — VERAPAMIL HYDROCHLORIDE 2.5 MG/ML
INJECTION, SOLUTION INTRAVENOUS
Status: COMPLETED | OUTPATIENT
Start: 2024-02-19 | End: 2024-02-19

## 2024-02-19 RX ORDER — HEPARIN SODIUM 1000 [USP'U]/ML
INJECTION, SOLUTION INTRAVENOUS; SUBCUTANEOUS
Status: COMPLETED | OUTPATIENT
Start: 2024-02-19 | End: 2024-02-19

## 2024-02-19 RX ORDER — FENTANYL CITRATE 50 UG/ML
INJECTION, SOLUTION INTRAMUSCULAR; INTRAVENOUS
Status: COMPLETED | OUTPATIENT
Start: 2024-02-19 | End: 2024-02-19

## 2024-02-19 RX ORDER — ACETAMINOPHEN 325 MG/1
650 TABLET ORAL EVERY 4 HOURS PRN
Status: DISCONTINUED | OUTPATIENT
Start: 2024-02-19 | End: 2024-02-20 | Stop reason: HOSPADM

## 2024-02-19 RX ORDER — NITROGLYCERIN 5 MG/ML
INJECTION, SOLUTION INTRAVENOUS
Status: COMPLETED | OUTPATIENT
Start: 2024-02-19 | End: 2024-02-19

## 2024-02-19 RX ADMIN — NITROGLYCERIN 200 MCG: 5 INJECTION, SOLUTION INTRAVENOUS at 12:02

## 2024-02-19 RX ADMIN — LIDOCAINE HYDROCHLORIDE 5 ML: 20 INJECTION, SOLUTION INFILTRATION; PERINEURAL at 12:02

## 2024-02-19 RX ADMIN — FENTANYL CITRATE 25 MCG: 50 INJECTION INTRAMUSCULAR; INTRAVENOUS at 12:02

## 2024-02-19 RX ADMIN — VERAPAMIL HYDROCHLORIDE 2.5 MG: 2.5 INJECTION, SOLUTION INTRAVENOUS at 12:02

## 2024-02-19 RX ADMIN — MIDAZOLAM 1 MG: 1 INJECTION INTRAMUSCULAR; INTRAVENOUS at 12:02

## 2024-02-19 RX ADMIN — IOPAMIDOL 100 ML: 755 INJECTION, SOLUTION INTRAVENOUS at 01:02

## 2024-02-19 RX ADMIN — HEPARIN SODIUM 3500 UNITS: 1000 INJECTION, SOLUTION INTRAVENOUS; SUBCUTANEOUS at 12:02

## 2024-02-19 RX ADMIN — Medication 2000 ML: at 12:02

## 2024-02-19 NOTE — BRIEF OP NOTE
Name: Jil Lennon  MRN: [id number]  Age: 23 y.o.  Gender: female    Date of Procedure: 02/19/2024    Pre-operative Diagnosis: Concern for right basal ganglia AVM    Post-operative Diagnosis: no evidence of AVM    Procedure: Diagnostic cerebral angiogram    Access/Closure: Right radial artery     Surgeon: Rl Medrano MD    Anesthesia: LA and conscious sedation    EBL: min    Description of findings:  - no evidence of AVM    Patient condition:   stable    Implant/specimen(s):  none    Plan:  - No further endovascular intervention indicated.   - transient left facial droop likely due to TIA  - ok for anticoagulation if deemed necessary by her cardiologist.   - f/u as scheduled    Rl Medrano MD  Interventional Neurology

## 2024-02-19 NOTE — H&P
Pre-Operative History and Physical   Interventional Neurology    Jil Lennon is a 23 y.o. female with history of transient left facial droop presents for diagnostic cerebral angiogram due to concern for basal ganglia AVM. No new symptoms since the last clinic visit but reports her memory has been poor since the TIA in October, 2023. On asa currently. Planned for cardiac cath in March. Recent MRI showed resolution of basal ganglia edema but there is mild enhancement of the same region that were hyperintense on previous MRI.     ROS:  ROS as described in HPI    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    Chylothorax     Congenital absence of pulmonary artery     to [place]    Dyspnea     Encounter for blood transfusion     Fracture of wrist     x4    General anesthetics causing adverse effect in therapeutic use     was mean as a child when waking up after tonsillectomy    Hypoplastic left heart     Liver disease     enlarged liver    Obstructive sleep apnea     resolved by T&A    Obstructive sleep apnea (adult) (pediatric)     Post-Fontan protein-losing enteropathy     Stroke     mother states possibly had stroke at 3 y/o    TIA (transient ischemic attack) 10/03/2023    Tonsillar and adenoid hypertrophy      Past Surgical History:   Procedure Laterality Date    BIDIRECTIONAL JOSE W/ ATRIAL SEPTECTOMY      [place] [facility]    CARDIAC SURGERY      CATHETER REFENESTRATION  1/11/2005     [facility]    COARCTATION STENT  3/9/11    COLONOSCOPY N/A 5/27/2021    Procedure: COLONOSCOPY;  Surgeon: Benigno Bowers MD;  Location: [facility] ENDO ([address] [place] [address]);  Service: Endoscopy;  Laterality: N/A;    COMBINED RIGHT AND RETROGRADE LEFT HEART CATHETERIZATION FOR CONGENITAL HEART DEFECT N/A 3/7/2019    Procedure: CATHETERIZATION, HEART, COMBINED RIGHT AND RETROGRADE LEFT, FOR CONGENITAL HEART DEFECT;  Surgeon: Jimi Pollard Jr., MD;  Location: [facility] CATH LAB;   Service: Cardiology;  Laterality: N/A;  ped heart    ESOPHAGOGASTRODUODENOSCOPY N/A 5/27/2021    Procedure: EGD (ESOPHAGOGASTRODUODENOSCOPY);  Surgeon: Benigno Bowers MD;  Location: [facility] ([address] [place] [address]);  Service: Endoscopy;  Laterality: N/A;  Patient will need pediatric heart anesthesiologist due to history of Fontan     mother states having formed stool         COVID test at [facility] on 5/24-GT    FONTAN PROCEDURE, EXTRACARDIAC  9/27/2004    [facility]    LPA     RE CONSTRUCTION      [facility]    LPA STENT  2/26/.2009    [facility]    [place]/ mitzi  age 3 days     done at [facility]    TONSILLECTOMY, ADENOIDECTOMY  11/2011    WISDOM TOOTH EXTRACTION       Family History   Problem Relation Age of Onset    No Known Problems Father     Urologic Abnormality Other     Thyroid disease Mother     No Known Problems Maternal Grandmother     Hypertension Maternal Grandfather     Skin cancer Maternal Grandfather     Hypertension Paternal Grandmother     Glaucoma Paternal Grandmother     No Known Problems Sister     No Known Problems Brother     No Known Problems Maternal Aunt     No Known Problems Maternal Uncle     No Known Problems Paternal Aunt     No Known Problems Paternal Uncle     No Known Problems Paternal Grandfather     Cancer Other 80        colon    Heart disease Neg Hx     Diabetes Neg Hx     Retinal detachment Neg Hx     Amblyopia Neg Hx     Blindness Neg Hx     Strabismus Neg Hx     Macular degeneration Neg Hx     Stroke Neg Hx     Breast cancer Neg Hx     Ovarian cancer Neg Hx     Esophageal cancer Neg Hx      Review of patient's allergies indicates:   Allergen Reactions    Adhesive Dermatitis       Current Outpatient Medications:     aspirin 81 MG Chew, Take 81 mg by mouth every evening., Disp: , Rfl:     cariprazine (VRAYLAR) 3 mg Cap, Take 3 mg by mouth Daily., Disp: , Rfl:     digoxin (LANOXIN) 125 mcg tablet, Take 1 tablet (0.125 mg total) by mouth every evening., Disp: 90 tablet, Rfl: 10    furosemide (LASIX)  20 MG tablet, Take 1 tablet (20 mg total) by mouth 2 (two) times daily., Disp: 60 tablet, Rfl: 10    sertraline (ZOLOFT) 50 MG tablet, Take 100 mg by mouth once daily., Disp: , Rfl:     sildenafil (REVATIO) 20 mg Tab, Take 1 tablet (20 mg total) by mouth 2 (two) times daily. (pt taking twice a day), Disp: 60 tablet, Rfl: 6    spironolactone (ALDACTONE) 25 MG tablet, Take 1 tablet (25 mg total) by mouth 2 (two) times daily., Disp: 60 tablet, Rfl: 9    enalapril (VASOTEC) 2.5 MG tablet, TAKE 1 TABLET BY MOUTH TWICE A DAY, Disp: 60 tablet, Rfl: 9    tretinoin (RETIN-A) 0.025 % gel, Apply topically every evening., Disp: , Rfl:   Outpatient Medications Marked as Taking for the 2/19/24 encounter (Hospital Encounter) with [facility]   Medication Sig Dispense Refill    aspirin 81 MG Chew Take 81 mg by mouth every evening.      cariprazine (VRAYLAR) 3 mg Cap Take 3 mg by mouth Daily.      digoxin (LANOXIN) 125 mcg tablet Take 1 tablet (0.125 mg total) by mouth every evening. 90 tablet 10    furosemide (LASIX) 20 MG tablet Take 1 tablet (20 mg total) by mouth 2 (two) times daily. 60 tablet 10    sertraline (ZOLOFT) 50 MG tablet Take 100 mg by mouth once daily.      sildenafil (REVATIO) 20 mg Tab Take 1 tablet (20 mg total) by mouth 2 (two) times daily. (pt taking twice a day) 60 tablet 6    spironolactone (ALDACTONE) 25 MG tablet Take 1 tablet (25 mg total) by mouth 2 (two) times daily. 60 tablet 9     Social History     Tobacco Use    Smoking status: Every Day     Types: Vaping with nicotine    Smokeless tobacco: Never    Tobacco comments:     No TAMMY   Substance Use Topics    Alcohol use: Yes     Comment: occassional    Drug use: No         Vitals:    02/19/24 0912   BP: 100/65   Pulse: 74   Temp: 97.6 °F (36.4 °C)     Gen NAD  HEENT NC/AT  CV RRR,  Resp clear  GI soft  Ext no C/C/E    Neuro  AAOx4  Speech fluent, appropriate  EOMI, PERRLA, VFF  Normal facial strength, sensation  Tongue and palate midline  Motor 5/5  Sensation  intact  Coord intact      Assessment: 23 F with transient left facial droop presents for DSA due to concern for basal ganglia AVM.     Plan:   Diagnostic cerebral angiogram    I have discussed the risks, benefits, indications, and alternatives of the procedure in detail.  The patient verbalizes understanding.  All questions answered.  Consents signed.  The patient agrees to proceed to proceed as planned.

## 2024-02-19 NOTE — DISCHARGE INSTRUCTIONS
Remove dressing in 24hrs  -No driving for two Days  -Do not lift anything heavier than a gallon of milk for 5 days  -No tub bathing for 5 days (if you have a groin site).   -No lotions, powders, creams around site for 5 days  - Return to the nearest emergency room if you start running a fever; have any kind of discharge coming from the site, the site looks red or swollen.  - If site starts to bleed, lay flat on the ground, apply pressure to the site and call 911.

## 2024-02-19 NOTE — SEDATION DOCUMENTATION
Pt returned to bed. Right wrist access site free of bleeding or hematoma; TR band remains in place.

## 2024-03-14 ENCOUNTER — OFFICE VISIT (OUTPATIENT)
Dept: OPTOMETRY | Facility: CLINIC | Age: 24
End: 2024-03-14
Payer: COMMERCIAL

## 2024-03-14 ENCOUNTER — HOSPITAL ENCOUNTER (OUTPATIENT)
Dept: RADIOLOGY | Facility: HOSPITAL | Age: 24
Discharge: HOME | End: 2024-03-14
Attending: PEDIATRICS
Payer: COMMERCIAL

## 2024-03-14 ENCOUNTER — PATIENT MESSAGE (OUTPATIENT)
Dept: OPTOMETRY | Facility: CLINIC | Age: 24
End: 2024-03-14
Payer: COMMERCIAL

## 2024-03-14 ENCOUNTER — CLINICAL SUPPORT (OUTPATIENT)
Dept: OPHTHALMOLOGY | Facility: CLINIC | Age: 24
End: 2024-03-14
Payer: COMMERCIAL

## 2024-03-14 ENCOUNTER — PATIENT MESSAGE (OUTPATIENT)
Dept: PEDIATRIC CARDIOLOGY | Facility: CLINIC | Age: 24
End: 2024-03-14
Payer: COMMERCIAL

## 2024-03-14 DIAGNOSIS — G45.9 TIA (TRANSIENT ISCHEMIC ATTACK): ICD-10-CM

## 2024-03-14 DIAGNOSIS — H47.323 OPTIC DISC DRUSEN, BILATERAL: ICD-10-CM

## 2024-03-14 DIAGNOSIS — K76.1 CHRONIC PASSIVE CONGESTION OF LIVER: ICD-10-CM

## 2024-03-14 DIAGNOSIS — H47.323 OPTIC DISC DRUSEN, BILATERAL: Primary | ICD-10-CM

## 2024-03-14 PROCEDURE — 76705 ECHO EXAM OF ABDOMEN: CPT | Mod: TC

## 2024-03-14 PROCEDURE — 92250 FUNDUS PHOTOGRAPHY W/I&R: CPT | Mod: S$GLB,,, | Performed by: OPTOMETRIST

## 2024-03-14 PROCEDURE — 76981 USE PARENCHYMA: CPT | Mod: 59,TC

## 2024-03-14 PROCEDURE — 99999 PR PBB SHADOW E&M-EST. PATIENT-LVL III: CPT | Mod: PBBFAC,,, | Performed by: OPTOMETRIST

## 2024-03-14 PROCEDURE — 76705 ECHO EXAM OF ABDOMEN: CPT | Mod: 26,59,, | Performed by: RADIOLOGY

## 2024-03-14 PROCEDURE — 92015 DETERMINE REFRACTIVE STATE: CPT | Mod: S$GLB,,, | Performed by: OPTOMETRIST

## 2024-03-14 PROCEDURE — 76981 USE PARENCHYMA: CPT | Mod: 26,,, | Performed by: RADIOLOGY

## 2024-03-14 PROCEDURE — 92014 COMPRE OPH EXAM EST PT 1/>: CPT | Mod: S$GLB,,, | Performed by: OPTOMETRIST

## 2024-03-14 RX ORDER — ALPRAZOLAM 0.5 MG/1
0.5 TABLET ORAL NIGHTLY
COMMUNITY
Start: 2023-11-02

## 2024-03-14 RX ORDER — CICLOPIROX 10 MG/.96ML
SHAMPOO TOPICAL
COMMUNITY
Start: 2024-02-07

## 2024-03-14 NOTE — PROGRESS NOTES
VISUAL FIELD TEST 24-2 SS-OU-DONE/AD  OU-REL-FIX-COOP-GOOD/AD    PT HAS KNOWN ALLERGIES TO ADHESIVES. USED PIRATE PATCH./AD

## 2024-03-14 NOTE — PROGRESS NOTES
"HPI    Jil Lennon is a 23 y.o. female who returns for continued eye care.   Jil experienced a TIA in October.  She reports that she  had   heartburn and palpitations.  Then the left side of her face started to   droop and her thoughts became unclear ("brain fog"). She was taken to the   ED at [facility] where AVM was ruled out. She was examined   by Dr. Raj Davies on 10/2023. Optic nerve pathology (ION) was ruled   out.     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   Precision 1 daily disposable contact lenses are prescribed for visual   correction.  Today, she reports that comfort, handling and vision with the   contact lenses are good.  She expresses that it's a little harder to see   at night than it is during the day.     (--)blurred vision  (+)Headaches -    Onset: increased since TIA               Frequency: 2xweek   Duration: several hours    Location: starts frontally then radiates to temples   Pain quality/severity: 6/10; pressure   Associated factors: (--)nausea, (+)dizziness,       (+)photophobia, (--) phonophobia       (--)visual scotoma,      (--)blurred vision; Relief with Aleve        (--)diplopia  (--)flashes  (--)floaters  (--)pain  (--)Itching  (--)tearing  (--)burning  (--)Dryness  (--) OTC Drops  (--)Photophobia     Last edited by Petar Mcelroy, OD on 3/14/2024  9:37 AM.        For exam results, see encounter report    Assessment /Plan    1. Optic disc drusen, bilateral -->  - No papilledema  - Pupillary function intact  - No hyperemia  - No pallor  - No evidence of ION or other optic neuropathy   - BSCAN from 2016 confirms optic nerve drusen  - Will get fundus photos, OCT of ONH and RNFL and repeat HVF 24-2SS today  - Color Fundus Photography - OU - Both Eyes  - Posterior Segment OCT Optic " Nerve- Both eyes; Future  - Shepherd Visual Field - OU - Extended - Both Eyes; Standing    2. TIA (transient ischemic attack)  - Color Fundus Photography - OU - Both Eyes  - Posterior Segment OCT Optic Nerve- Both eyes; Future  - Shepherd Visual Field - OU - Extended - Both Eyes; Standing    3. High, Bilateral Myopia --> stable  - same specs ok  Glasses Prescription (3/14/2024)          Sphere Cylinder    Right -7.00 Sphere    Left -7.00 Sphere      Type: SVL    Expiration Date: 3/14/2025          - CLRx per below for daily disposal/replacement    -Advised against overnight wear, risks of overnight wear explained;     -Systane Balance, Soothe XP, Refresh Optive Advanced artificial tears for comfort prn;    -Optifree Puremoist, Biotrue , or Acuvue RevitaLens solutions recommended     Contact Lens Prescription (3/14/2024)          Brand Base Curve Diameter Sphere    Right Precision1 8.3 14.2 -7.00    Left Precision1 8.3 14.2 -7.00      Expiration Date: 3/14/2025    Replacement: Daily    Solutions: OptiFree PureMoist    Wearing Schedule: Daily Wear            Parent & Patient education; RTC pending testing results

## 2024-03-15 ENCOUNTER — ANESTHESIA (OUTPATIENT)
Dept: MEDSURG UNIT | Facility: HOSPITAL | Age: 24
End: 2024-03-15
Payer: COMMERCIAL

## 2024-03-15 ENCOUNTER — HOSPITAL ENCOUNTER (OUTPATIENT)
Facility: HOSPITAL | Age: 24
Discharge: HOME | End: 2024-03-15
Attending: PEDIATRICS | Admitting: PEDIATRICS
Payer: COMMERCIAL

## 2024-03-15 ENCOUNTER — ANESTHESIA EVENT (OUTPATIENT)
Dept: MEDSURG UNIT | Facility: HOSPITAL | Age: 24
End: 2024-03-15
Payer: COMMERCIAL

## 2024-03-15 VITALS
WEIGHT: 201.5 LBS | HEART RATE: 72 BPM | TEMPERATURE: 98 F | OXYGEN SATURATION: 89 % | SYSTOLIC BLOOD PRESSURE: 131 MMHG | BODY MASS INDEX: 31.63 KG/M2 | DIASTOLIC BLOOD PRESSURE: 56 MMHG | RESPIRATION RATE: 23 BRPM | HEIGHT: 67 IN

## 2024-03-15 DIAGNOSIS — Z98.890 S/P FONTAN PROCEDURE: ICD-10-CM

## 2024-03-15 DIAGNOSIS — Z98.890 PERSONAL HISTORY OF SURGERY TO HEART AND GREAT VESSELS, PRESENTING HAZARDS TO HEALTH: ICD-10-CM

## 2024-03-15 DIAGNOSIS — R68.89 EXERCISE INTOLERANCE: ICD-10-CM

## 2024-03-15 DIAGNOSIS — Q23.4 HLHS (HYPOPLASTIC LEFT HEART SYNDROME): Primary | ICD-10-CM

## 2024-03-15 DIAGNOSIS — Q27.30 AVM (ARTERIOVENOUS MALFORMATION): ICD-10-CM

## 2024-03-15 DIAGNOSIS — R23.0 CYANOSIS: ICD-10-CM

## 2024-03-15 DIAGNOSIS — Z98.890 POST-FONTAN PROTEIN-LOSING ENTEROPATHY: ICD-10-CM

## 2024-03-15 DIAGNOSIS — K90.49 POST-FONTAN PROTEIN-LOSING ENTEROPATHY: ICD-10-CM

## 2024-03-15 LAB
ABO + RH BLD: NORMAL
ANION GAP SERPL CALC-SCNC: 6 MMOL/L (ref 8–16)
BASOPHILS # BLD AUTO: 0.03 K/UL (ref 0–0.2)
BASOPHILS NFR BLD: 0.6 % (ref 0–1.9)
BLD GP AB SCN CELLS X3 SERPL QL: NORMAL
BUN SERPL-MCNC: 10 MG/DL (ref 6–20)
CALCIUM SERPL-MCNC: 9 MG/DL (ref 8.7–10.5)
CHLORIDE SERPL-SCNC: 112 MMOL/L (ref 95–110)
CO2 SERPL-SCNC: 21 MMOL/L (ref 23–29)
CREAT SERPL-MCNC: 0.7 MG/DL (ref 0.5–1.4)
DIFFERENTIAL METHOD BLD: ABNORMAL
EOSINOPHIL # BLD AUTO: 0.2 K/UL (ref 0–0.5)
EOSINOPHIL NFR BLD: 3.1 % (ref 0–8)
ERYTHROCYTE [DISTWIDTH] IN BLOOD BY AUTOMATED COUNT: 12.3 % (ref 11.5–14.5)
EST. GFR  (NO RACE VARIABLE): >60 ML/MIN/1.73 M^2
GLUCOSE SERPL-MCNC: 109 MG/DL (ref 70–110)
HCT VFR BLD AUTO: 41.1 % (ref 37–48.5)
HGB BLD-MCNC: 14.2 G/DL (ref 12–16)
IMM GRANULOCYTES # BLD AUTO: 0.01 K/UL (ref 0–0.04)
IMM GRANULOCYTES NFR BLD AUTO: 0.2 % (ref 0–0.5)
LYMPHOCYTES # BLD AUTO: 0.7 K/UL (ref 1–4.8)
LYMPHOCYTES NFR BLD: 13.3 % (ref 18–48)
MCH RBC QN AUTO: 32.1 PG (ref 27–31)
MCHC RBC AUTO-ENTMCNC: 34.5 G/DL (ref 32–36)
MCV RBC AUTO: 93 FL (ref 82–98)
MONOCYTES # BLD AUTO: 0.4 K/UL (ref 0.3–1)
MONOCYTES NFR BLD: 8 % (ref 4–15)
NEUTROPHILS # BLD AUTO: 3.7 K/UL (ref 1.8–7.7)
NEUTROPHILS NFR BLD: 74.8 % (ref 38–73)
NRBC BLD-RTO: 0 /100 WBC
PLATELET # BLD AUTO: 149 K/UL (ref 150–450)
PMV BLD AUTO: 11.3 FL (ref 9.2–12.9)
POTASSIUM SERPL-SCNC: 3.8 MMOL/L (ref 3.5–5.1)
RBC # BLD AUTO: 4.43 M/UL (ref 4–5.4)
SODIUM SERPL-SCNC: 139 MMOL/L (ref 136–145)
SPECIMEN OUTDATE: NORMAL
WBC # BLD AUTO: 4.88 K/UL (ref 3.9–12.7)

## 2024-03-15 PROCEDURE — 85347 COAGULATION TIME ACTIVATED: CPT | Performed by: PEDIATRICS

## 2024-03-15 PROCEDURE — 84132 ASSAY OF SERUM POTASSIUM: CPT | Performed by: PEDIATRICS

## 2024-03-15 PROCEDURE — 82947 ASSAY GLUCOSE BLOOD QUANT: CPT | Mod: 91 | Performed by: PEDIATRICS

## 2024-03-15 PROCEDURE — D9220A PRA ANESTHESIA: Mod: ANES,,, | Performed by: STUDENT IN AN ORGANIZED HEALTH CARE EDUCATION/TRAINING PROGRAM

## 2024-03-15 PROCEDURE — D9220A PRA ANESTHESIA: Mod: CRNA,,, | Performed by: NURSE ANESTHETIST, CERTIFIED REGISTERED

## 2024-03-15 PROCEDURE — 83605 ASSAY OF LACTIC ACID: CPT | Performed by: PEDIATRICS

## 2024-03-15 PROCEDURE — 37000008 HC ANESTHESIA 1ST 15 MINUTES: Performed by: PEDIATRICS

## 2024-03-15 PROCEDURE — 25500020 PHARM REV CODE 255: Performed by: PEDIATRICS

## 2024-03-15 PROCEDURE — 93567 NJX CAR CTH SPRVLV AORTGRPHY: CPT | Performed by: PEDIATRICS

## 2024-03-15 PROCEDURE — 75825 VEIN X-RAY TRUNK: CPT | Mod: 26,,, | Performed by: PEDIATRICS

## 2024-03-15 PROCEDURE — 86901 BLOOD TYPING SEROLOGIC RH(D): CPT | Performed by: PEDIATRICS

## 2024-03-15 PROCEDURE — 36010 PLACE CATHETER IN VEIN: CPT | Mod: 59,,, | Performed by: PEDIATRICS

## 2024-03-15 PROCEDURE — 63600175 PHARM REV CODE 636 W HCPCS: Performed by: STUDENT IN AN ORGANIZED HEALTH CARE EDUCATION/TRAINING PROGRAM

## 2024-03-15 PROCEDURE — 25000003 PHARM REV CODE 250: Performed by: PEDIATRICS

## 2024-03-15 PROCEDURE — 37000009 HC ANESTHESIA EA ADD 15 MINS: Performed by: PEDIATRICS

## 2024-03-15 PROCEDURE — C1751 CATH, INF, PER/CENT/MIDLINE: HCPCS | Performed by: PEDIATRICS

## 2024-03-15 PROCEDURE — 25000003 PHARM REV CODE 250: Performed by: STUDENT IN AN ORGANIZED HEALTH CARE EDUCATION/TRAINING PROGRAM

## 2024-03-15 PROCEDURE — 93597 R&L HRT CATH CHD ABNL NT CNJ: CPT | Performed by: PEDIATRICS

## 2024-03-15 PROCEDURE — 82330 ASSAY OF CALCIUM: CPT | Performed by: PEDIATRICS

## 2024-03-15 PROCEDURE — C1769 GUIDE WIRE: HCPCS | Performed by: PEDIATRICS

## 2024-03-15 PROCEDURE — 75827 VEIN X-RAY CHEST: CPT | Performed by: PEDIATRICS

## 2024-03-15 PROCEDURE — 80048 BASIC METABOLIC PNL TOTAL CA: CPT | Performed by: PEDIATRICS

## 2024-03-15 PROCEDURE — C1894 INTRO/SHEATH, NON-LASER: HCPCS | Performed by: PEDIATRICS

## 2024-03-15 PROCEDURE — 82805 BLOOD GASES W/O2 SATURATION: CPT | Performed by: PEDIATRICS

## 2024-03-15 PROCEDURE — 75827 VEIN X-RAY CHEST: CPT | Mod: 26,,, | Performed by: PEDIATRICS

## 2024-03-15 PROCEDURE — 93597 R&L HRT CATH CHD ABNL NT CNJ: CPT | Mod: 26,,, | Performed by: PEDIATRICS

## 2024-03-15 PROCEDURE — 75825 VEIN X-RAY TRUNK: CPT | Performed by: PEDIATRICS

## 2024-03-15 PROCEDURE — 93597 R&L HRT CATH CHD ABNL NT CNJ: CPT | Mod: 26,82,, | Performed by: PEDIATRICS

## 2024-03-15 PROCEDURE — 85025 COMPLETE CBC W/AUTO DIFF WBC: CPT | Performed by: PEDIATRICS

## 2024-03-15 PROCEDURE — 36010 PLACE CATHETER IN VEIN: CPT | Mod: 59 | Performed by: PEDIATRICS

## 2024-03-15 PROCEDURE — 93567 NJX CAR CTH SPRVLV AORTGRPHY: CPT | Mod: ,,, | Performed by: PEDIATRICS

## 2024-03-15 PROCEDURE — 93567 NJX CAR CTH SPRVLV AORTGRPHY: CPT | Mod: 82,,, | Performed by: PEDIATRICS

## 2024-03-15 PROCEDURE — 75825 VEIN X-RAY TRUNK: CPT | Mod: 26,82,, | Performed by: PEDIATRICS

## 2024-03-15 RX ORDER — HEPARIN SODIUM 1000 [USP'U]/ML
INJECTION, SOLUTION INTRAVENOUS; SUBCUTANEOUS
Status: DISCONTINUED | OUTPATIENT
Start: 2024-03-15 | End: 2024-03-15

## 2024-03-15 RX ORDER — HALOPERIDOL 5 MG/ML
0.5 INJECTION INTRAMUSCULAR EVERY 10 MIN PRN
Status: DISCONTINUED | OUTPATIENT
Start: 2024-03-15 | End: 2024-03-15 | Stop reason: HOSPADM

## 2024-03-15 RX ORDER — ONDANSETRON HYDROCHLORIDE 2 MG/ML
4 INJECTION, SOLUTION INTRAVENOUS EVERY 12 HOURS PRN
Status: DISCONTINUED | OUTPATIENT
Start: 2024-03-15 | End: 2024-03-15 | Stop reason: HOSPADM

## 2024-03-15 RX ORDER — SODIUM CHLORIDE 0.9 % (FLUSH) 0.9 %
3 SYRINGE (ML) INJECTION EVERY 4 HOURS PRN
Status: DISCONTINUED | OUTPATIENT
Start: 2024-03-15 | End: 2024-03-15 | Stop reason: HOSPADM

## 2024-03-15 RX ORDER — FENTANYL CITRATE 50 UG/ML
INJECTION, SOLUTION INTRAMUSCULAR; INTRAVENOUS
Status: DISCONTINUED | OUTPATIENT
Start: 2024-03-15 | End: 2024-03-15

## 2024-03-15 RX ORDER — FENTANYL CITRATE 50 UG/ML
25 INJECTION, SOLUTION INTRAMUSCULAR; INTRAVENOUS EVERY 5 MIN PRN
Status: DISCONTINUED | OUTPATIENT
Start: 2024-03-15 | End: 2024-03-15 | Stop reason: HOSPADM

## 2024-03-15 RX ORDER — PROTAMINE SULFATE 10 MG/ML
INJECTION, SOLUTION INTRAVENOUS
Status: DISCONTINUED | OUTPATIENT
Start: 2024-03-15 | End: 2024-03-15

## 2024-03-15 RX ORDER — DEXMEDETOMIDINE HYDROCHLORIDE 100 UG/ML
INJECTION, SOLUTION INTRAVENOUS
Status: DISCONTINUED | OUTPATIENT
Start: 2024-03-15 | End: 2024-03-15

## 2024-03-15 RX ORDER — ACETAMINOPHEN 325 MG/1
650 TABLET ORAL EVERY 4 HOURS PRN
Status: DISCONTINUED | OUTPATIENT
Start: 2024-03-15 | End: 2024-03-15 | Stop reason: HOSPADM

## 2024-03-15 RX ORDER — IODIXANOL 320 MG/ML
INJECTION, SOLUTION INTRAVASCULAR
Status: DISCONTINUED | OUTPATIENT
Start: 2024-03-15 | End: 2024-03-15 | Stop reason: HOSPADM

## 2024-03-15 RX ORDER — MIDAZOLAM HYDROCHLORIDE 1 MG/ML
1 INJECTION INTRAMUSCULAR; INTRAVENOUS EVERY 10 MIN PRN
Status: DISCONTINUED | OUTPATIENT
Start: 2024-03-15 | End: 2024-03-15 | Stop reason: HOSPADM

## 2024-03-15 RX ORDER — MIDAZOLAM HYDROCHLORIDE 1 MG/ML
INJECTION, SOLUTION INTRAMUSCULAR; INTRAVENOUS
Status: DISCONTINUED | OUTPATIENT
Start: 2024-03-15 | End: 2024-03-15

## 2024-03-15 RX ORDER — MORPHINE SULFATE 2 MG/ML
2 INJECTION, SOLUTION INTRAMUSCULAR; INTRAVENOUS
Status: DISCONTINUED | OUTPATIENT
Start: 2024-03-15 | End: 2024-03-15 | Stop reason: HOSPADM

## 2024-03-15 RX ADMIN — MIDAZOLAM HYDROCHLORIDE 2 MG: 1 INJECTION, SOLUTION INTRAMUSCULAR; INTRAVENOUS at 10:03

## 2024-03-15 RX ADMIN — FENTANYL CITRATE 25 MCG: 50 INJECTION, SOLUTION INTRAMUSCULAR; INTRAVENOUS at 10:03

## 2024-03-15 RX ADMIN — DEXMEDETOMIDINE 8 MCG: 100 INJECTION, SOLUTION, CONCENTRATE INTRAVENOUS at 10:03

## 2024-03-15 RX ADMIN — SODIUM CHLORIDE: 0.9 INJECTION, SOLUTION INTRAVENOUS at 10:03

## 2024-03-15 RX ADMIN — HEPARIN SODIUM 5000 UNITS: 1000 INJECTION, SOLUTION INTRAVENOUS; SUBCUTANEOUS at 10:03

## 2024-03-15 RX ADMIN — FENTANYL CITRATE 25 MCG: 50 INJECTION INTRAMUSCULAR; INTRAVENOUS at 12:03

## 2024-03-15 RX ADMIN — FENTANYL CITRATE 25 MCG: 50 INJECTION, SOLUTION INTRAMUSCULAR; INTRAVENOUS at 11:03

## 2024-03-15 RX ADMIN — MIDAZOLAM HYDROCHLORIDE 1 MG: 1 INJECTION, SOLUTION INTRAMUSCULAR; INTRAVENOUS at 10:03

## 2024-03-15 RX ADMIN — ACETAMINOPHEN 650 MG: 325 TABLET ORAL at 12:03

## 2024-03-15 RX ADMIN — PROTAMINE SULFATE 40 MG: 10 INJECTION, SOLUTION INTRAVENOUS at 11:03

## 2024-03-15 RX ADMIN — DEXMEDETOMIDINE 4 MCG: 100 INJECTION, SOLUTION, CONCENTRATE INTRAVENOUS at 10:03

## 2024-03-15 NOTE — ANESTHESIA RELEASE NOTE
"Anesthesia Release from PACU Note    Patient: Jil Lennon    Procedure(s) Performed: Procedure(s) (LRB):  Catheterization, Heart, Combined Right and Retrograde Left, for Congenital Heart Defect (N/A)  Aortogram, Pediatric  VENOGRAM, ANOMALOUS OR PERSISTENT VENA CAVA  VENOGRAM, VENOVENOUS COLLATERALS ORIGINATING BELOW THE HEART    Anesthesia type: general    Post pain: Adequate analgesia    Post assessment: no apparent anesthetic complications    Last Vitals: Visit Vitals  BP (!) 103/56   Pulse 61   Temp 36.8 °C (98.2 °F)   Resp 17   Ht 5' 6.93" (1.7 m)   Wt 91.4 kg (201 lb 8 oz)   SpO2 (!) 91%   Breastfeeding No   BMI 31.63 kg/m²       Post vital signs: stable    Level of consciousness: awake    Nausea/Vomiting: no nausea/no vomiting    Complications: none    Airway Patency: patent    Respiratory: unassisted, spontaneous ventilation    Cardiovascular: stable    Hydration: euvolemic  "

## 2024-03-15 NOTE — Clinical Note
The catheter was repositioned into the left pulmonary artery. Hemodynamics were performed.  The patient's O2 saturation measured 69%.

## 2024-03-15 NOTE — Clinical Note
An angiography was performed of the aorta. The angiography was performed via power injection. The injected amount was 40 mL contrast at 20 mL/s. The PSI from the power injection was 900.

## 2024-03-15 NOTE — SUBJECTIVE & OBJECTIVE
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    Chylothorax     Congenital absence of pulmonary artery     to [place]    Dyspnea     Encounter for blood transfusion     Fracture of wrist     x4    General anesthetics causing adverse effect in therapeutic use     was mean as a child when waking up after tonsillectomy    Hypoplastic left heart     Liver disease     enlarged liver    Obstructive sleep apnea     resolved by T&A    Obstructive sleep apnea (adult) (pediatric)     Post-Fontan protein-losing enteropathy     Stroke     mother states possibly had stroke at 4 y/o    TIA (transient ischemic attack) 10/03/2023    Tonsillar and adenoid hypertrophy        Past Surgical History:   Procedure Laterality Date    BIDIRECTIONAL JOSE W/ ATRIAL SEPTECTOMY      [facility]    CARDIAC SURGERY      CATHETER REFENESTRATION  1/11/2005     [facility]    COARCTATION STENT  3/9/11    COLONOSCOPY N/A 5/27/2021    Procedure: COLONOSCOPY;  Surgeon: Benigno Bowers MD;  Location: [facility]O [facility]  Service: Endoscopy;  Laterality: N/A;    COMBINED RIGHT AND RETROGRADE LEFT HEART CATHETERIZATION FOR CONGENITAL HEART DEFECT N/A 3/7/2019    Procedure: CATHETERIZATION, HEART, COMBINED RIGHT AND RETROGRADE LEFT, FOR CONGENITAL HEART DEFECT;  Surgeon: Jimi Pollard Jr., MD;  Location: [facility] CATH LAB;  Service: Cardiology;  Laterality: N/A;  ped heart    ESOPHAGOGASTRODUODENOSCOPY N/A 5/27/2021    Procedure: EGD (ESOPHAGOGASTRODUODENOSCOPY);  Surgeon: Benigno Bowers MD;  Location: [facility]O [facility]);  Service: Endoscopy;  Laterality: N/A;  Patient will need pediatric heart anesthesiologist due to history of Fontan     mother states having formed stool         COVID test at [facility] on 5/24-GT    FONTAN PROCEDURE, EXTRACARDIAC  9/27/2004    [facility]    LPA     RE CONSTRUCTION      [facility]    LPA STENT  2/26/.2009    OF    narwood/ mitzi  age 3 days     done at  [facility]    TONSILLECTOMY, ADENOIDECTOMY  11/2011    WISDOM TOOTH EXTRACTION         Review of patient's allergies indicates:   Allergen Reactions    Adhesive Dermatitis       No current facility-administered medications on file prior to encounter.     Current Outpatient Medications on File Prior to Encounter   Medication Sig    aspirin 81 MG Chew Take 81 mg by mouth every evening.    digoxin (LANOXIN) 125 mcg tablet Take 1 tablet (0.125 mg total) by mouth every evening.    enalapril (VASOTEC) 2.5 MG tablet TAKE 1 TABLET BY MOUTH TWICE A DAY    furosemide (LASIX) 20 MG tablet Take 1 tablet (20 mg total) by mouth 2 (two) times daily.    sertraline (ZOLOFT) 50 MG tablet Take 100 mg by mouth once daily.    sildenafil (REVATIO) 20 mg Tab Take 1 tablet (20 mg total) by mouth 2 (two) times daily. (pt taking twice a day)    spironolactone (ALDACTONE) 25 MG tablet Take 1 tablet (25 mg total) by mouth 2 (two) times daily.    tretinoin (RETIN-A) 0.025 % gel Apply topically every evening.     Family History       Problem Relation (Age of Onset)    Cancer Other (80)    Glaucoma Paternal Grandmother    Hypertension Maternal Grandfather, Paternal Grandmother    No Known Problems Father, Maternal Grandmother, Sister, Brother, Maternal Aunt, Maternal Uncle, Paternal Aunt, Paternal Uncle, Paternal Grandfather    Skin cancer Maternal Grandfather    Thyroid disease Mother    Urologic Abnormality Other          Social History     Social History Narrative    Parents . Lives with mother and step father, 2 cats, 2 sugar gliders,    Attending EMT training     Review of Systems   All other systems reviewed and are negative.    Objective:     Vital Signs (Most Recent):  Temp: 98.1 °F (36.7 °C) (03/15/24 0934)  Pulse: 70 (03/15/24 0934)  Resp: 20 (03/15/24 0934)  BP: 117/64 (03/15/24 0934)  SpO2: (!) 89 % (baseline sats high 80's) (03/15/24 0934) Vital Signs (24h Range):  Temp:  [98.1 °F (36.7 °C)] 98.1 °F (36.7 °C)  Pulse:  [70]  70  Resp:  [20] 20  SpO2:  [89 %] 89 %  BP: (117)/(64) 117/64     Weight: 91.4 kg (201 lb 8 oz)  Body mass index is 31.63 kg/m².    SpO2: (!) 89 % (baseline sats high 80's)       No intake or output data in the 24 hours ending 03/15/24 1026    Lines/Drains/Airways       Peripheral Intravenous Line  Duration                  Peripheral IV - Single Lumen 03/15/24 1017 20 G Right Forearm <1 day                       Physical Exam  Constitutional:       Appearance: Normal appearance.   HENT:      Head: Normocephalic and atraumatic.      Nose: Nose normal.      Mouth/Throat:      Mouth: Mucous membranes are moist.   Cardiovascular:      Rate and Rhythm: Normal rate.   Pulmonary:      Effort: Pulmonary effort is normal.      Breath sounds: Normal breath sounds.   Musculoskeletal:         General: Normal range of motion.      Cervical back: Normal range of motion and neck supple.   Skin:     General: Skin is warm.      Capillary Refill: Capillary refill takes less than 2 seconds.   Neurological:      General: No focal deficit present.      Mental Status: She is alert.            Significant Labs: BMP:   Glucose (mg/dL)   Date/Time Value Status   10/09/2023 12:32 PM 82 Final     Sodium (mmol/L)   Date/Time Value Status   10/09/2023 12:32  Final     Sodium Level (mmol/L)   Date/Time Value Status   02/19/2024 09:37  Final     Potassium (mmol/L)   Date/Time Value Status   10/09/2023 12:32 PM 4.6 Final     Potassium Level (mmol/L)   Date/Time Value Status   02/19/2024 09:37 AM 4.0 Final     Chloride (mmol/L)   Date/Time Value Status   10/09/2023 12:32  Final     CO2 (mmol/L)   Date/Time Value Status   10/09/2023 12:32 PM 22 (L) Final     Carbon Dioxide (mmol/L)   Date/Time Value Status   02/19/2024 09:37 AM 24 Final     BUN (mg/dL)   Date/Time Value Status   10/09/2023 12:32 PM 17 Final     Blood Urea Nitrogen (mg/dL)   Date/Time Value Status   02/19/2024 09:37 AM 13.1 Final     Creatinine (mg/dL)   Date/Time  Value Status   02/19/2024 09:37 AM 0.74 Final   10/09/2023 12:32 PM 0.7 Final     Calcium (mg/dL)   Date/Time Value Status   10/09/2023 12:32 PM 10.1 Final     Calcium Level Total (mg/dL)   Date/Time Value Status   02/19/2024 09:37 AM 9.7 Final     Magnesium (mg/dL)   Date/Time Value Status   08/20/2021 10:39 AM 1.8 Final     Magnesium Level (mg/dL)   Date/Time Value Status   10/04/2023 01:14 PM 1.90 Final    and CBC   WBC   Date/Time Value Status   02/19/2024 09:37 AM 8.29 x10(3)/mcL Final   10/09/2023 12:32 PM 10.62 K/uL Final     Hgb (g/dL)   Date/Time Value Status   02/19/2024 09:37 AM 17.0 (H) Final     Hemoglobin (g/dL)   Date/Time Value Status   10/09/2023 12:32 PM 18.5 (H) Final     POC Hematocrit (%PCV)   Date/Time Value Status   10/03/2023 04:09 PM 52 Final     Hematocrit (%)   Date/Time Value Status   10/09/2023 12:32 PM 52.2 (H) Final     Hct (%)   Date/Time Value Status   02/19/2024 09:37 AM 48.0 (H) Final     MCV (fL)   Date/Time Value Status   02/19/2024 09:37 AM 91.3 Final   10/09/2023 12:32 PM 91 Final     Platelets (K/uL)   Date/Time Value Status   10/09/2023 12:32  Final     Platelet (x10(3)/mcL)   Date/Time Value Status   02/19/2024 09:37  Final       Significant Imaging:  None

## 2024-03-15 NOTE — Clinical Note
The catheter was inserted into the inferior vena cava. Hemodynamics were performed.  The patient's O2 saturation measured 66%.

## 2024-03-15 NOTE — DISCHARGE INSTRUCTIONS
AFTER THE PROCEDURE:  You may remove the bandage in 24 hours and wash with soap and water.  You may shower, but do not soak in a tub for three days.     PRECAUTIONS FOR THE NEXT 24 HOURS:  If you need to cough, sneeze, have a bowel movement, or bear down, hold pressure over your bandage.  Do not  anything heavier than a gallon of milk(about 5 pounds)  Avoid excessive bending over.    SYMPTOMS TO WATCH FOR AND REPORT TO YOUR DOCTOR:  BLEEDING: hold pressure over the site until bleeding stops. Proceed to Emergency Room by ambulance (do not drive yourself) if unable to stop bleeding. Notify your doctor.  HEMATOMA (hard bruise under the skin): [place] around the bruise if one develops. Call your doctor if it increases in size or if you have difficulty talking, swallowing, breathing or anything unusual.  SIGNS OF INFECTION: Fever (temperature over 100.5 F), pus or redness  RASH  CHEST PAIN OR SHORTNESS OF BREATH    You may call the Pediatric Cardiology Service doctor on call at [phone].

## 2024-03-15 NOTE — DISCHARGE SUMMARY
[facility] - Cath Lab  Discharge Note  Short Stay    Procedure(s) (LRB):  Catheterization, Heart, Combined Right and Retrograde Left, for Congenital Heart Defect (N/A)  Aortogram, Pediatric  VENOGRAM, ANOMALOUS OR PERSISTENT VENA CAVA  VENOGRAM, VENOVENOUS COLLATERALS ORIGINATING BELOW THE HEART      OUTCOME: Patient tolerated treatment/procedure well without complication and is now ready for discharge.    DISPOSITION: Home or Self Care    FINAL DIAGNOSIS:  HLHS    FOLLOWUP: In clinic    DISCHARGE INSTRUCTIONS:    Discharge Procedure Orders   Diet Adult Regular     Notify your health care provider if you experience any of the following:  difficulty breathing or increased cough     Notify your health care provider if you experience any of the following:  redness, tenderness, or signs of infection (pain, swelling, redness, odor or green/yellow discharge around incision site)     Notify your health care provider if you experience any of the following:  severe uncontrolled pain     Notify your health care provider if you experience any of the following:  persistent nausea and vomiting or diarrhea     Notify your health care provider if you experience any of the following:  temperature >100.4     Activity as tolerated        TIME SPENT ON DISCHARGE: 30 minutes

## 2024-03-15 NOTE — TRANSFER OF CARE
"Anesthesia Transfer of Care Note    Patient: Jil Lennon    Procedure(s) Performed: Procedure(s) (LRB):  Catheterization, Heart, Combined Right and Retrograde Left, for Congenital Heart Defect (N/A)  Aortogram, Pediatric  VENOGRAM, ANOMALOUS OR PERSISTENT VENA CAVA  VENOGRAM, VENOVENOUS COLLATERALS ORIGINATING BELOW THE HEART    Patient location: PACU    Anesthesia Type: MAC    Transport from OR: Transported from OR on 2-3 L/min O2 by NC with adequate spontaneous ventilation    Post pain: adequate analgesia    Post assessment: no apparent anesthetic complications and tolerated procedure well    Post vital signs: stable    Level of consciousness: alert, oriented and awake    Nausea/Vomiting: no nausea/vomiting    Complications: none    Transfer of care protocol was followed      Last vitals: Visit Vitals  /64 (BP Location: Left arm, Patient Position: Lying)   Pulse 70   Temp 36.7 °C (98.1 °F) (Temporal)   Resp 20   Ht 5' 6.93" (1.7 m)   Wt 91.4 kg (201 lb 8 oz)   SpO2 (!) 89%   Breastfeeding No   BMI 31.63 kg/m²     "

## 2024-03-15 NOTE — Clinical Note
The catheter was repositioned into the superior vena cava. Hemodynamics were performed.  The patient's O2 saturation measured 71%.

## 2024-03-15 NOTE — Clinical Note
The catheter was repositioned into the right pulmonary artery. Hemodynamics were performed.  The patient's O2 saturation measured 72%.

## 2024-03-15 NOTE — H&P
[facility] - Cath Lab  Pediatric Cardiology  History and Physical     Patient Name: Jil Lennon  MRN: [id number]  Admission Date: 3/15/2024  Code Status: Prior   Attending Provider: Jimi Pollard Jr., MD   Primary Cardiologist: Dr. White  Primary Care Physician: Alberto Swain MD  Principal Problem:<principal problem not specified>    Subjective:     Chief Complaint:  HLHS s/p re-fenestrated Fontan     HPI:  23 yr old female HLHS s/p Fontan and re-fenestrated Fontan with recent concern of TIA admitted for cardiac cath.    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    Chylothorax     Congenital absence of pulmonary artery     to [place]    Dyspnea     Encounter for blood transfusion     Fracture of wrist     x4    General anesthetics causing adverse effect in therapeutic use     was mean as a child when waking up after tonsillectomy    Hypoplastic left heart     Liver disease     enlarged liver    Obstructive sleep apnea     resolved by T&A    Obstructive sleep apnea (adult) (pediatric)     Post-Fontan protein-losing enteropathy     Stroke     mother states possibly had stroke at 4 y/o    TIA (transient ischemic attack) 10/03/2023    Tonsillar and adenoid hypertrophy        Past Surgical History:   Procedure Laterality Date    BIDIRECTIONAL JOSE W/ ATRIAL SEPTECTOMY      [facility]    CARDIAC SURGERY      CATHETER REFENESTRATION  1/11/2005     [facility]    COARCTATION STENT  3/9/11    COLONOSCOPY N/A 5/27/2021    Procedure: COLONOSCOPY;  Surgeon: Benigno Bowers MD;  Location: [facility] ENDO ([facility] FLR);  Service: Endoscopy;  Laterality: N/A;    COMBINED RIGHT AND RETROGRADE LEFT HEART CATHETERIZATION FOR CONGENITAL HEART DEFECT N/A 3/7/2019    Procedure: CATHETERIZATION, HEART, COMBINED RIGHT AND RETROGRADE LEFT, FOR CONGENITAL HEART DEFECT;  Surgeon: Jimi Pollard Jr., MD;  Location: [facility] CATH LAB;  Service: Cardiology;  Laterality:  N/A;  ped heart    ESOPHAGOGASTRODUODENOSCOPY N/A 5/27/2021    Procedure: EGD (ESOPHAGOGASTRODUODENOSCOPY);  Surgeon: Benigno Bowers MD;  Location: [facility] ([address] [place] [address]);  Service: Endoscopy;  Laterality: N/A;  Patient will need pediatric heart anesthesiologist due to history of Fontan     mother states having formed stool         COVID test at [facility] on 5/24-GT    FONTAN PROCEDURE, EXTRACARDIAC  9/27/2004    [facility]    L[facility]     [facility]    L[facility] STENT  2/26/.2009    [facility]    narwood/ mitzi  age 3 days     done at [facility]    TONSILLECTOMY, ADENOIDECTOMY  11/2011    WISDOM TOOTH EXTRACTION         Review of patient's allergies indicates:   Allergen Reactions    Adhesive Dermatitis       No current facility-administered medications on file prior to encounter.     Current Outpatient Medications on File Prior to Encounter   Medication Sig    aspirin 81 MG Chew Take 81 mg by mouth every evening.    digoxin (LANOXIN) 125 mcg tablet Take 1 tablet (0.125 mg total) by mouth every evening.    enalapril (VASOTEC) 2.5 MG tablet TAKE 1 TABLET BY MOUTH TWICE A DAY    furosemide (LASIX) 20 MG tablet Take 1 tablet (20 mg total) by mouth 2 (two) times daily.    sertraline (ZOLOFT) 50 MG tablet Take 100 mg by mouth once daily.    sildenafil (REVATIO) 20 mg Tab Take 1 tablet (20 mg total) by mouth 2 (two) times daily. (pt taking twice a day)    spironolactone (ALDACTONE) 25 MG tablet Take 1 tablet (25 mg total) by mouth 2 (two) times daily.    tretinoin (RETIN-A) 0.025 % gel Apply topically every evening.     Family History       Problem Relation (Age of Onset)    Cancer Other (80)    Glaucoma Paternal Grandmother    Hypertension Maternal Grandfather, Paternal Grandmother    No Known Problems Father, Maternal Grandmother, Sister, Brother, Maternal Aunt, Maternal Uncle, Paternal Aunt, Paternal Uncle, Paternal Grandfather    Skin cancer Maternal Grandfather    Thyroid disease Mother    Urologic Abnormality Other           Social History     Social History Narrative    Parents . Lives with mother and step father, 2 cats, 2 sugar gliders,    Attending EMT training     Review of Systems   All other systems reviewed and are negative.    Objective:     Vital Signs (Most Recent):  Temp: 98.1 °F (36.7 °C) (03/15/24 0934)  Pulse: 70 (03/15/24 0934)  Resp: 20 (03/15/24 0934)  BP: 117/64 (03/15/24 0934)  SpO2: (!) 89 % (baseline sats high 80's) (03/15/24 0934) Vital Signs (24h Range):  Temp:  [98.1 °F (36.7 °C)] 98.1 °F (36.7 °C)  Pulse:  [70] 70  Resp:  [20] 20  SpO2:  [89 %] 89 %  BP: (117)/(64) 117/64     Weight: 91.4 kg (201 lb 8 oz)  Body mass index is 31.63 kg/m².    SpO2: (!) 89 % (baseline sats high 80's)       No intake or output data in the 24 hours ending 03/15/24 1026    Lines/Drains/Airways       Peripheral Intravenous Line  Duration                  Peripheral IV - Single Lumen 03/15/24 1017 20 G Right Forearm <1 day                       Physical Exam  Constitutional:       Appearance: Normal appearance.   HENT:      Head: Normocephalic and atraumatic.      Nose: Nose normal.      Mouth/Throat:      Mouth: Mucous membranes are moist.   Cardiovascular:      Rate and Rhythm: Normal rate.   Pulmonary:      Effort: Pulmonary effort is normal.      Breath sounds: Normal breath sounds.   Musculoskeletal:         General: Normal range of motion.      Cervical back: Normal range of motion and neck supple.   Skin:     General: Skin is warm.      Capillary Refill: Capillary refill takes less than 2 seconds.   Neurological:      General: No focal deficit present.      Mental Status: She is alert.            Significant Labs: BMP:   Glucose (mg/dL)   Date/Time Value Status   10/09/2023 12:32 PM 82 Final     Sodium (mmol/L)   Date/Time Value Status   10/09/2023 12:32  Final     Sodium Level (mmol/L)   Date/Time Value Status   02/19/2024 09:37  Final     Potassium (mmol/L)   Date/Time Value Status   10/09/2023  12:32 PM 4.6 Final     Potassium Level (mmol/L)   Date/Time Value Status   02/19/2024 09:37 AM 4.0 Final     Chloride (mmol/L)   Date/Time Value Status   10/09/2023 12:32  Final     CO2 (mmol/L)   Date/Time Value Status   10/09/2023 12:32 PM 22 (L) Final     Carbon Dioxide (mmol/L)   Date/Time Value Status   02/19/2024 09:37 AM 24 Final     BUN (mg/dL)   Date/Time Value Status   10/09/2023 12:32 PM 17 Final     Blood Urea Nitrogen (mg/dL)   Date/Time Value Status   02/19/2024 09:37 AM 13.1 Final     Creatinine (mg/dL)   Date/Time Value Status   02/19/2024 09:37 AM 0.74 Final   10/09/2023 12:32 PM 0.7 Final     Calcium (mg/dL)   Date/Time Value Status   10/09/2023 12:32 PM 10.1 Final     Calcium Level Total (mg/dL)   Date/Time Value Status   02/19/2024 09:37 AM 9.7 Final     Magnesium (mg/dL)   Date/Time Value Status   08/20/2021 10:39 AM 1.8 Final     Magnesium Level (mg/dL)   Date/Time Value Status   10/04/2023 01:14 PM 1.90 Final    and CBC   WBC   Date/Time Value Status   02/19/2024 09:37 AM 8.29 x10(3)/mcL Final   10/09/2023 12:32 PM 10.62 K/uL Final     Hgb (g/dL)   Date/Time Value Status   02/19/2024 09:37 AM 17.0 (H) Final     Hemoglobin (g/dL)   Date/Time Value Status   10/09/2023 12:32 PM 18.5 (H) Final     POC Hematocrit (%PCV)   Date/Time Value Status   10/03/2023 04:09 PM 52 Final     Hematocrit (%)   Date/Time Value Status   10/09/2023 12:32 PM 52.2 (H) Final     Hct (%)   Date/Time Value Status   02/19/2024 09:37 AM 48.0 (H) Final     MCV (fL)   Date/Time Value Status   02/19/2024 09:37 AM 91.3 Final   10/09/2023 12:32 PM 91 Final     Platelets (K/uL)   Date/Time Value Status   10/09/2023 12:32  Final     Platelet (x10(3)/mcL)   Date/Time Value Status   02/19/2024 09:37  Final       Significant Imaging:  None  Assessment and Plan:     Cardiac/Vascular  S/P Fontan procedure  23 yr old female HLHS s/p Fontan and re-fenestrated Fontan with recent concern of TIA admitted for cardiac  cath.    Plan:    To cath lab for right/left heart cath.    Elenita Da Silva III, MD  Pediatric Cardiology  Interventional Cardiology  [facility]  [address] [place] [address]          Elenita Da Silva MD  Pediatric Cardiology   [facility] - Cath Lab

## 2024-03-15 NOTE — ASSESSMENT & PLAN NOTE
23 yr old female HLHS s/p Fontan and re-fenestrated Fontan with recent concern of TIA admitted for cardiac cath.    Plan:    To cath lab for right/left heart cath.    Elenita Da Silva III, MD  Pediatric Cardiology  Interventional Cardiology  [facility]  [address] [place] [address]

## 2024-03-15 NOTE — Clinical Note
The catheter was inserted into the COLLATERAL. The angiography was performed via hand injection with 2 mL of contrast.

## 2024-03-15 NOTE — ANESTHESIA PREPROCEDURE EVALUATION
Pre-operative evaluation for Procedure(s) (LRB):  Catheterization, Heart, Combined Right and Retrograde Left, for Congenital Heart Defect (N/A)    Jil Lennon is a 23 y.o. female with pmh of HLHS (s/p Fontan), PAPITO, MDD, diffuse pulmonary AVMs who presents with a recent TIA. Plan for the above procedure.     Cardiac MRI 11/2023:  23 y.o. 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  The right superior cavo-pulmonary anastomosis is unobstructed. The bilateral internal jugular veins and left innominate vein are patent.  The Fontan conduit is stented, it appears unobstructed, no evidence of thrombus. The inferior cavo-pulmonary anastomosis is widely patent. There is a patent fenestration with right to left shunting best seen on axial cine stack.  The right pulmonary artery is unobstructed. The left pulmonary artery is stented and appears smaller in size (at least mildly hypoplastic).  The right ventricle is moderately dilated. The calculated RVEF is 51%.  The left ventricle is severely hypoplastic (diminutive). No evidence of thrombus.  The Damus Kim Stansel is unobstructed. The reconstructed transverse arch appears unobstructed. There is a stent in the proximal descending aorta with dephasing of flow on cine images suggestive of stenosis.  There are tiny tortuous collaterals vessels primarily in the region of the innominate artery, innominate vein and SVC. The collaterals appear to terminate in the right lung and possible right pulmonary veins. The right and left internal mammary arteries are dilated.  Phase contrast imaging demonstrates that 36% of aortic blood flow is lost in the form of collaterals and 39% of pulmonary blood flow gained from collateral flow.  T2 MRCP lymphangiogram performed is consistent with type  3 lymphatic classification with extension into the mediastinum.  No prior cardiac MRI for comparison.    2D Echo:  7/2023:  Hypoplastic left heart syndrome - s/p extracardiac Fontan, left pulmonary artery stent, coarctation stent. The study was technically difficult with many images being suboptimal in quality. No significant change from last echocardiogram. There is low velocity, phasic flow in the right superior vena cava, Abel anastomosis, right pulmonary artery, inferior vena cava to Fontan conduit and Fontan anastomosis. There is a stent in the pulmonary confluence extending to the proximal left pulmonary artery with limited color Doppler demonstrating no obvious stenosis. Limited color Doppler image consistent with small systemic to pulmonary venous shunt at Fontan fenestration with no phasic Doppler imaging to evaluate pressure gradient. Pulmonary venous anatomy demonstrated as follows: One right and one left pulmonary vein. Small left atrium. Normal right atrial size. There is no obvious obstruction of pulmonary venous return to the left atrium across the atrial septal defect to the systemic tricuspid valve. Normal tricuspid valve. Mild tricuspid valve insufficiency. Qualitatively the right ventricle is moderately dilated and hypertrophied with normal systolic function. Normal neoaortic valve velocity. Mild neoaortic valve insufficiency. Large reconstructed aortic arch with narrowing in the distal reconstruction. Limited images suggest no obvious obstruction in DKS anastomosis. A stent is noted in proximal descending aorta with peak velocity 2.4 m/sec, mean gradient 10 mmHg and no diastolic runoff (unchanged from previous study). No pericardial effusion.     Patient Active Problem List   Diagnosis    Exercise intolerance    Cyanosis    Personal history of surgery to heart and great vessels, presenting hazards to health    Aorta coarctation    Pulmonary artery stenosis of central branch    Crowded optic disc  - Both Eyes    HLHS (hypoplastic left heart syndrome)    AVM (arteriovenous malformation)    Congenital absence of pulmonary artery    Anxiety    Palpitations    Chondromalacia of right patella    Adolescent idiopathic scoliosis of lumbar region    Post-Fontan protein-losing enteropathy    S/P Fontan procedure    Spondylolysis    Major depressive disorder in remission    Costochondritis    Intrauterine device    Borderline personality disorder in adolescent    Chronic passive congestion of liver (FALD)    High Bilateral Myopia     Regular astigmatism of left eye    Left wrist pain    TIA (transient ischemic attack)    Optic disc drusen, bilateral        No current facility-administered medications on file prior to encounter.     Current Outpatient Medications on File Prior to Encounter   Medication Sig Dispense Refill    aspirin 81 MG Chew Take 81 mg by mouth every evening.      digoxin (LANOXIN) 125 mcg tablet Take 1 tablet (0.125 mg total) by mouth every evening. 90 tablet 10    enalapril (VASOTEC) 2.5 MG tablet TAKE 1 TABLET BY MOUTH TWICE A DAY 60 tablet 9    furosemide (LASIX) 20 MG tablet Take 1 tablet (20 mg total) by mouth 2 (two) times daily. 60 tablet 10    sertraline (ZOLOFT) 50 MG tablet Take 100 mg by mouth once daily.      sildenafil (REVATIO) 20 mg Tab Take 1 tablet (20 mg total) by mouth 2 (two) times daily. (pt taking twice a day) 60 tablet 6    spironolactone (ALDACTONE) 25 MG tablet Take 1 tablet (25 mg total) by mouth 2 (two) times daily. 60 tablet 9    tretinoin (RETIN-A) 0.025 % gel Apply topically every evening.         Past Surgical History:   Procedure Laterality Date    BIDIRECTIONAL JOSE W/ ATRIAL SEPTECTOMY      [facility]    CARDIAC SURGERY      CATHETER REFENESTRATION  1/11/2005     [facility]    COARCTATION STENT  3/9/11    COLONOSCOPY N/A 5/27/2021    Procedure: COLONOSCOPY;  Surgeon: Benigno Bowers MD;  Location: [facility] ([address] [place] [address]);  Service: Endoscopy;  Laterality: N/A;    COMBINED  RIGHT AND RETROGRADE LEFT HEART CATHETERIZATION FOR CONGENITAL HEART DEFECT N/A 3/7/2019    Procedure: CATHETERIZATION, HEART, COMBINED RIGHT AND RETROGRADE LEFT, FOR CONGENITAL HEART DEFECT;  Surgeon: Jimi Pollard Jr., MD;  Location: [facility] CATH LAB;  Service: Cardiology;  Laterality: N/A;  ped heart    ESOPHAGOGASTRODUODENOSCOPY N/A 5/27/2021    Procedure: EGD (ESOPHAGOGASTRODUODENOSCOPY);  Surgeon: Benigno Bowers MD;  Location: [facility] ENDO (2ND FLR);  Service: Endoscopy;  Laterality: N/A;  Patient will need pediatric heart anesthesiologist due to history of Fontan     mother states having formed stool         COVID test at [facility] on 5/24-GT    FONTAN PROCEDURE, EXTRACARDIAC  9/27/2004    [facility]    LPA     RE CONSTRUCTION      [facility]    LPA STENT  2/26/.2009    [facility]    [place]/ mitzi  age 4 days     done at [facility]    TONSILLECTOMY, ADENOIDECTOMY  11/2011    WISDOM TOOTH EXTRACTION             Pre-op Assessment    I have reviewed the Patient Summary Reports.     I have reviewed the Nursing Notes. I have reviewed the NPO Status.   I have reviewed the Medications.     Review of Systems  Anesthesia Hx:    System negative unless otherwise specified in the HPI or problem list above           Denies Family Hx of Anesthesia complications.    Denies Personal Hx of Anesthesia complications.                    Hematology/Oncology:                   Hematology Comments: System negative unless otherwise specified in the HPI or problem list above                Oncology Comments: System negative unless otherwise specified in the HPI or problem list above     EENT/Dental:   System negative unless otherwise specified in the HPI or problem list above          Cardiovascular:                    System negative unless otherwise specified in the HPI or problem list above                         Pulmonary:         System negative unless otherwise specified in the HPI or problem list above               Renal/:     System  negative unless otherwise specified in the HPI or problem list above             Hepatic/GI:        System negative unless otherwise specified in the HPI or problem list above          Musculoskeletal:     System negative unless otherwise specified in the HPI or problem list above            OB/GYN/PEDS:          System negative unless otherwise specified in the HPI or problem list above   Neurological:           System negative unless otherwise specified in the HPI or problem list above                            Endocrine:     System negative unless otherwise specified in the HPI or problem list above        Dermatological:  System negative unless otherwise specified in the HPI or problem list above   Psych:     System negative unless otherwise specified in the HPI or problem list above               Physical Exam  General: Well nourished, Cooperative, Alert and Oriented    Airway:  Mouth Opening: Normal  TM Distance: Normal  Tongue: Normal  Neck ROM: Normal ROM    Chest/Lungs:  Clear to auscultation, Normal Respiratory Rate    Heart:  Rate: Normal  Rhythm: Regular Rhythm  Sounds: Normal        Anesthesia Plan  Type of Anesthesia, risks & benefits discussed:    Anesthesia Type: MAC, Gen Natural Airway, Gen ETT  Intra-op Monitoring Plan: Standard ASA Monitors  Post Op Pain Control Plan: multimodal analgesia  Induction:  IV  Informed Consent: Informed consent signed with the Patient and all parties understand the risks and agree with anesthesia plan.  All questions answered.   ASA Score: 3  Day of Surgery Review of History & Physical: H&P Update referred to the surgeon/provider.    Ready For Surgery From Anesthesia Perspective.     .

## 2024-03-15 NOTE — Clinical Note
72 ml of contrast were injected throughout the case. 128 mL of contrast was the total wasted during the case. 200 mL was the total amount used during the case.

## 2024-03-15 NOTE — PLAN OF CARE
Patient remains sedated following cath procedure. Cath site dressing CDI with 2+ pulses in feet. Skin warm with CRT 2-3 seconds. Plan to remain flat until 15:30 and discharge once criteria is met. Caregivers at bedside. Plan of care reviewed and questions answered.

## 2024-03-15 NOTE — HPI
23 yr old female HLHS s/p Fontan and re-fenestrated Fontan with recent concern of TIA admitted for cardiac cath.

## 2024-03-18 LAB
GLUCOSE SERPL-MCNC: 108 MG/DL (ref 70–110)
HCO3 UR-SCNC: 22.2 MMOL/L (ref 24–28)
HCT VFR BLD CALC: 39 %PCV (ref 36–54)
PCO2 BLDA: 45.6 MMHG (ref 35–45)
PH SMN: 7.3 [PH] (ref 7.35–7.45)
PO2 BLDA: 68 MMHG (ref 80–100)
POC ACTIVATED CLOTTING TIME K: 201 SEC (ref 74–137)
POC ACTIVATED CLOTTING TIME K: 261 SEC (ref 74–137)
POC BE: -4 MMOL/L
POC IONIZED CALCIUM: 1.3 MMOL/L (ref 1.06–1.42)
POC SATURATED O2: 91 % (ref 95–100)
POC TCO2: 24 MMOL/L (ref 23–27)
POTASSIUM BLD-SCNC: 3.8 MMOL/L (ref 3.5–5.1)
SAMPLE: ABNORMAL
SODIUM BLD-SCNC: 141 MMOL/L (ref 136–145)

## 2024-03-18 NOTE — ANESTHESIA POSTPROCEDURE EVALUATION
Anesthesia Post Evaluation    Patient: Jli Lennon    Procedure(s) Performed: Procedure(s) (LRB):  Catheterization, Heart, Combined Right and Retrograde Left, for Congenital Heart Defect (N/A)  Aortogram, Pediatric  VENOGRAM, ANOMALOUS OR PERSISTENT VENA CAVA  VENOGRAM, VENOVENOUS COLLATERALS ORIGINATING BELOW THE HEART    Final Anesthesia Type: MAC      Patient location during evaluation: PACU  Patient participation: Yes- Able to Participate  Level of consciousness: awake and alert  Post-procedure vital signs: reviewed and stable  Pain management: adequate  Airway patency: patent    PONV status at discharge: No PONV  Anesthetic complications: no      Cardiovascular status: blood pressure returned to baseline  Respiratory status: unassisted  Hydration status: euvolemic  Follow-up not needed.              Vitals Value Taken Time   /56 03/15/24 1531   Temp 36.7 °C (98.1 °F) 03/15/24 1530   Pulse 82 03/15/24 1533   Resp 20 03/15/24 1533   SpO2 85 % 03/15/24 1533   Vitals shown include unvalidated device data.      No case tracking events are documented in the log.      Pain/Rosana Score: No data recorded

## 2024-03-18 NOTE — PROCEDURE NOTE ADDENDUM
Certification of Assistant at Surgery       Surgery Date: 3/15/2024     Participating Surgeons:  Surgeon(s) and Role:     * Jimi Pollard Jr., MD - Primary     * Elenita Da Silva III., MD - Assisting    Procedures:  Procedure(s) (LRB):  Catheterization, Heart, Combined Right and Retrograde Left, for Congenital Heart Defect (N/A)  Aortogram, Pediatric  VENOGRAM, ANOMALOUS OR PERSISTENT VENA CAVA  VENOGRAM, VENOVENOUS COLLATERALS ORIGINATING BELOW THE HEART    Assistant Surgeon's Certification of Necessity:  I understand that section 1842 (b) (6) (d) of the Social Security Act generally prohibits Medicare Part B reasonable charge payment for the services of assistants at surgery in teaching hospitals when qualified residents are available to furnish such services. I certify that the services for which payment is claimed were medically necessary, and that no qualified resident was available to perform the services. I further understand that these services are subject to post-payment review by the Medicare carrier.      Elenita Da Silva MD    03/18/2024  11:34 AM

## 2024-03-27 ENCOUNTER — OFFICE VISIT (OUTPATIENT)
Dept: PEDIATRIC GASTROENTEROLOGY | Facility: CLINIC | Age: 24
End: 2024-03-27
Payer: COMMERCIAL

## 2024-03-27 ENCOUNTER — PATIENT MESSAGE (OUTPATIENT)
Dept: PEDIATRIC GASTROENTEROLOGY | Facility: CLINIC | Age: 24
End: 2024-03-27

## 2024-03-27 DIAGNOSIS — Z98.890 S/P FONTAN PROCEDURE: ICD-10-CM

## 2024-03-27 DIAGNOSIS — K76.1 CHRONIC PASSIVE CONGESTION OF LIVER: Primary | ICD-10-CM

## 2024-03-27 PROCEDURE — 99214 OFFICE O/P EST MOD 30 MIN: CPT | Mod: 95,,, | Performed by: PEDIATRICS

## 2024-03-27 NOTE — LETTER
March 27, 2024        Alberto Swain MD  [address] [place] [address]  [address]  [facility] [address]             [facility]  [address]  [place] [address]  Phone: [phone]   Patient: Jil Lennon   MR Number: [id number]   YOB: 2000   Date of Visit: 3/27/2024       Dear Dr. Swain:    Thank you for referring Jil Lennon to me for evaluation. Attached you will find relevant portions of my assessment and plan of care.    If you have questions, please do not hesitate to call me. I look forward to following Jil Lennon along with you.    Sincerely,      Tl Ac MD            CC  No Recipients    Enclosure

## 2024-03-27 NOTE — PROGRESS NOTES
Subjective:      Patient ID: Jil Lennon is a 23 y.o. female.    Chief Complaint: No chief complaint on file.    Complications of Liver Disease  1. Ascites:  no  2. SBP:  no  3. Varices: unknown   4. Acute variceal hemorrhage:  no  5. Encephalopathy:  no  6. Hepatorenal syndrome:  no  7. Hepatopulmonary syndrome:  no  8. Growth failure:  no  9. Cholangitis:  no  10. Pathological fracture:  no  11. Pruritus:  no    Liver-related Investigations and Screening  1. Liver biopsy:  no  2. EGD:  no  3. Colonoscopy:  no  4. ERCP:  no  5. Paracentesis:  no  6. PTC:    no  7. US: 3/2024-HM 18.4 cm, no FLL, no ascites, spleen 13.3 cm, 1.38 m/s  12/2022-no FLL, no HM, mild SM (12.7), 1.51 m/s, 6.96 kPa  4/2021-no FLL, +HSM, elastog 1.3 m/s, 5.4 kPa  10/1/2019-no ascites, no FLLs, spleen ULN size, baseline elastography 1.3 m/s (F0-1)  8. MR:  no  9. AFP: 1.9 (4/2021), <2 (2/2022),     Liver-related Medications  none    Calculated PELD/MELD:  MELD 3.0: 8 at 10/5/2023  4:38 AM  MELD-Na: 7 at 10/5/2023  4:38 AM  Calculated from:  Serum Creatinine: 0.67 mg/dL (Using min of 1 mg/dL) at 10/5/2023  4:38 AM  Serum Sodium: 139 mmol/L (Using max of 137 mmol/L) at 10/5/2023  4:38 AM  Total Bilirubin: 0.9 mg/dL (Using min of 1 mg/dL) at 10/5/2023  4:38 AM  Serum Albumin: 4.1 g/dL (Using max of 3.5 g/dL) at 10/5/2023  4:38 AM  INR(ratio): 1.1 at 10/4/2023  1:14 PM  Age at listing (hypothetical): 23 years  Sex: Female at 10/5/2023  4:38 AM         [facility]  Telehepatology    23 yr old female with Fontan palliation of HLHS seen in follow-up for FALD.    Feeling well overall.  She had a TIA unexpectedly, possibly related to fenestration of her circuit.  Had a cath and echo in last few months and good results reported for both.  She had her US scans earlier this month but still needs labs.      The patient location is: home  The chief complaint leading to consultation is:     Visit type: audiovisual    Face to Face  time with patient: 8  30 minutes of total time spent on the encounter, which includes face to face time and non-face to face time preparing to see the patient (eg, review of tests), Obtaining and/or reviewing separately obtained history, Documenting clinical information in the electronic or other health record, Independently interpreting results (not separately reported) and communicating results to the patient/family/caregiver, or Care coordination (not separately reported).         Each patient to whom he or she provides medical services by telemedicine is:  (1) informed of the relationship between the physician and patient and the respective role of any other health care provider with respect to management of the patient; and (2) notified that he or she may decline to receive medical services by telemedicine and may withdraw from such care at any time.          Review of Systems    Objective:   Physical Exam    Labs/Imaging:     She will do later this month.    Assessment:     1. Chronic passive congestion of liver (FALD)    2. S/P Fontan procedure      Plan:   23 y.o. woman with Fontan physiology related to palliation of HLHS seen in follow-up for FALD.    Congestive Hepatopathy  #  Borderline radiographic splenomegaly only  #  Liver stiffness measured by US elastography fairly constant since baseline scan in 2019 which is reassuring    At-risk for HCC  #  No focal liver lesions on US  #  AFP ordered      RTC ~1 year; repeat US exams around time of visit

## 2024-04-03 DIAGNOSIS — Q23.4 HLHS (HYPOPLASTIC LEFT HEART SYNDROME): Primary | ICD-10-CM

## 2024-04-03 DIAGNOSIS — Z98.890 S/P FONTAN PROCEDURE: ICD-10-CM

## 2024-04-04 ENCOUNTER — TELEPHONE (OUTPATIENT)
Dept: PEDIATRIC GASTROENTEROLOGY | Facility: CLINIC | Age: 24
End: 2024-04-04
Payer: COMMERCIAL

## 2024-04-04 NOTE — TELEPHONE ENCOUNTER
Called patient to inquire if labs were done.  Patient states that she has not gotten the done yet and is inquiring if they are urgent.  Patient further states that she will be in [place] on 5/13 and asks if she can just have labs drawn while she is here.  Informed Jil that we sent her a lab form on the portal if she would like to have them done at a local lab.  Jil states that she would rather have them done in [place] when she is in town.  Informed patient that I will ask Dr. Ac if this is okay.  No other question at this time.

## 2024-04-08 NOTE — TELEPHONE ENCOUNTER
Tl Ac MD Marzett, Brianna, RN  Caller: Unspecified (4 days ago,  3:54 PM)  Sure, no rush.  Just have her let us know when they're done in case we don't get the resutls.        Called patient to inform.  Further instructed to send message once completed.  Patient denies any questions at this time.

## 2024-04-15 ENCOUNTER — PATIENT MESSAGE (OUTPATIENT)
Dept: PEDIATRIC CARDIOLOGY | Facility: CLINIC | Age: 24
End: 2024-04-15
Payer: COMMERCIAL

## 2024-04-22 PROBLEM — G45.9 TIA (TRANSIENT ISCHEMIC ATTACK): Status: RESOLVED | Noted: 2023-10-03 | Resolved: 2024-04-22

## 2024-05-06 ENCOUNTER — HOSPITAL ENCOUNTER (OUTPATIENT)
Dept: PEDIATRIC CARDIOLOGY | Facility: HOSPITAL | Age: 24
Discharge: HOME | End: 2024-05-06
Attending: PEDIATRICS
Payer: COMMERCIAL

## 2024-05-06 ENCOUNTER — OFFICE VISIT (OUTPATIENT)
Dept: PEDIATRIC CARDIOLOGY | Facility: CLINIC | Age: 24
End: 2024-05-06
Attending: PEDIATRICS
Payer: COMMERCIAL

## 2024-05-06 VITALS
WEIGHT: 206.56 LBS | HEART RATE: 78 BPM | DIASTOLIC BLOOD PRESSURE: 60 MMHG | BODY MASS INDEX: 32.42 KG/M2 | SYSTOLIC BLOOD PRESSURE: 110 MMHG | OXYGEN SATURATION: 87 % | HEIGHT: 67 IN

## 2024-05-06 DIAGNOSIS — Z98.890 S/P FONTAN PROCEDURE: ICD-10-CM

## 2024-05-06 DIAGNOSIS — Z98.890 PERSONAL HISTORY OF SURGERY TO HEART AND GREAT VESSELS, PRESENTING HAZARDS TO HEALTH: ICD-10-CM

## 2024-05-06 DIAGNOSIS — Q23.4 HLHS (HYPOPLASTIC LEFT HEART SYNDROME): Primary | ICD-10-CM

## 2024-05-06 DIAGNOSIS — Z00.00 ANNUAL VISIT FOR GENERAL ADULT MEDICAL EXAMINATION WITHOUT ABNORMAL FINDINGS: Primary | ICD-10-CM

## 2024-05-06 DIAGNOSIS — K76.1 CHRONIC PASSIVE CONGESTION OF LIVER: ICD-10-CM

## 2024-05-06 DIAGNOSIS — Q25.1 AORTA COARCTATION: ICD-10-CM

## 2024-05-06 DIAGNOSIS — Q25.6 PULMONARY ARTERY STENOSIS OF CENTRAL BRANCH: ICD-10-CM

## 2024-05-06 DIAGNOSIS — Q25.79: ICD-10-CM

## 2024-05-06 DIAGNOSIS — Q27.30 AVM (ARTERIOVENOUS MALFORMATION): ICD-10-CM

## 2024-05-06 DIAGNOSIS — Q23.4 HLHS (HYPOPLASTIC LEFT HEART SYNDROME): ICD-10-CM

## 2024-05-06 LAB
AFP SERPL-MCNC: <2 NG/ML (ref 0–8.4)
ALBUMIN SERPL BCP-MCNC: 4.2 G/DL (ref 3.5–5.2)
ALP SERPL-CCNC: 70 U/L (ref 55–135)
ALT SERPL W/O P-5'-P-CCNC: 24 U/L (ref 10–44)
ANION GAP SERPL CALC-SCNC: 9 MMOL/L (ref 8–16)
AST SERPL-CCNC: 17 U/L (ref 10–40)
BASOPHILS # BLD AUTO: 0.05 K/UL (ref 0–0.2)
BASOPHILS NFR BLD: 0.7 % (ref 0–1.9)
BILIRUB DIRECT SERPL-MCNC: 0.4 MG/DL (ref 0.1–0.3)
BILIRUB SERPL-MCNC: 0.9 MG/DL (ref 0.1–1)
BSA FOR ECHO PROCEDURE: 2.1 M2
BUN SERPL-MCNC: 11 MG/DL (ref 6–20)
CALCIUM SERPL-MCNC: 9.8 MG/DL (ref 8.7–10.5)
CHLORIDE SERPL-SCNC: 109 MMOL/L (ref 95–110)
CO2 SERPL-SCNC: 23 MMOL/L (ref 23–29)
CREAT SERPL-MCNC: 0.8 MG/DL (ref 0.5–1.4)
DIFFERENTIAL METHOD BLD: ABNORMAL
EOSINOPHIL # BLD AUTO: 0.3 K/UL (ref 0–0.5)
EOSINOPHIL NFR BLD: 4.5 % (ref 0–8)
ERYTHROCYTE [DISTWIDTH] IN BLOOD BY AUTOMATED COUNT: 12.3 % (ref 11.5–14.5)
EST. GFR  (NO RACE VARIABLE): >60 ML/MIN/1.73 M^2
FERRITIN SERPL-MCNC: 68 NG/ML (ref 20–300)
GGT SERPL-CCNC: 54 U/L (ref 8–55)
GLUCOSE SERPL-MCNC: 83 MG/DL (ref 70–110)
HCT VFR BLD AUTO: 44.2 % (ref 37–48.5)
HGB BLD-MCNC: 15.5 G/DL (ref 12–16)
IMM GRANULOCYTES # BLD AUTO: 0.02 K/UL (ref 0–0.04)
IMM GRANULOCYTES NFR BLD AUTO: 0.3 % (ref 0–0.5)
IRON SERPL-MCNC: 108 UG/DL (ref 30–160)
LYMPHOCYTES # BLD AUTO: 1 K/UL (ref 1–4.8)
LYMPHOCYTES NFR BLD: 13.2 % (ref 18–48)
MCH RBC QN AUTO: 31.9 PG (ref 27–31)
MCHC RBC AUTO-ENTMCNC: 35.1 G/DL (ref 32–36)
MCV RBC AUTO: 91 FL (ref 82–98)
MONOCYTES # BLD AUTO: 0.6 K/UL (ref 0.3–1)
MONOCYTES NFR BLD: 7.8 % (ref 4–15)
NEUTROPHILS # BLD AUTO: 5.6 K/UL (ref 1.8–7.7)
NEUTROPHILS NFR BLD: 73.5 % (ref 38–73)
NRBC BLD-RTO: 0 /100 WBC
PLATELET # BLD AUTO: 196 K/UL (ref 150–450)
PMV BLD AUTO: 10.6 FL (ref 9.2–12.9)
POTASSIUM SERPL-SCNC: 4.1 MMOL/L (ref 3.5–5.1)
PROT SERPL-MCNC: 6.9 G/DL (ref 6–8.4)
RBC # BLD AUTO: 4.86 M/UL (ref 4–5.4)
SATURATED IRON: 29 % (ref 20–50)
SODIUM SERPL-SCNC: 141 MMOL/L (ref 136–145)
TOTAL IRON BINDING CAPACITY: 373 UG/DL (ref 250–450)
TRANSFERRIN SERPL-MCNC: 252 MG/DL (ref 200–375)
WBC # BLD AUTO: 7.59 K/UL (ref 3.9–12.7)

## 2024-05-06 PROCEDURE — 82105 ALPHA-FETOPROTEIN SERUM: CPT | Performed by: PEDIATRICS

## 2024-05-06 PROCEDURE — 36415 COLL VENOUS BLD VENIPUNCTURE: CPT | Performed by: PEDIATRICS

## 2024-05-06 PROCEDURE — 99215 OFFICE O/P EST HI 40 MIN: CPT | Mod: 25,S$GLB,, | Performed by: PEDIATRICS

## 2024-05-06 PROCEDURE — 99999 PR PBB SHADOW E&M-EST. PATIENT-LVL IV: CPT | Mod: PBBFAC,,, | Performed by: PEDIATRICS

## 2024-05-06 PROCEDURE — 80076 HEPATIC FUNCTION PANEL: CPT | Performed by: PEDIATRICS

## 2024-05-06 PROCEDURE — 80048 BASIC METABOLIC PNL TOTAL CA: CPT | Performed by: PEDIATRICS

## 2024-05-06 PROCEDURE — 93325 DOPPLER ECHO COLOR FLOW MAPG: CPT | Mod: 26,,, | Performed by: PEDIATRICS

## 2024-05-06 PROCEDURE — 82977 ASSAY OF GGT: CPT | Performed by: PEDIATRICS

## 2024-05-06 PROCEDURE — 83540 ASSAY OF IRON: CPT | Performed by: PEDIATRICS

## 2024-05-06 PROCEDURE — 93320 DOPPLER ECHO COMPLETE: CPT | Mod: 26,,, | Performed by: PEDIATRICS

## 2024-05-06 PROCEDURE — 93303 ECHO TRANSTHORACIC: CPT | Mod: 26,,, | Performed by: PEDIATRICS

## 2024-05-06 PROCEDURE — 93320 DOPPLER ECHO COMPLETE: CPT

## 2024-05-06 PROCEDURE — 82728 ASSAY OF FERRITIN: CPT | Performed by: PEDIATRICS

## 2024-05-06 PROCEDURE — 85025 COMPLETE CBC W/AUTO DIFF WBC: CPT | Performed by: PEDIATRICS

## 2024-05-06 NOTE — LETTER
May 6, 2024        Alberto Swain MD  [address] [place] [address]  [address]  [facility] [address]             [facility]  [address]  [facility] [address]  Phone: [phone]  Fax: [phone]   Patient: Jil Lennon   MR Number: [id number]   YOB: 2000   Date of Visit: 5/6/2024       Dear Dr. Swain:    Thank you for referring Jil Lennon to me for evaluation. Below are the relevant portions of my assessment and plan of care.    1. HLHS (hypoplastic left heart syndrome), s/p fenestrated Fontan   2. Aorta coarctation, relieved with stent    3. Pulmonary artery stenosis of central branch, relieved with stent    4. Diffuse pulmonary micro-AVMs (arteriovenous malformation)    5. 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 Post-Fontan protein-losing enteropathy, resolved    7. S/P Fontan procedure    8. History of TIA   9. S/P Fontan conduit stent   10. History of palpitations, quiescent   11. Anxiety/Depression        1. I reviewed today's findings in detail. We reviewed adult issues with CHD in detail including overall cardiac health, mortality issues, anticoagulation, contraception and pregnancy. Recommended change to eliquis 10 mg po every evening.  2. Same medications (digoxin, lasix, sildenafil, aldactone) otherwise.  3. SBE precautions prn.  4. Treat as normal from a cardiac standpoint, encouraged increased exercise.  5. Recheck in 6 months with ECG, ECHO and Holter,   6. Follow up with Dr. Ac today for liver evaluation, yearly thereafter.      If you have questions, please do not hesitate to call me. I look forward to following Jil along with you.    Sincerely,      Jimi Pollard Jr., MD           CC    No Recipients

## 2024-05-06 NOTE — PROGRESS NOTES
Subjective:    Patient ID:  Jil Lennon is a 23 y.o. female who presents for  scheduled follow-up. She came with her mom and grandmom today.    She had a neurologic event (TIA) a few months ago.    She has HLHS s/p staged palliation with late catheter based re-fenestration at 4 years of age for anasarca/PLE, coarctation stent, LPA stent and Fontan conduit stent (3/7/2019). She is doing very well overall now from a cardiac standpoint but has significant anxiety and eating disorder.     She has a history of abdominal complaints with small bowel bacterial overgrowth syndrome treated with antibiotics. She has an IUD.    She has not had recent lower extremity swelling.      Lluvia has had pleural effusions in the past and has very small diffuse pulmonary micro-AVMs, mild cyanosis and mild exercise intolerance. The fenestration remains of moderate size.The pulmonary micro-AVMs were less obvious on the recent cath and her pulmonary veins were fully saturated.     Several family members have thyroid problems.     Latest hepatology evaluation/follow up was unremarkable.     Review of Systems   Constitutional: Negative.   HENT: Negative.     Eyes: Negative.    Cardiovascular:  Positive for cyanosis.   Respiratory: Negative.     Endocrine: Negative.    Hematologic/Lymphatic: Negative.    Skin: Negative.    Musculoskeletal: Negative.    Gastrointestinal: Negative.    Genitourinary: Negative.    Neurological: Negative.    Psychiatric/Behavioral: Negative.     Allergic/Immunologic: Negative.         Objective:    Physical Exam  Constitutional:       General: She is not in acute distress.     Appearance: She is well-developed. She is not diaphoretic.      Comments: Mild cyanosis.   HENT:      Head: Normocephalic and atraumatic.      Right Ear: External ear normal.      Left Ear: External ear normal.      Nose: Nose normal.      Mouth/Throat:      Pharynx: No oropharyngeal exudate.   Eyes:      General: No scleral icterus.         Right eye: No discharge.         Left eye: No discharge.      Conjunctiva/sclera: Conjunctivae normal.      Pupils: Pupils are equal, round, and reactive to light.   Neck:      Thyroid: No thyromegaly.      Vascular: No JVD.      Trachea: No tracheal deviation.   Cardiovascular:      Rate and Rhythm: Normal rate.      Pulses: Intact distal pulses.           Radial pulses are 2+ on the right side.        Femoral pulses are 2+ on the right side.     Heart sounds: S1 normal. Murmur heard.      High-pitched blowing holosystolic murmur is present with a grade of 1/6 at the lower left sternal border. Single S2      No friction rub. No gallop.   Pulmonary:      Effort: Pulmonary effort is normal. No respiratory distress.      Breath sounds: Normal breath sounds. No stridor. No wheezing or rales.   Chest:      Chest wall: No tenderness.   Abdominal:      General: Bowel sounds are normal. There is no distension.      Palpations: Abdomen is soft. There is no mass.      Tenderness: There is no abdominal tenderness. There is no guarding or rebound.   Musculoskeletal:         General: No tenderness. Normal range of motion.      Cervical back: Normal range of motion and neck supple.   Lymphadenopathy:      Cervical: No cervical adenopathy.   Skin:     General: Skin is warm and dry.      Coloration: Skin is not pale.      Findings: No erythema or rash.   Neurological:      Mental Status: She is alert and oriented to person, place, and time.      Cranial Nerves: No cranial nerve deficit.      Motor: No abnormal muscle tone.      Coordination: Coordination normal.   Psychiatric:         Behavior: Behavior normal.         Thought Content: Thought content normal.         Judgment: Judgment normal.     Sats 82%        Liver US (4/19/2021): normal/unremarkable (mild hepato-splenomegaly as expected post-Fontan).  Labs (2/14/2022) unremarkable except continued mild erythrocythemia ([id number]) and improved total protein  (8.1)    ECG: NSR, RVH  ECHO: Hypoplastic left heart syndrome s/p Fontan. LPA stent, coarctation stent.  No significant change from last echocardiogram.  Laminar flow with no obvious thrombus or obstruction in left innominate vein , right SVC, IVC , Fontan conduit, pulmonary  confluence, stented LPA, proximal RPA, Fontan anastomosis or Abel anastomosis.  LPA distal to stent with laminar flow by color doppler. .  Color doppler demonstrates Fontan fenestration with continuous flow to right atrium at peak velocity <1.5 m/sec. and mean  gradient <4.5 mmHg.  Unobstructed return of pulmonary venous return across large atrial communication to tricuspid valve  Dilated right ventricle, mild.  Thickened right ventricle free wall, moderate.  Qualitatively good systemic right ventricular systolic function, appears slightly improved.  Normal neoaortic valve velocity.  Mild neoaortic valve insufficiency.  Large ascending aorta post Jackie.  Stent noted in descending aorta with peak velocity <2.4 m/sec. and trivial diastolic run off.  No pericardial effusion.    Assessment:       1. HLHS (hypoplastic left heart syndrome), s/p fenestrated Fontan   2. Aorta coarctation, relieved with stent    3. Pulmonary artery stenosis of central branch, relieved with stent    4. Diffuse pulmonary micro-AVMs (arteriovenous malformation)    5. 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 Post-Fontan protein-losing enteropathy, resolved    7. S/P Fontan procedure    8. History of TIA   9. S/P Fontan conduit stent   10. History of palpitations, quiescent   11. Anxiety/Depression        Plan:       1. I reviewed today's findings in detail. We reviewed adult issues with CHD in detail including overall cardiac health, mortality issues, anticoagulation, contraception and pregnancy. Recommended change to eliquis 10 mg po every evening.  2. Same medications (digoxin, lasix, sildenafil, aldactone) otherwise.  3. SBE precautions prn.  4. Treat as normal  from a cardiac standpoint, encouraged increased exercise.  5. Recheck in 6 months with ECG, ECHO and Holter,   6. Follow up with Dr. Ac today for liver evaluation, yearly thereafter.

## 2024-05-07 ENCOUNTER — PATIENT MESSAGE (OUTPATIENT)
Dept: NEUROLOGY | Facility: CLINIC | Age: 24
End: 2024-05-07
Payer: COMMERCIAL

## 2024-05-16 ENCOUNTER — PATIENT MESSAGE (OUTPATIENT)
Dept: PEDIATRIC GASTROENTEROLOGY | Facility: CLINIC | Age: 24
End: 2024-05-16
Payer: COMMERCIAL

## 2024-05-17 DIAGNOSIS — H47.093 DISORDER OF OPTIC NERVE OF BOTH EYES: Primary | ICD-10-CM

## 2024-05-24 ENCOUNTER — TELEPHONE (OUTPATIENT)
Dept: OPHTHALMOLOGY | Facility: CLINIC | Age: 24
End: 2024-05-24
Payer: COMMERCIAL

## 2024-05-24 NOTE — TELEPHONE ENCOUNTER
"----- Message from Eloisa Corral MD sent at 5/20/2024  7:55 AM CDT -----  Of course,    We will get her into clinic with OCT and HVF!    RC  ----- Message -----  From: Petar Mcelroy, OD  Sent: 5/17/2024   4:39 PM CDT  To: MD DANE Santamaria,  This is a 23 y.o whom I've been seeing since she was 7 y.o. She has crowded discs or "disc at risk," and is treated with Sildenafil for congenital heart disease. I've been monitoring her q 6 months for several years. Her RNFL on OCT's has always been ONL, however, her recent OCT shows noticeable progression. Her visual field (OS) is also showing early altitudinal defects. Would it be possible for you to evaluate Ovidio?    Petar :-)  "

## 2024-07-18 ENCOUNTER — TELEPHONE (OUTPATIENT)
Dept: URGENT CARE | Facility: CLINIC | Age: 24
End: 2024-07-18

## 2024-07-18 ENCOUNTER — OFFICE VISIT (OUTPATIENT)
Dept: URGENT CARE | Facility: CLINIC | Age: 24
End: 2024-07-18
Payer: COMMERCIAL

## 2024-07-18 ENCOUNTER — PATIENT MESSAGE (OUTPATIENT)
Dept: OPTOMETRY | Facility: CLINIC | Age: 24
End: 2024-07-18
Payer: COMMERCIAL

## 2024-07-18 VITALS
TEMPERATURE: 98 F | DIASTOLIC BLOOD PRESSURE: 79 MMHG | OXYGEN SATURATION: 89 % | SYSTOLIC BLOOD PRESSURE: 116 MMHG | HEART RATE: 89 BPM | WEIGHT: 206 LBS | BODY MASS INDEX: 32.33 KG/M2 | RESPIRATION RATE: 16 BRPM

## 2024-07-18 DIAGNOSIS — H10.9 CONJUNCTIVITIS, UNSPECIFIED CONJUNCTIVITIS TYPE, UNSPECIFIED LATERALITY: Primary | ICD-10-CM

## 2024-07-18 PROCEDURE — 99213 OFFICE O/P EST LOW 20 MIN: CPT | Mod: ,,,

## 2024-07-18 RX ORDER — GENTAMICIN SULFATE 1 MG/G
OINTMENT TOPICAL
COMMUNITY
Start: 2024-05-23

## 2024-07-18 RX ORDER — LISDEXAMFETAMINE DIMESYLATE 30 MG/1
30 CAPSULE ORAL EVERY MORNING
COMMUNITY

## 2024-07-18 RX ORDER — OFLOXACIN 3 MG/ML
SOLUTION/ DROPS OPHTHALMIC
Qty: 10 ML | Refills: 0 | Status: SHIPPED | OUTPATIENT
Start: 2024-07-18 | End: 2024-07-25

## 2024-07-18 NOTE — PATIENT INSTRUCTIONS
Stressed importance of frequent handwashing and avoidance of touching the eye area.  If symptoms worsen or don't improve over the next 24-48 hours, return to the clinic or follow up with your eye doctor immediately.   Present to the Emergency Department with any significant change or worsening symptoms including facial swelling, pain, vision changes, sensitivity to light or pain with eye movement, fever, body aches, or chills.      Do not wear contacts until infection is cleared. Throw away your current contacts and your case and use a new pair when you do put them back in.

## 2024-07-18 NOTE — PROGRESS NOTES
Subjective:      Patient ID: Jil Lennon is a 23 y.o. female.    Vitals:  weight is 93.4 kg (206 lb). Her tympanic temperature is 98 °F (36.7 °C). Her blood pressure is 116/79 and her pulse is 89. Her respiration is 16 and oxygen saturation is 89% (abnormal).     Chief Complaint: Eye Drainage     Patient is a 23 y.o. female who presents to urgent care with complaints of right eye irritation and redness with some scant mucoid drainage starting yesterday. She reports that she was wearing her contacts when the discomfort started and she quickly removed them.  Alleviating factors include warm compress with mild amount of relief. Patient denies fever, sinus congestion, significant pain with eye movement, vision changes, or pain/swelling around the eye. She reports mild sensitivity to light.       Constitution: Negative for chills and fever.   HENT: Negative.  Negative for congestion.    Neck: neck negative.   Eyes:  Positive for eye discharge, eye pain (eye irriration) and eye redness. Negative for eye trauma, foreign body in eye, eye itching, vision loss, double vision, blurred vision and eyelid swelling.      Objective:     Physical Exam   Constitutional: She is oriented to person, place, and time. She appears well-developed. She is cooperative.  Non-toxic appearance. She does not appear ill. No distress.   HENT:   Head: Normocephalic and atraumatic.   Ears:   Right Ear: Hearing and external ear normal.   Left Ear: Hearing and external ear normal.   Mouth/Throat: Oropharynx is clear and moist and mucous membranes are normal.   Eyes: Lids are normal. Pupils are equal, round, and reactive to light. Lids are everted and swept, no foreign bodies found. Right eye exhibits discharge (clear tearing noted). Right conjunctiva is injected.   Slit lamp exam:       The right eye shows no fluorescein uptake. vision grossly intact      Comments: Right conjunctival redness noted, clear drainage, no purulent drainage  appreciated    Proparacaine opthalmologic drop applied to right eye, fluorecin dye applied, no uptake of dye or appreciated foreign body. Eye flushed with 15cc NS, patient tolerated without complaint.    Neck: Trachea normal and phonation normal. Neck supple. No edema present. No erythema present.   Cardiovascular: Normal rate.   Pulmonary/Chest: Effort normal. No respiratory distress. She has no decreased breath sounds.   Abdominal: Normal appearance.   Neurological: She is alert and oriented to person, place, and time. She exhibits normal muscle tone.   Skin: Skin is warm, dry, intact, not diaphoretic and no rash. Capillary refill takes less than 2 seconds.   Psychiatric: Her speech is normal and behavior is normal. Mood normal.   Nursing note and vitals reviewed.      Assessment:     1. Conjunctivitis, unspecified conjunctivitis type, unspecified laterality        Plan:       Conjunctivitis, unspecified conjunctivitis type, unspecified laterality    Other orders  -     ofloxacin (OCUFLOX) 0.3 % ophthalmic solution; Place 2 drops into the right eye every 4 (four) hours for 2 days, THEN 2 drops every 4 (four) hours for 5 days.  Dispense: 10 mL; Refill: 0    Stressed importance of frequent handwashing and avoidance of touching the eye area.  If symptoms worsen or don't improve over the next 24-48 hours, return to the clinic or follow up with your eye doctor immediately.   Present to the Emergency Department with any significant change or worsening symptoms including facial swelling, pain, vision changes, sensitivity to light or pain with eye movement, fever, body aches, or chills.      Do not wear contacts until infection is cleared. Throw away your current contacts and your case and use a new pair when you do put them back in.

## 2024-07-18 NOTE — TELEPHONE ENCOUNTER
Spoke to pt regarding referral pt stated she will go to a Ophthalmology and will be seen tomorrow.

## 2024-07-29 ENCOUNTER — PATIENT MESSAGE (OUTPATIENT)
Dept: PRIMARY CARE CLINIC | Facility: CLINIC | Age: 24
End: 2024-07-29
Payer: COMMERCIAL

## 2024-08-02 ENCOUNTER — OFFICE VISIT (OUTPATIENT)
Dept: URGENT CARE | Facility: CLINIC | Age: 24
End: 2024-08-02
Payer: COMMERCIAL

## 2024-08-02 VITALS
SYSTOLIC BLOOD PRESSURE: 116 MMHG | BODY MASS INDEX: 32.96 KG/M2 | TEMPERATURE: 99 F | RESPIRATION RATE: 20 BRPM | HEART RATE: 82 BPM | DIASTOLIC BLOOD PRESSURE: 71 MMHG | WEIGHT: 210 LBS | HEIGHT: 67 IN | OXYGEN SATURATION: 86 %

## 2024-08-02 DIAGNOSIS — J02.9 SORE THROAT: Primary | ICD-10-CM

## 2024-08-02 DIAGNOSIS — J01.90 ACUTE SINUSITIS, RECURRENCE NOT SPECIFIED, UNSPECIFIED LOCATION: ICD-10-CM

## 2024-08-02 LAB
CTP QC/QA: YES
SARS-COV-2 AG RESP QL IA.RAPID: NEGATIVE

## 2024-08-02 RX ORDER — DOXYCYCLINE 100 MG/1
100 CAPSULE ORAL EVERY 12 HOURS
Qty: 20 CAPSULE | Refills: 0 | Status: SHIPPED | OUTPATIENT
Start: 2024-08-02 | End: 2024-08-12

## 2024-08-02 NOTE — PROGRESS NOTES
"Subjective:      Patient ID: Jil Lennon is a 24 y.o. female.    Vitals:  height is 5' 7" (1.702 m) and weight is 95.3 kg (210 lb). Her temperature is 98.7 °F (37.1 °C). Her blood pressure is 116/71 and her pulse is 82. Her respiration is 20 and oxygen saturation is 86% (abnormal).     Chief Complaint: Sore Throat, Cough, and Sinus Problem     Patient is a 24 y.o. female who presents to urgent care with complaints of loss of smell, ear pain L>R, congestion, HA, sneezing, sore throat, body aches and fatigue X2 days. Alleviating factors include N/A with no relief. She has been around her mom that is also sick. She denies fever, CP, N/V/D, cough, neck stiffness, rashes.       ROS   Objective:     Physical Exam   Constitutional: She is oriented to person, place, and time. She appears well-developed. She is cooperative.  Non-toxic appearance. She does not appear ill. No distress.   HENT:   Head: Normocephalic and atraumatic.   Ears:   Right Ear: Hearing, tympanic membrane, external ear and ear canal normal.   Left Ear: Hearing, external ear and ear canal normal. Tympanic membrane is not erythematous.   Nose: Nose normal. No nasal deformity. No epistaxis.   Mouth/Throat: Uvula is midline, oropharynx is clear and moist and mucous membranes are normal. No trismus in the jaw. Normal dentition. No uvula swelling. No oropharyngeal exudate, posterior oropharyngeal edema or posterior oropharyngeal erythema.   Eyes: Conjunctivae and lids are normal. No scleral icterus.   Neck: Trachea normal and phonation normal. Neck supple. No edema present. No erythema present. No neck rigidity present.   Cardiovascular: Normal rate, regular rhythm, normal heart sounds and normal pulses.   Pulmonary/Chest: Effort normal and breath sounds normal. No respiratory distress. She has no decreased breath sounds. She has no rhonchi.   Abdominal: Normal appearance.   Musculoskeletal: Normal range of motion.         General: No deformity. Normal " range of motion.   Neurological: She is alert and oriented to person, place, and time. She exhibits normal muscle tone. Coordination normal.   Skin: Skin is warm, dry, intact, not diaphoretic and not pale.   Psychiatric: Her speech is normal and behavior is normal. Judgment and thought content normal.   Nursing note and vitals reviewed.  SpO2 noted on intake.  Patient states this is her normal range.    Strep test negative     Previous History      Review of patient's allergies indicates:   Allergen Reactions    Adhesive Dermatitis       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    Chylothorax     Congenital absence of pulmonary artery     to [facility]    Dyspnea     Encounter for blood transfusion     Fracture of wrist     x4    General anesthetics causing adverse effect in therapeutic use     was mean as a child when waking up after tonsillectomy    Hypoplastic left heart     Liver disease     enlarged liver    Obstructive sleep apnea     resolved by T&A    Obstructive sleep apnea (adult) (pediatric)     Post-Fontan protein-losing enteropathy     Stroke     mother states possibly had stroke at 4 y/o    TIA (transient ischemic attack) 10/03/2023    Tonsillar and adenoid hypertrophy      Current Outpatient Medications   Medication Instructions    ALPRAZolam (XANAX) 0.5 mg, Oral, Nightly, as needed.    aspirin 81 mg, Oral, Nightly    digoxin (LANOXIN) 0.125 mg, Oral, Nightly    doxycycline (MONODOX) 100 mg, Oral, Every 12 hours    enalapril (VASOTEC) 2.5 MG tablet TAKE 1 TABLET BY MOUTH TWICE A DAY    furosemide (LASIX) 20 mg, Oral, 2 times daily    gentamicin (GARAMYCIN) 0.1 % ointment SMARTSI Topical Daily    lisdexamfetamine (VYVANSE) 30 mg, Oral, Every morning    LOPROX 1 % shampoo SMARTSIG:Sparingly Topical 2-3 Times Weekly PRN    rivaroxaban (XARELTO) 10 mg, Oral, With dinner    sertraline (ZOLOFT) 125 mg, Oral, Daily    sildenafil  (REVATIO) 20 mg, Oral, 2 times daily, (pt taking twice a day)    spironolactone (ALDACTONE) 25 mg, Oral, 2 times daily    tretinoin (RETIN-A) 0.025 % gel Topical (Top), Nightly    VRAYLAR 3 mg, Oral, Daily     Past Surgical History:   Procedure Laterality Date    AORTOGRAM, PEDIATRIC  3/15/2024    Procedure: Aortogram, Pediatric;  Surgeon: Jimi Pollard Jr., MD;  Location: [facility] CATH LAB;  Service: Cardiology;;    BIDIRECTIONAL JOSE W/ ATRIAL SEPTECTOMY      [facility]    CARDIAC SURGERY      CATHETER REFENESTRATION  1/11/2005     [facility]    COARCTATION STENT  3/9/11    COLONOSCOPY N/A 5/27/2021    Procedure: COLONOSCOPY;  Surgeon: Benigno Bowers MD;  Location: [facility] SUSHILA (2ND FLR);  Service: Endoscopy;  Laterality: N/A;    COMBINED RIGHT AND RETROGRADE LEFT HEART CATHETERIZATION FOR CONGENITAL HEART DEFECT N/A 3/7/2019    Procedure: CATHETERIZATION, HEART, COMBINED RIGHT AND RETROGRADE LEFT, FOR CONGENITAL HEART DEFECT;  Surgeon: Jimi Pollard Jr., MD;  Location: [facility] CATH LAB;  Service: Cardiology;  Laterality: N/A;  ped heart    COMBINED RIGHT AND RETROGRADE LEFT HEART CATHETERIZATION FOR CONGENITAL HEART DEFECT N/A 3/15/2024    Procedure: Catheterization, Heart, Combined Right and Retrograde Left, for Congenital Heart Defect;  Surgeon: Jimi Pollard Jr., MD;  Location: [facility] CATH LAB;  Service: Cardiology;  Laterality: N/A;    ESOPHAGOGASTRODUODENOSCOPY N/A 5/27/2021    Procedure: EGD (ESOPHAGOGASTRODUODENOSCOPY);  Surgeon: Benigno Bowers MD;  Location: [facility] ENDO (2ND FLR);  Service: Endoscopy;  Laterality: N/A;  Patient will need pediatric heart anesthesiologist due to history of Fontan     mother states having formed stool         COVID test at [facility] on 5/24-GT    FONTAN PROCEDURE, EXTRACARDIAC  9/27/2004    [facility]    LPA     RE CONSTRUCTION      [facility]    LPA STENT  2/26/.2009    OF    [place]/ mitzi  age 3 days     done at [facility]    TONSILLECTOMY, ADENOIDECTOMY  11/2011    VENOGRAM,  "ANOMALOUS OR PERSISTENT VENA CAVA  3/15/2024    Procedure: VENOGRAM, ANOMALOUS OR PERSISTENT VENA CAVA;  Surgeon: Jimi Pollard Jr., MD;  Location: [facility] CATH LAB;  Service: Cardiology;;    VENOGRAM, VENOVENOUS COLLATERALS ORIGINATING BELOW THE HEART  3/15/2024    Procedure: VENOGRAM, VENOVENOUS COLLATERALS ORIGINATING BELOW THE HEART;  Surgeon: Jimi Pollard Jr., MD;  Location: [facility] CATH LAB;  Service: Cardiology;;    WISDOM TOOTH EXTRACTION       Family History   Problem Relation Name Age of Onset    No Known Problems Father      Urologic Abnormality Other mggm     Thyroid disease Mother      No Known Problems Maternal Grandmother      Hypertension Maternal Grandfather      Skin cancer Maternal Grandfather      Hypertension Paternal Grandmother      Glaucoma Paternal Grandmother      No Known Problems Sister      No Known Problems Brother      No Known Problems Maternal Aunt      No Known Problems Maternal Uncle      No Known Problems Paternal Aunt      No Known Problems Paternal Uncle      No Known Problems Paternal Grandfather      Cancer Other greatgrand father 80        colon    Heart disease Neg Hx      Diabetes Neg Hx      Retinal detachment Neg Hx      Amblyopia Neg Hx      Blindness Neg Hx      Strabismus Neg Hx      Macular degeneration Neg Hx      Stroke Neg Hx      Breast cancer Neg Hx      Ovarian cancer Neg Hx      Esophageal cancer Neg Hx         Social History     Tobacco Use    Smoking status: Every Day     Types: Vaping with nicotine    Smokeless tobacco: Never    Tobacco comments:     No TAMMY   Substance Use Topics    Alcohol use: Yes     Comment: occassional    Drug use: No        Physical Exam      Vital Signs Reviewed   /71   Pulse 82   Temp 98.7 °F (37.1 °C)   Resp 20   Ht 5' 7" (1.702 m)   Wt 95.3 kg (210 lb)   SpO2 (!) 86%   BMI 32.89 kg/m²        Procedures    Procedures     Labs     Results for orders placed or performed in visit on 08/02/24   SARS Coronavirus 2 Antigen, " POCT Manual Read   Result Value Ref Range    SARS Coronavirus 2 Antigen Negative Negative     Acceptable Yes      *Note: Due to a large number of results and/or encounters for the requested time period, some results have not been displayed. A complete set of results can be found in Results Review.       Assessment:     1. Sore throat    2. Acute sinusitis, recurrence not specified, unspecified location        Plan:       Sore throat  -     SARS Coronavirus 2 Antigen, POCT Manual Read    Acute sinusitis, recurrence not specified, unspecified location    Other orders  -     doxycycline (MONODOX) 100 MG capsule; Take 1 capsule (100 mg total) by mouth every 12 (twelve) hours. for 10 days  Dispense: 20 capsule; Refill: 0      Drink plenty of fluids.     Get plenty of rest.     Tylenol as needed.     Go to the ER with any significant change or worsening of symptoms.     Follow up with your primary care doctor.

## 2024-08-02 NOTE — PATIENT INSTRUCTIONS
Drink plenty of fluids.     Get plenty of rest.     Tylenol as needed.     Go to the ER with any significant change or worsening of symptoms.     Follow up with your primary care doctor.       Smoking cessation strongly encouraged.  Assistance offered, information will be provided.  If not ready to quit now, patient will contact this clinic in the future if I can be of any specific help related to the discontinuation of smoking/tobacco use.

## 2024-08-04 ENCOUNTER — OFFICE VISIT (OUTPATIENT)
Dept: URGENT CARE | Facility: CLINIC | Age: 24
End: 2024-08-04
Payer: COMMERCIAL

## 2024-08-04 VITALS
OXYGEN SATURATION: 87 % | DIASTOLIC BLOOD PRESSURE: 80 MMHG | TEMPERATURE: 99 F | RESPIRATION RATE: 20 BRPM | SYSTOLIC BLOOD PRESSURE: 120 MMHG | HEIGHT: 67 IN | HEART RATE: 74 BPM | BODY MASS INDEX: 32.96 KG/M2 | WEIGHT: 210 LBS

## 2024-08-04 DIAGNOSIS — J06.9 ACUTE URI: Primary | ICD-10-CM

## 2024-08-04 DIAGNOSIS — J02.9 SORE THROAT: ICD-10-CM

## 2024-08-04 LAB
CTP QC/QA: YES
SARS-COV-2 RDRP RESP QL NAA+PROBE: NEGATIVE

## 2024-08-04 PROCEDURE — 99213 OFFICE O/P EST LOW 20 MIN: CPT | Mod: 25,,, | Performed by: PHYSICIAN ASSISTANT

## 2024-08-04 PROCEDURE — 96372 THER/PROPH/DIAG INJ SC/IM: CPT | Mod: ,,, | Performed by: PHYSICIAN ASSISTANT

## 2024-08-04 PROCEDURE — 87635 SARS-COV-2 COVID-19 AMP PRB: CPT | Mod: QW,,, | Performed by: PHYSICIAN ASSISTANT

## 2024-08-04 RX ORDER — BETAMETHASONE SODIUM PHOSPHATE AND BETAMETHASONE ACETATE 3; 3 MG/ML; MG/ML
9 INJECTION, SUSPENSION INTRA-ARTICULAR; INTRALESIONAL; INTRAMUSCULAR; SOFT TISSUE
Status: COMPLETED | OUTPATIENT
Start: 2024-08-04 | End: 2024-08-04

## 2024-08-04 RX ADMIN — BETAMETHASONE SODIUM PHOSPHATE AND BETAMETHASONE ACETATE 9 MG: 3; 3 INJECTION, SUSPENSION INTRA-ARTICULAR; INTRALESIONAL; INTRAMUSCULAR; SOFT TISSUE at 09:08

## 2024-08-07 ENCOUNTER — OFFICE VISIT (OUTPATIENT)
Dept: URGENT CARE | Facility: CLINIC | Age: 24
End: 2024-08-07
Payer: COMMERCIAL

## 2024-08-07 VITALS
BODY MASS INDEX: 32.96 KG/M2 | DIASTOLIC BLOOD PRESSURE: 73 MMHG | OXYGEN SATURATION: 89 % | SYSTOLIC BLOOD PRESSURE: 116 MMHG | RESPIRATION RATE: 20 BRPM | TEMPERATURE: 99 F | HEART RATE: 83 BPM | WEIGHT: 210 LBS | HEIGHT: 67 IN

## 2024-08-07 DIAGNOSIS — H10.31 ACUTE CONJUNCTIVITIS OF RIGHT EYE, UNSPECIFIED ACUTE CONJUNCTIVITIS TYPE: ICD-10-CM

## 2024-08-07 DIAGNOSIS — R05.1 ACUTE COUGH: Primary | ICD-10-CM

## 2024-08-07 PROCEDURE — 99213 OFFICE O/P EST LOW 20 MIN: CPT | Mod: ,,, | Performed by: PHYSICIAN ASSISTANT

## 2024-09-06 ENCOUNTER — TELEPHONE (OUTPATIENT)
Dept: OPHTHALMOLOGY | Facility: CLINIC | Age: 24
End: 2024-09-06
Payer: COMMERCIAL

## 2024-09-19 ENCOUNTER — PATIENT MESSAGE (OUTPATIENT)
Dept: PEDIATRIC CARDIOLOGY | Facility: CLINIC | Age: 24
End: 2024-09-19
Payer: COMMERCIAL

## 2024-09-19 DIAGNOSIS — Q25.1 AORTA COARCTATION: ICD-10-CM

## 2024-09-19 DIAGNOSIS — Q27.30 AVM (ARTERIOVENOUS MALFORMATION): ICD-10-CM

## 2024-09-19 DIAGNOSIS — Q25.6 PULMONARY ARTERY STENOSIS OF CENTRAL BRANCH: ICD-10-CM

## 2024-09-19 DIAGNOSIS — Q23.4 HLHS (HYPOPLASTIC LEFT HEART SYNDROME): Primary | ICD-10-CM

## 2024-09-19 DIAGNOSIS — Q25.79: ICD-10-CM

## 2024-10-11 DIAGNOSIS — I27.20 PULMONARY HYPERTENSION: ICD-10-CM

## 2024-10-11 RX ORDER — SILDENAFIL CITRATE 20 MG/1
20 TABLET ORAL 2 TIMES DAILY
Qty: 60 TABLET | Refills: 6 | Status: SHIPPED | OUTPATIENT
Start: 2024-10-11

## 2024-12-12 RX ORDER — DIGOXIN 125 MCG
0.12 TABLET ORAL NIGHTLY
Qty: 90 TABLET | Refills: 3 | Status: SHIPPED | OUTPATIENT
Start: 2024-12-12

## 2024-12-16 ENCOUNTER — OFFICE VISIT (OUTPATIENT)
Dept: PEDIATRIC CARDIOLOGY | Facility: CLINIC | Age: 24
End: 2024-12-16
Attending: PEDIATRICS
Payer: COMMERCIAL

## 2024-12-16 ENCOUNTER — HOSPITAL ENCOUNTER (OUTPATIENT)
Dept: PEDIATRIC CARDIOLOGY | Facility: HOSPITAL | Age: 24
Discharge: HOME | End: 2024-12-16
Attending: PEDIATRICS
Payer: COMMERCIAL

## 2024-12-16 VITALS
WEIGHT: 203.81 LBS | SYSTOLIC BLOOD PRESSURE: 139 MMHG | OXYGEN SATURATION: 90 % | BODY MASS INDEX: 30.89 KG/M2 | HEIGHT: 68 IN | HEART RATE: 92 BPM | DIASTOLIC BLOOD PRESSURE: 69 MMHG

## 2024-12-16 DIAGNOSIS — Q25.1 AORTA COARCTATION: ICD-10-CM

## 2024-12-16 DIAGNOSIS — Q23.4 HLHS (HYPOPLASTIC LEFT HEART SYNDROME): ICD-10-CM

## 2024-12-16 DIAGNOSIS — Q27.30 AVM (ARTERIOVENOUS MALFORMATION): ICD-10-CM

## 2024-12-16 DIAGNOSIS — Q25.79: ICD-10-CM

## 2024-12-16 DIAGNOSIS — Q25.6 PULMONARY ARTERY STENOSIS OF CENTRAL BRANCH: ICD-10-CM

## 2024-12-16 DIAGNOSIS — Z98.890 PERSONAL HISTORY OF SURGERY TO HEART AND GREAT VESSELS, PRESENTING HAZARDS TO HEALTH: ICD-10-CM

## 2024-12-16 DIAGNOSIS — Z98.890 POST-FONTAN PROTEIN-LOSING ENTEROPATHY: ICD-10-CM

## 2024-12-16 DIAGNOSIS — Z97.5 INTRAUTERINE DEVICE: ICD-10-CM

## 2024-12-16 DIAGNOSIS — K90.49 POST-FONTAN PROTEIN-LOSING ENTEROPATHY: ICD-10-CM

## 2024-12-16 DIAGNOSIS — R23.0 CYANOSIS: ICD-10-CM

## 2024-12-16 DIAGNOSIS — F41.9 ANXIETY: Primary | ICD-10-CM

## 2024-12-16 DIAGNOSIS — Z98.890 S/P FONTAN PROCEDURE: ICD-10-CM

## 2024-12-16 DIAGNOSIS — K76.1 CHRONIC PASSIVE CONGESTION OF LIVER: ICD-10-CM

## 2024-12-16 DIAGNOSIS — R00.2 PALPITATIONS: ICD-10-CM

## 2024-12-16 PROCEDURE — 93325 DOPPLER ECHO COLOR FLOW MAPG: CPT | Mod: 26,,, | Performed by: PEDIATRICS

## 2024-12-16 PROCEDURE — 93320 DOPPLER ECHO COMPLETE: CPT | Mod: 26,,, | Performed by: PEDIATRICS

## 2024-12-16 PROCEDURE — 99215 OFFICE O/P EST HI 40 MIN: CPT | Mod: 25,S$GLB,, | Performed by: PEDIATRICS

## 2024-12-16 PROCEDURE — 99999 PR PBB SHADOW E&M-EST. PATIENT-LVL V: CPT | Mod: PBBFAC,,, | Performed by: PEDIATRICS

## 2024-12-16 PROCEDURE — 99999 PR PBB SHADOW E&M-EST. PATIENT-LVL I: CPT | Mod: PBBFAC,,,

## 2024-12-16 PROCEDURE — 93246 EXT ECG>7D<15D RECORDING: CPT

## 2024-12-16 PROCEDURE — 93303 ECHO TRANSTHORACIC: CPT | Mod: 26,,, | Performed by: PEDIATRICS

## 2024-12-16 PROCEDURE — 93325 DOPPLER ECHO COLOR FLOW MAPG: CPT

## 2024-12-16 PROCEDURE — 93000 ELECTROCARDIOGRAM COMPLETE: CPT | Mod: S$GLB,,, | Performed by: STUDENT IN AN ORGANIZED HEALTH CARE EDUCATION/TRAINING PROGRAM

## 2024-12-16 NOTE — PROGRESS NOTES
Thank you for referring your patient Jil Lennon to the cardiology clinic for consultation. The patient is accompanied by her mother. Please review my findings below.    CHIEF COMPLAINT: Hypoplastic left heart syndrome s/p Fontan    HISTORY OF PRESENT ILLNESS:  I had the pleasure of seeing Jil today in follow-up in the pediatric cardiology clinic at the [facility].  Jil is a 24 y.o. female who presents for follow-up of palliated HLHS. Her staged surgeries were done in [place]. Catheter re-fenestration and LPA/coarctation stents were done at [facility].     Last cath 3/15/2024 showed:  Hypoplastic left heart status post conduit re-fenestration, LPA/conduit/re-coarctation stents.  2.   Good Fontan hemodynamics with moderate atrial level right-to-left fenestration shunt (Qp/Qs 0.8). PAp 13 mmHg, TPG 2 mmHg and CO 5 lpm.  3.  Well-expanded stents with no residual conduit/LPA or coarctation gradients.  4.  No significant venovenous or systemic to pulmonary collaterals identified.  5.  Trace lucero aortic valve insufficiency.                       INTERIM HISTORY: Since her last clinic visit, Jil has done relatively well. She did have a TIA several months ago before her last visit.  She is mainly concerned about some intermittent palpitations which are painful in nature.  She reports feeling like she has been kicked in the chest.  The palpitations also take her breath away.  She denies prolonged palpitations and syncope.  Her last question related to upcoming travel to [place].  She was wondering if she should take oxygen.  Besides the above concerns, she reports feeling well.  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  She has no other complaints referable to the cardiovascular system     REVIEW OF SYSTEMS:      GENERAL: No fever, chills, fatigability or weight loss.  SKIN: No rashes, itching or changes in color or texture of skin.  EYES: Visual acuity fine. No  photophobia, ocular pain or diplopia.  EARS: Denies ear pain, discharge or vertigo.  MOUTH & THROAT: No hoarseness or change in voice. No excessive gum bleeding.  CHEST: Denies ANDERSON, wheezing, cough and sputum production.  CARDIOVASCULAR: Denies chest pain, PND, orthopnea or reduced exercise tolerance.  ABDOMEN: Appetite fine. No weight loss. Denies diarrhea, abdominal pain, hematemesis or blood in stool.  PERIPHERAL VASCULAR: No claudication or cyanosis.  MUSCULOSKELETAL: Denies back pain.  NEUROLOGIC: No history of seizures, paralysis, alteration of gait or coordination    PAST MEDICAL HISTORY:   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    Chylothorax     Congenital absence of pulmonary artery     to [place]    Dyspnea     Encounter for blood transfusion     Fracture of wrist     x4    General anesthetics causing adverse effect in therapeutic use     was mean as a child when waking up after tonsillectomy    Hypoplastic left heart     Liver disease     enlarged liver    Obstructive sleep apnea     resolved by T&A    Obstructive sleep apnea (adult) (pediatric)     Post-Fontan protein-losing enteropathy     Stroke     mother states possibly had stroke at 4 y/o    TIA (transient ischemic attack) 10/03/2023    Tonsillar and adenoid hypertrophy            FAMILY HISTORY:   Family History   Problem Relation Name Age of Onset    No Known Problems Father      Urologic Abnormality Other mggm     Thyroid disease Mother      No Known Problems Maternal Grandmother      Hypertension Maternal Grandfather      Skin cancer Maternal Grandfather      Hypertension Paternal Grandmother      Glaucoma Paternal Grandmother      No Known Problems Sister      No Known Problems Brother      No Known Problems Maternal Aunt      No Known Problems Maternal Uncle      No Known Problems Paternal Aunt      No Known Problems Paternal Uncle      No Known Problems Paternal  Grandfather      Cancer Other greatgrand father 80        colon    Heart disease Neg Hx      Diabetes Neg Hx      Retinal detachment Neg Hx      Amblyopia Neg Hx      Blindness Neg Hx      Strabismus Neg Hx      Macular degeneration Neg Hx      Stroke Neg Hx      Breast cancer Neg Hx      Ovarian cancer Neg Hx      Esophageal cancer Neg Hx           SOCIAL HISTORY:   Social History     Socioeconomic History    Marital status: Single   Tobacco Use    Smoking status: Every Day     Types: Vaping with nicotine    Smokeless tobacco: Never    Tobacco comments:     No TAMMY   Substance and Sexual Activity    Alcohol use: Yes     Comment: occassional    Drug use: No    Sexual activity: Yes     Partners: Male, Female     Birth control/protection: I.U.D.   Social History Narrative    Parents . Lives with mother and step father, 2 cats, 2 sugar gliders,    Attending EMT training     Social Drivers of Health     Financial Resource Strain: Low Risk  (10/4/2023)    Overall Financial Resource Strain (CARDIA)     Difficulty of Paying Living Expenses: Not hard at all   Food Insecurity: No Food Insecurity (10/4/2023)    Hunger Vital Sign     Worried About Running Out of Food in the Last Year: Never true     Ran Out of Food in the Last Year: Never true   Transportation Needs: No Transportation Needs (10/4/2023)    PRAPARE - Transportation     Lack of Transportation (Medical): No     Lack of Transportation (Non-Medical): No   Physical Activity: Inactive (10/4/2023)    Exercise Vital Sign     Days of Exercise per Week: 0 days     Minutes of Exercise per Session: 0 min   Stress: No Stress Concern Present (10/4/2023)    [employer] - Occupational Stress Questionnaire     Feeling of Stress : Not at all   Housing Stability: Low Risk  (10/4/2023)    Housing Stability Vital Sign     Unable to Pay for Housing in the Last Year: No     Number of Places Lived in the Last Year: 1     Unstable Housing in the Last  Year: No       ALLERGIES:  Review of patient's allergies indicates:   Allergen Reactions    Adhesive Dermatitis       MEDICATIONS:    Current Outpatient Medications:     aspirin 81 MG Chew, Take 81 mg by mouth every evening., Disp: , Rfl:     cariprazine (VRAYLAR) 3 mg Cap, Take 3 mg by mouth Daily., Disp: , Rfl:     digoxin (LANOXIN) 125 mcg tablet, Take 1 tablet (0.125 mg total) by mouth every evening., Disp: 90 tablet, Rfl: 3    enalapril (VASOTEC) 2.5 MG tablet, TAKE 1 TABLET BY MOUTH TWICE A DAY, Disp: 60 tablet, Rfl: 9    furosemide (LASIX) 20 MG tablet, Take 1 tablet (20 mg total) by mouth 2 (two) times daily., Disp: 60 tablet, Rfl: 10    lisdexamfetamine (VYVANSE) 30 MG capsule, Take 30 mg by mouth every morning., Disp: , Rfl:     LOPROX 1 % shampoo, SMARTSIG:Sparingly Topical 2-3 Times Weekly PRN, Disp: , Rfl:     rivaroxaban (XARELTO) 10 mg Tab, Take 1 tablet (10 mg total) by mouth daily with dinner or evening meal., Disp: 90 tablet, Rfl: 3    sertraline (ZOLOFT) 50 MG tablet, Take 125 mg by mouth once daily., Disp: , Rfl:     sildenafil (REVATIO) 20 mg Tab, TAKE 1 TABLET (20 MG TOTAL) BY MOUTH 2 (TWO) TIMES DAILY. (PT TAKING TWICE A DAY), Disp: 60 tablet, Rfl: 6    spironolactone (ALDACTONE) 25 MG tablet, Take 1 tablet (25 mg total) by mouth 2 (two) times daily., Disp: 60 tablet, Rfl: 9    ALPRAZolam (XANAX) 0.5 MG tablet, Take 0.5 mg by mouth every evening. as needed. (Patient not taking: Reported on 2024), Disp: , Rfl:     gentamicin (GARAMYCIN) 0.1 % ointment, SMARTSI Topical Daily (Patient not taking: Reported on 2024), Disp: , Rfl:     tretinoin (RETIN-A) 0.025 % gel, Apply topically every evening. (Patient not taking: Reported on 2024), Disp: , Rfl:       PHYSICAL EXAM:   Vitals:    24 0950 24 0951 24 0952 24 0953   BP: 134/64 126/71 133/63 139/69   BP Location: Right arm Left arm Right leg Left leg   Patient Position: Sitting Sitting Lying Lying   Pulse:  "92      SpO2: (!) 90%      Weight: 92.4 kg (203 lb 13 oz)      Height: 5' 8.19" (1.732 m)            GENERAL: Awake, well-developed well-nourished, no apparent distress. Mild cyanosis.  HEENT: Mucous membranes moist and slightly cyanotic, normocephalic atraumatic, no cranial or carotid bruits, sclera anicteric, EOMI  NECK: No jugular venous distention, no thyromegaly, no lymphadenopathy  CHEST: Good air movement, clear to auscultation bilaterally. No increase work of breathing.  CARDIOVASCULAR: Quiet precordium, regular rate and rhythm, S1 normal. Murmur heard.      High-pitched blowing holosystolic murmur is present with a grade of 1/6 at the lower left sternal border. Single S2      No friction rub. No gallop.  ABDOMEN: Soft, nontender nondistended, no hepatosplenomegaly, no aortic bruits  EXTREMITIES: Warm well perfused, 2+ radial/femoral/pedal pulses, capillary refill 2 seconds, no clubbing, cyanosis, or edema  NEURO: Alert and oriented, cooperative with exam, face symmetric, moves all extremities well    STUDIES:  EKG: Normal sinus rhythm. Right axis deviation  ECHOCARDIOGRAM:  Hypoplastic left heart syndrome s/p extracardiac Fontan, left pulmonary artery stent, coarctation stent.  The study was technically difficult with many images being suboptimal in quality.  There is low velocity, phasic flow in the right superior vena cava, at Abel anastomosis, stented pulmonary confluence, proximal  RPA, inferior vena cava to Fontan, some segments of Fontan conduit and Fontan anastomosis.  The Fontan fenestration was not well demonstrated.  Limited images demonstrate unrestricted pulmonary venous return to the left atrium with individual pulmonary veins not well  demonstrated.  Unrestricted atrial septal communication.  Mild tricuspid valve insufficiency.  Qualitatively the right ventricle is moderately dilated and mildly hypertrophied with qualitatively normal single ventricle systolic  function.  Mild-to-moderate (see " pressure half-time) lucero aortic valve insufficiency.  Reconstructed aortic arch normal and mildly dilated in size with stent present across presumptive coarctation, peak velocity  <2.6 m/sec across this area and minimal diastolic runoff.  Color doppler demonstrates normal flow in DKS anastomosis.  No pericardial effusion    ASSESSMENT:  Encounter Diagnoses   Name Primary?    Anxiety Yes    Cyanosis     Personal history of surgery to heart and great vessels, presenting hazards to health     Aorta coarctation     HLHS (hypoplastic left heart syndrome)     Pulmonary artery stenosis of central branch     AVM (arteriovenous malformation)     Palpitations     S/P Fontan procedure     Congenital absence of pulmonary artery     Post-Fontan protein-losing enteropathy     Intrauterine device     Chronic passive congestion of liver (FALD)      PLAN:     1) I reviewed my physical exam findings and the echocardiographic findings with Jil and her mother.  Her physical exam and echocardiogram are overall unchanged.  She is having more palpitations and they appeared to be quite disruptive.  I believe she needs a prolonged Holter monitor to evaluate for premature atrial or ventricular beats.  These might require medical treatment.  Given this, I will plan to place a Holter monitor for 14 days.  I am hopeful that this will demonstrates the cause of her symptoms and thus proper treatment can be administered.  I explained this to her and she verbalized understanding.    2) 14 day holter    3) Continue all current medications.    4) She is currently a fenestrated Fontan.  I do believe she will do better in [place] at altitude with oxygen.  I recommended that she takes oxygen with her to [place] given her underlying cardiac disease.  She also took oxygen with her to [facility] when they went on vacation.    5) SBE prophylaxis for cause.    6) Coordinate her next cardiology clinic visit with a follow-up in the Fontan associated liver  disease clinic.  She can get blood work and labs at that time.    7) I informed Jil to call with further questions or concerns.    8) Follow-up in 6 months with Dr. Plolard.  Echocardiogram and EKG    Time Spent: 45 (min) with over 50% in direct patient and family consultation.      The patient's doctor will be notified via Fax    I hope this brings you up-to-date on Jil Lennon  Please contact me with any questions or concerns.    Elenita Da Silva MD  Pediatric Cardiology  Interventional Cardiology  [facility] [place] [address]  [phone]

## 2024-12-17 ENCOUNTER — PATIENT MESSAGE (OUTPATIENT)
Dept: PEDIATRIC CARDIOLOGY | Facility: CLINIC | Age: 24
End: 2024-12-17
Payer: COMMERCIAL

## 2024-12-18 ENCOUNTER — TELEPHONE (OUTPATIENT)
Dept: PEDIATRIC CARDIOLOGY | Facility: CLINIC | Age: 24
End: 2024-12-18
Payer: COMMERCIAL

## 2024-12-18 DIAGNOSIS — R23.0 CYANOSIS: Primary | ICD-10-CM

## 2024-12-18 NOTE — TELEPHONE ENCOUNTER
Returned call and got medical supply company name, [employer], and fax number. Portable oxygen prescription faxed and rcv'd. PRN oxygen for trip to [place].     ----- Message from Charley sent at 12/18/2024  2:21 PM CST -----  Name of Who is Calling:   MIRANDA ENCARNACION [[id number]] Mom     What is the request in detail:Mom is calling to get an prescription for an probate oxygen and she was seen Monday 12/16 and she need it it today  please advise thank you         Can the clinic reply by [facility]:call back         [facility] Telephone Information:

## 2024-12-25 ENCOUNTER — PATIENT MESSAGE (OUTPATIENT)
Dept: PEDIATRIC CARDIOLOGY | Facility: CLINIC | Age: 24
End: 2024-12-25
Payer: COMMERCIAL

## 2025-02-25 ENCOUNTER — OFFICE VISIT (OUTPATIENT)
Dept: URGENT CARE | Facility: CLINIC | Age: 25
End: 2025-02-25
Payer: COMMERCIAL

## 2025-02-25 VITALS
BODY MASS INDEX: 32.96 KG/M2 | DIASTOLIC BLOOD PRESSURE: 86 MMHG | HEIGHT: 67 IN | OXYGEN SATURATION: 91 % | TEMPERATURE: 98 F | HEART RATE: 110 BPM | WEIGHT: 210 LBS | RESPIRATION RATE: 18 BRPM | SYSTOLIC BLOOD PRESSURE: 120 MMHG

## 2025-02-25 DIAGNOSIS — R07.89 CHEST TIGHTNESS: Primary | ICD-10-CM

## 2025-02-25 DIAGNOSIS — R00.2 PALPITATIONS: ICD-10-CM

## 2025-02-25 PROCEDURE — 99214 OFFICE O/P EST MOD 30 MIN: CPT | Mod: ,,, | Performed by: PHYSICIAN ASSISTANT

## 2025-02-25 NOTE — PROGRESS NOTES
"Subjective:      Patient ID: Jil Lennon is a 24 y.o. female.    Vitals:  height is 5' 7" (1.702 m) and weight is 95.3 kg (210 lb). Her temperature is 97.8 °F (36.6 °C). Her blood pressure is 120/86 and her pulse is 110. Her respiration is 18 and oxygen saturation is 91% (abnormal).     Chief Complaint: Palpitations     Patient is a 24 y.o. female with a pmhx of HLHS. Her staged surgeries were done in [place]. Catheter re-fenestration and LPA/coarctation stents were done at [facility]. She presents to urgent care with complaints of heart palpitations and chest tightness since this morning, pt states her O2 stat is usually in the 80s with heart condition, congestion,sweating , fatigue, nausea, headache for 1 week.       Palpitations       Cardiovascular:  Positive for palpitations.      Objective:     Physical Exam   Constitutional: She is oriented to person, place, and time. She appears well-developed. She is cooperative.  Non-toxic appearance. She does not appear ill. No distress.   HENT:   Head: Normocephalic and atraumatic.   Ears:   Right Ear: Hearing normal.   Left Ear: Hearing normal.   Eyes: Conjunctivae and lids are normal. No scleral icterus.   Neck: Trachea normal and phonation normal. Neck supple. No edema present. No erythema present. No neck rigidity present.   Cardiovascular: Regular rhythm. Tachycardia present.   Pulmonary/Chest: Effort normal and breath sounds normal. No respiratory distress. She has no decreased breath sounds. She has no rhonchi.   Abdominal: Normal appearance.   Musculoskeletal: Normal range of motion.         General: No deformity. Normal range of motion.   Neurological: She is alert and oriented to person, place, and time. She exhibits normal muscle tone. Coordination normal.   Skin: Skin is warm, dry, intact, not diaphoretic and not pale.   Psychiatric: Her speech is normal and behavior is normal. Judgment and thought content normal.   Nursing note and vitals " reviewed.    EKG:  Sinus rhythm rate of 100bpm. Hypertrophy present diffusely. St segment depression noted in the anterior leads and lead II.          Previous History      Review of patient's allergies indicates:   Allergen Reactions    Adhesive Dermatitis       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    Chylothorax     Congenital absence of pulmonary artery     to [place]    Dyspnea     Encounter for blood transfusion     Fracture of wrist     x4    General anesthetics causing adverse effect in therapeutic use     was mean as a child when waking up after tonsillectomy    Hypoplastic left heart     Liver disease     enlarged liver    Obstructive sleep apnea     resolved by T&A    Obstructive sleep apnea (adult) (pediatric)     Post-Fontan protein-losing enteropathy     Stroke     mother states possibly had stroke at 4 y/o    TIA (transient ischemic attack) 10/03/2023    Tonsillar and adenoid hypertrophy      Current Outpatient Medications   Medication Instructions    ALPRAZolam (XANAX) 0.5 mg, Nightly    aspirin 81 mg, Nightly    digoxin (LANOXIN) 0.125 mg, Oral, Nightly    enalapril (VASOTEC) 2.5 MG tablet TAKE 1 TABLET BY MOUTH TWICE A DAY    furosemide (LASIX) 20 mg, Oral, 2 times daily    gentamicin (GARAMYCIN) 0.1 % ointment SMARTSI Topical Daily    lisdexamfetamine (VYVANSE) 30 mg, Every morning    LOPROX 1 % shampoo SMARTSIG:Sparingly Topical 2-3 Times Weekly PRN    rivaroxaban (XARELTO) 10 mg, Oral, With dinner    sertraline (ZOLOFT) 125 mg, Daily    sildenafil (REVATIO) 20 mg, Oral, 2 times daily, (pt taking twice a day)    spironolactone (ALDACTONE) 25 mg, Oral, 2 times daily    tretinoin (RETIN-A) 0.025 % gel Nightly    VRAYLAR 3 mg, Daily     Past Surgical History:   Procedure Laterality Date    AORTOGRAM, PEDIATRIC  3/15/2024    Procedure: Aortogram, Pediatric;  Surgeon: Jimi Pollard Jr., MD;  Location: Counts include 234 beds at the [facility]  LAB;  Service: Cardiology;;    BIDIRECTIONAL JOSE W/ ATRIAL SEPTECTOMY      [facility]    CARDIAC SURGERY      CATHETER REFENESTRATION  1/11/2005     OF    COARCTATION STENT  3/9/11    COLONOSCOPY N/A 5/27/2021    Procedure: COLONOSCOPY;  Surgeon: Benigno Bowers MD;  Location: [facility] ENDO (2ND FLR);  Service: Endoscopy;  Laterality: N/A;    COMBINED RIGHT AND RETROGRADE LEFT HEART CATHETERIZATION FOR CONGENITAL HEART DEFECT N/A 3/7/2019    Procedure: CATHETERIZATION, HEART, COMBINED RIGHT AND RETROGRADE LEFT, FOR CONGENITAL HEART DEFECT;  Surgeon: Jimi Pollard Jr., MD;  Location: [facility] CATH LAB;  Service: Cardiology;  Laterality: N/A;  ped heart    COMBINED RIGHT AND RETROGRADE LEFT HEART CATHETERIZATION FOR CONGENITAL HEART DEFECT N/A 3/15/2024    Procedure: Catheterization, Heart, Combined Right and Retrograde Left, for Congenital Heart Defect;  Surgeon: Jimi Pollard Jr., MD;  Location: [facility] CATH LAB;  Service: Cardiology;  Laterality: N/A;    ESOPHAGOGASTRODUODENOSCOPY N/A 5/27/2021    Procedure: EGD (ESOPHAGOGASTRODUODENOSCOPY);  Surgeon: Benigno Bowers MD;  Location: [facility] ENDO (2ND FLR);  Service: Endoscopy;  Laterality: N/A;  Patient will need pediatric heart anesthesiologist due to history of Fontan     mother states having formed stool         COVID test at [facility] on 5/24-GT    FONTAN PROCEDURE, EXTRACARDIAC  9/27/2004    [facility]'S    LPA     RE CONSTRUCTION      [facility]    LPA STENT  2/26/.2009    OF    narwood/ mitzi  age 3 days     done at [facility]    TONSILLECTOMY, ADENOIDECTOMY  11/2011    VENOGRAM, ANOMALOUS OR PERSISTENT VENA CAVA  3/15/2024    Procedure: VENOGRAM, ANOMALOUS OR PERSISTENT VENA CAVA;  Surgeon: Jimi Pollard Jr., MD;  Location: [facility] CATH LAB;  Service: Cardiology;;    VENOGRAM, VENOVENOUS COLLATERALS ORIGINATING BELOW THE HEART  3/15/2024    Procedure: VENOGRAM, VENOVENOUS COLLATERALS ORIGINATING BELOW THE HEART;  Surgeon: Jimi Pollard Jr., MD;  Location: [facility]  "CATH LAB;  Service: Cardiology;;    WISDOM TOOTH EXTRACTION       Family History   Problem Relation Name Age of Onset    No Known Problems Father      Urologic Abnormality Other mggm     Thyroid disease Mother      No Known Problems Maternal Grandmother      Hypertension Maternal Grandfather      Skin cancer Maternal Grandfather      Hypertension Paternal Grandmother      Glaucoma Paternal Grandmother      No Known Problems Sister      No Known Problems Brother      No Known Problems Maternal Aunt      No Known Problems Maternal Uncle      No Known Problems Paternal Aunt      No Known Problems Paternal Uncle      No Known Problems Paternal Grandfather      Cancer Other greatgrand father 80        colon    Heart disease Neg Hx      Diabetes Neg Hx      Retinal detachment Neg Hx      Amblyopia Neg Hx      Blindness Neg Hx      Strabismus Neg Hx      Macular degeneration Neg Hx      Stroke Neg Hx      Breast cancer Neg Hx      Ovarian cancer Neg Hx      Esophageal cancer Neg Hx         Social History[1]     Physical Exam      Vital Signs Reviewed   /86   Pulse 110   Temp 97.8 °F (36.6 °C)   Resp 18   Ht 5' 7" (1.702 m)   Wt 95.3 kg (210 lb)   SpO2 (!) 91%   BMI 32.89 kg/m²        Procedures    Procedures     Labs     Results for orders placed or performed during the hospital encounter of 12/16/24   3-14 Day Pediatric Holter Monitor    Collection Time: 12/18/24  8:33 AM   Result Value Ref Range    Holter Hookup Date [id number]     Holter Hookup Time 105914     Holter Study End Date 12192024     Holter Study End Time 163856     Holter Scan Date 12312024     Sinus min HR 54 bpm    Sinus max hr 122 bpm    Sinus avg hr 75 bpm    Event Monitor Day 3     Holter length hours 4     holter length minutes 0     holter length dec hours 76.00      *Note: Due to a large number of results and/or encounters for the requested time period, some results have not been displayed. A complete set of results can be found in Results " Review.     Assessment:     1. Chest tightness    2. Palpitations          Plan:         As discussed, it is recommended that you present to the ER now for further evaluation to prevent a delay in care.   Offered transport via EMS but patient/family declines despite informed risks including death.  Opts for private transport via personal vehicle.       Pt to be driven to the ER by her mother.                    [1]   Social History  Tobacco Use    Smoking status: Every Day     Types: Vaping with nicotine     Start date: 2020     Passive exposure: Current    Smokeless tobacco: Never    Tobacco comments:     No TAMMY   Substance Use Topics    Alcohol use: Yes     Comment: 6 times a year 3-4 drinks    Drug use: No

## 2025-03-04 ENCOUNTER — OFFICE VISIT (OUTPATIENT)
Dept: URGENT CARE | Facility: CLINIC | Age: 25
End: 2025-03-04
Payer: COMMERCIAL

## 2025-03-04 VITALS
HEIGHT: 67 IN | WEIGHT: 210 LBS | OXYGEN SATURATION: 88 % | TEMPERATURE: 99 F | HEART RATE: 81 BPM | SYSTOLIC BLOOD PRESSURE: 133 MMHG | RESPIRATION RATE: 18 BRPM | DIASTOLIC BLOOD PRESSURE: 86 MMHG | BODY MASS INDEX: 32.96 KG/M2

## 2025-03-04 DIAGNOSIS — R05.1 ACUTE COUGH: Primary | ICD-10-CM

## 2025-03-04 DIAGNOSIS — Z20.822 CLOSE EXPOSURE TO COVID-19 VIRUS: ICD-10-CM

## 2025-03-04 DIAGNOSIS — R53.83 FATIGUE, UNSPECIFIED TYPE: ICD-10-CM

## 2025-03-04 RX ORDER — AZITHROMYCIN 250 MG/1
TABLET, FILM COATED ORAL
Qty: 6 TABLET | Refills: 0 | Status: SHIPPED | OUTPATIENT
Start: 2025-03-04 | End: 2025-03-09

## 2025-03-04 RX ORDER — PREDNISONE 10 MG/1
10 TABLET ORAL DAILY
Qty: 5 TABLET | Refills: 0 | Status: SHIPPED | OUTPATIENT
Start: 2025-03-04 | End: 2025-03-09

## 2025-03-04 RX ORDER — PROMETHAZINE HYDROCHLORIDE AND DEXTROMETHORPHAN HYDROBROMIDE 6.25; 15 MG/5ML; MG/5ML
5 SYRUP ORAL EVERY 8 HOURS PRN
Qty: 118 ML | Refills: 0 | Status: SHIPPED | OUTPATIENT
Start: 2025-03-04 | End: 2025-03-09

## 2025-03-04 NOTE — PROGRESS NOTES
"Subjective:      Patient ID: Jil Lennon is a 24 y.o. female.    Vitals:  height is 5' 7" (1.702 m) and weight is 95.3 kg (210 lb). Her temperature is 98.8 °F (37.1 °C). Her blood pressure is 133/86 and her pulse is 81. Her respiration is 18 and oxygen saturation is 88% (abnormal).     Chief Complaint: Fatigue    Female reports in the last 3 days having cough congestion voice change headache presents to urgent Care for initial evaluation.  Patient reports close COVID sick contact in [facility] last weekend returning home yesterday presents to urgent care today for initial evaluation.  Patient reports long history of cardiac coacrotation surgeries with chronic hypoxia SpO2 normal for patient 85% room air.  Patient denies shortness for breath chest tightness.      Constitution: Positive for chills and fatigue. Negative for fever.   HENT:  Positive for congestion, sinus pressure and voice change. Negative for ear pain, sinus pain, sore throat and trouble swallowing.    Neck: Negative for neck pain and neck swelling.   Cardiovascular:  Negative for chest pain and sob on exertion.   Respiratory:  Positive for cough. Negative for shortness of breath, stridor and wheezing.    Gastrointestinal: Negative.    Musculoskeletal:  Positive for muscle ache.   Skin: Negative.    Allergic/Immunologic: Negative.    Neurological:  Negative for headaches.      Objective:     Physical Exam   Constitutional: She is oriented to person, place, and time. She appears well-developed. She is cooperative.  Non-toxic appearance. She does not appear ill.      Comments:Awake alert pleasant ambulatory nasally congested female speaks in complete sentences     HENT:   Head: Normocephalic.   Ears:   Right Ear: Hearing, tympanic membrane, external ear and ear canal normal. Tympanic membrane is not erythematous and not bulging.   Left Ear: Hearing, tympanic membrane, external ear and ear canal normal. Tympanic membrane is not " AMBULATORY CASE MANAGEMENT NOTE    Name and Relationship of Patient/Support Person: Preston Wallis - Self    Gómez dropped off his medications today.  Filled 3 weeks of medications and discussed return date for refill of medication.  Mailed upcoming appointment information    Education Documentation  No documentation found.        ALISSON GOMEZ  Ambulatory Case Management    12/8/2022, 11:53 EST   erythematous and not bulging.   Nose: Mucosal edema and congestion present. No rhinorrhea, purulent discharge or nasal deformity. No epistaxis. Right sinus exhibits no maxillary sinus tenderness and no frontal sinus tenderness. Left sinus exhibits no maxillary sinus tenderness and no frontal sinus tenderness.   Mouth/Throat: Uvula is midline, oropharynx is clear and moist and mucous membranes are normal. Mucous membranes are moist. No trismus in the jaw. Normal dentition. No uvula swelling. No oropharyngeal exudate, posterior oropharyngeal edema or posterior oropharyngeal erythema.   Eyes: Conjunctivae and lids are normal. No scleral icterus.   Neck: Trachea normal and phonation normal. Neck supple. No edema present. No erythema present. No neck rigidity present.   Cardiovascular: Normal rate, regular rhythm, normal heart sounds and normal pulses.   Pulmonary/Chest: Effort normal and breath sounds normal. No stridor. No respiratory distress. She has no decreased breath sounds. She has no wheezes. She has no rhonchi. She has no rales.         Comments: Clear to auscultation bilaterally    Musculoskeletal: Normal range of motion.         General: No swelling. Normal range of motion.      Cervical back: She exhibits no tenderness.   Lymphadenopathy:     She has no cervical adenopathy.   Neurological: She is alert and oriented to person, place, and time. She exhibits normal muscle tone. Coordination normal.   Skin: Skin is warm, dry, intact, not diaphoretic and not pale.   Psychiatric: Her speech is normal and behavior is normal. Judgment and thought content normal.   Nursing note and vitals reviewed.       Previous History      Review of patient's allergies indicates:   Allergen Reactions    Adhesive Dermatitis       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    Chylothorax     Congenital absence of pulmonary artery     to [place]    Dyspnea      Encounter for blood transfusion     Fracture of wrist     x4    General anesthetics causing adverse effect in therapeutic use     was mean as a child when waking up after tonsillectomy    Hypoplastic left heart     Liver disease     enlarged liver    Obstructive sleep apnea     resolved by T&A    Obstructive sleep apnea (adult) (pediatric)     Post-Fontan protein-losing enteropathy     Stroke     mother states possibly had stroke at 3 y/o    TIA (transient ischemic attack) 10/03/2023    Tonsillar and adenoid hypertrophy      Current Outpatient Medications   Medication Instructions    ALPRAZolam (XANAX) 0.5 mg, Nightly    aspirin 81 mg, Nightly    azithromycin (Z-MARTHA) 250 MG tablet Take 2 tablets by mouth on day 1; Take 1 tablet by mouth on days 2-5      digoxin (LANOXIN) 0.125 mg, Oral, Nightly    enalapril (VASOTEC) 2.5 MG tablet TAKE 1 TABLET BY MOUTH TWICE A DAY    furosemide (LASIX) 20 mg, Oral, 2 times daily    gentamicin (GARAMYCIN) 0.1 % ointment SMARTSI Topical Daily    lisdexamfetamine (VYVANSE) 30 mg, Every morning    LOPROX 1 % shampoo SMARTSIG:Sparingly Topical 2-3 Times Weekly PRN    predniSONE (DELTASONE) 10 mg, Oral, Daily    promethazine-dextromethorphan (PROMETHAZINE-DM) 6.25-15 mg/5 mL Syrp 5 mLs, Oral, Every 8 hours PRN    rivaroxaban (XARELTO) 10 mg, Oral, With dinner    sertraline (ZOLOFT) 125 mg, Daily    sildenafil (REVATIO) 20 mg, Oral, 2 times daily, (pt taking twice a day)    spironolactone (ALDACTONE) 25 mg, Oral, 2 times daily    tretinoin (RETIN-A) 0.025 % gel Nightly    VRAYLAR 3 mg, Daily     Past Surgical History:   Procedure Laterality Date    AORTOGRAM, PEDIATRIC  3/15/2024    Procedure: Aortogram, Pediatric;  Surgeon: Jimi Pollard Jr., MD;  Location: [facility] CATH LAB;  Service: Cardiology;;    BIDIRECTIONAL JOSE W/ ATRIAL SEPTECTOMY      [facility]    CARDIAC SURGERY      CATHETER REFENESTRATION  2005     [facility]    COARCTATION STENT  3/9/11    COLONOSCOPY N/A 2021     Procedure: COLONOSCOPY;  Surgeon: Benigno Bowers MD;  Location: [facility] ENDO (2ND FLR);  Service: Endoscopy;  Laterality: N/A;    COMBINED RIGHT AND RETROGRADE LEFT HEART CATHETERIZATION FOR CONGENITAL HEART DEFECT N/A 3/7/2019    Procedure: CATHETERIZATION, HEART, COMBINED RIGHT AND RETROGRADE LEFT, FOR CONGENITAL HEART DEFECT;  Surgeon: Jimi Pollard Jr., MD;  Location: [facility] CATH LAB;  Service: Cardiology;  Laterality: N/A;  ped heart    COMBINED RIGHT AND RETROGRADE LEFT HEART CATHETERIZATION FOR CONGENITAL HEART DEFECT N/A 3/15/2024    Procedure: Catheterization, Heart, Combined Right and Retrograde Left, for Congenital Heart Defect;  Surgeon: Jimi Pollard Jr., MD;  Location: [facility] CATH LAB;  Service: Cardiology;  Laterality: N/A;    ESOPHAGOGASTRODUODENOSCOPY N/A 5/27/2021    Procedure: EGD (ESOPHAGOGASTRODUODENOSCOPY);  Surgeon: Benigno Bowers MD;  Location: [facility] ENDO (2ND FLR);  Service: Endoscopy;  Laterality: N/A;  Patient will need pediatric heart anesthesiologist due to history of Fontan     mother states having formed stool         COVID test at [facility] on 5/24-GT    FONTAN PROCEDURE, EXTRACARDIAC  9/27/2004    [facility]    LPA     RE CONSTRUCTION      [facility]    LPA STENT  2/26/.2009    [facility]    [place]/ mitzi  age 3 days     done at [facility]    TONSILLECTOMY, ADENOIDECTOMY  11/2011    VENOGRAM, ANOMALOUS OR PERSISTENT VENA CAVA  3/15/2024    Procedure: VENOGRAM, ANOMALOUS OR PERSISTENT VENA CAVA;  Surgeon: Jimi Pollard Jr., MD;  Location: [facility] CATH LAB;  Service: Cardiology;;    VENOGRAM, VENOVENOUS COLLATERALS ORIGINATING BELOW THE HEART  3/15/2024    Procedure: VENOGRAM, VENOVENOUS COLLATERALS ORIGINATING BELOW THE HEART;  Surgeon: Jimi Pollard Jr., MD;  Location: [facility] CATH LAB;  Service: Cardiology;;    WISDOM TOOTH EXTRACTION       Family History   Problem Relation Name Age of Onset    No Known Problems Father      Urologic Abnormality Other mggm     Thyroid disease Mother       "No Known Problems Maternal Grandmother      Hypertension Maternal Grandfather      Skin cancer Maternal Grandfather      Hypertension Paternal Grandmother      Glaucoma Paternal Grandmother      No Known Problems Sister      No Known Problems Brother      No Known Problems Maternal Aunt      No Known Problems Maternal Uncle      No Known Problems Paternal Aunt      No Known Problems Paternal Uncle      No Known Problems Paternal Grandfather      Cancer Other greatgrand father 80        colon    Heart disease Neg Hx      Diabetes Neg Hx      Retinal detachment Neg Hx      Amblyopia Neg Hx      Blindness Neg Hx      Strabismus Neg Hx      Macular degeneration Neg Hx      Stroke Neg Hx      Breast cancer Neg Hx      Ovarian cancer Neg Hx      Esophageal cancer Neg Hx         Social History[1]     Physical Exam      Vital Signs Reviewed   /86 (Patient Position: Sitting)   Pulse 81   Temp 98.8 °F (37.1 °C)   Resp 18   Ht 5' 7" (1.702 m)   Wt 95.3 kg (210 lb)   SpO2 (!) 88%   BMI 32.89 kg/m²        Procedures    Procedures     Labs     Results for orders placed or performed in visit on 03/04/25   POCT COVID-19 Rapid Screening    Collection Time: 03/04/25  1:13 PM   Result Value Ref Range    POC Rapid COVID Negative Negative     Acceptable Yes    POCT Influenza A/B Molecular    Collection Time: 03/04/25  1:30 PM   Result Value Ref Range    POC Molecular Influenza A Ag Negative Negative    POC Molecular Influenza B Ag Negative Negative     Acceptable Yes      *Note: Due to a large number of results and/or encounters for the requested time period, some results have not been displayed. A complete set of results can be found in Results Review.         Assessment:     1. Acute cough    2. Fatigue, unspecified type    3. Close exposure to COVID-19 virus        Plan:   Patient A&O x4 with no respiratory distress in clinic.  Patient with SpO2 88% room air states this is above her normal " average and denies shortness breath or chest pain.  Patient understands high concern for recent viral sick exposure acute cough and fatigue.  Patient understands discharge plan with symptomatic Rx in addition to OTC with backup hold and wait azithromycin prescription and encourage close follow-up with her physicians and strict emergency department precautions.  Patient verbalized understanding in his ready for discharge now.    Concern for viral upper respiratory illness today with recent COVID exposure.    Recommend rotate Tylenol and ibuprofen every 4-6 hours if needed for aches pains fever or chills.  Recommend decongestant antihistamine nasal spray daily over the next week as needed for upper respiratory symptoms.  Recommend Robitussin Delsym Dimetapp or prescription Phenergan DM if needed for severe cough congestion body aches and rest.  Cover cough at all times washing sanitized hands and surfaces regularly.  Encouraged plenty of water and noncarbonated fluids hydration rest over the next 5-7 days.  If not improving in the next 5-7 days or cough and fever persist recommend azithromycin antibiotic backup coverage.    Recommend follow-up with primary care physician in 1-2 weeks for re-evaluation if not improving.  Recommended emergency department evaluation immediately if respiratory symptoms worsen  Acute cough    Fatigue, unspecified type  -     POCT COVID-19 Rapid Screening  -     POCT Influenza A/B Molecular    Close exposure to COVID-19 virus    Other orders  -     predniSONE (DELTASONE) 10 MG tablet; Take 1 tablet (10 mg total) by mouth once daily. for 5 days  Dispense: 5 tablet; Refill: 0  -     promethazine-dextromethorphan (PROMETHAZINE-DM) 6.25-15 mg/5 mL Syrp; Take 5 mLs by mouth every 8 (eight) hours as needed (cough).  Dispense: 118 mL; Refill: 0  -     azithromycin (Z-MARTHA) 250 MG tablet; Take 2 tablets by mouth on day 1; Take 1 tablet by mouth on days 2-5  Dispense: 6 tablet; Refill: 0                          [1]   Social History  Tobacco Use    Smoking status: Every Day     Types: Vaping with nicotine     Start date: 2020     Passive exposure: Current    Smokeless tobacco: Never    Tobacco comments:     No TAMMY   Substance Use Topics    Alcohol use: Yes     Comment: 6 times a year 3-4 drinks    Drug use: No

## 2025-03-04 NOTE — PATIENT INSTRUCTIONS
Concern for viral upper respiratory illness today with recent COVID exposure.    Recommend rotate Tylenol and ibuprofen every 4-6 hours if needed for aches pains fever or chills.  Recommend decongestant antihistamine nasal spray daily over the next week as needed for upper respiratory symptoms.  Recommend Robitussin Delsym Dimetapp or prescription Phenergan DM if needed for severe cough congestion body aches and rest.  Cover cough at all times washing sanitized hands and surfaces regularly.  Encouraged plenty of water and noncarbonated fluids hydration rest over the next 5-7 days.  If not improving in the next 5-7 days or cough and fever persist recommend azithromycin antibiotic backup coverage.    Recommend follow-up with primary care physician in 1-2 weeks for re-evaluation if not improving.  Recommended emergency department evaluation immediately if respiratory symptoms worsen

## 2025-03-05 ENCOUNTER — HOSPITAL ENCOUNTER (EMERGENCY)
Facility: HOSPITAL | Age: 25
Discharge: LEFT BEFORE DISCHARGE | End: 2025-03-05
Payer: COMMERCIAL

## 2025-03-05 VITALS
RESPIRATION RATE: 20 BRPM | OXYGEN SATURATION: 88 % | TEMPERATURE: 100 F | SYSTOLIC BLOOD PRESSURE: 134 MMHG | HEIGHT: 67 IN | HEART RATE: 110 BPM | WEIGHT: 210 LBS | DIASTOLIC BLOOD PRESSURE: 90 MMHG | BODY MASS INDEX: 32.96 KG/M2

## 2025-03-05 DIAGNOSIS — R06.02 SHORTNESS OF BREATH: ICD-10-CM

## 2025-03-05 LAB
ALBUMIN SERPL-MCNC: 4.5 G/DL (ref 3.5–5)
ALBUMIN/GLOB SERPL: 1.3 RATIO (ref 1.1–2)
ALP SERPL-CCNC: 86 UNIT/L (ref 40–150)
ALT SERPL-CCNC: 21 UNIT/L (ref 0–55)
ANION GAP SERPL CALC-SCNC: 7 MEQ/L
AST SERPL-CCNC: 19 UNIT/L (ref 5–34)
B-HCG UR QL: NEGATIVE
BACTERIA #/AREA URNS AUTO: ABNORMAL /HPF
BASOPHILS # BLD AUTO: 0.05 X10(3)/MCL
BASOPHILS NFR BLD AUTO: 0.4 %
BILIRUB SERPL-MCNC: 1.4 MG/DL
BILIRUB UR QL STRIP.AUTO: NEGATIVE
BNP BLD-MCNC: 43.8 PG/ML
BUN SERPL-MCNC: 7.2 MG/DL (ref 7–18.7)
CALCIUM SERPL-MCNC: 9.9 MG/DL (ref 8.4–10.2)
CHLORIDE SERPL-SCNC: 106 MMOL/L (ref 98–107)
CLARITY UR: CLEAR
CO2 SERPL-SCNC: 25 MMOL/L (ref 22–29)
COLOR UR AUTO: ABNORMAL
CREAT SERPL-MCNC: 0.86 MG/DL (ref 0.55–1.02)
CREAT/UREA NIT SERPL: 8
EOSINOPHIL # BLD AUTO: 0.12 X10(3)/MCL (ref 0–0.9)
EOSINOPHIL NFR BLD AUTO: 1 %
ERYTHROCYTE [DISTWIDTH] IN BLOOD BY AUTOMATED COUNT: 12.7 % (ref 11.5–17)
FLUAV AG UPPER RESP QL IA.RAPID: DETECTED
FLUBV AG UPPER RESP QL IA.RAPID: NOT DETECTED
GFR SERPLBLD CREATININE-BSD FMLA CKD-EPI: >60 ML/MIN/1.73/M2
GLOBULIN SER-MCNC: 3.4 GM/DL (ref 2.4–3.5)
GLUCOSE SERPL-MCNC: 113 MG/DL (ref 74–100)
GLUCOSE UR QL STRIP: NORMAL
HCT VFR BLD AUTO: 49.1 % (ref 37–47)
HGB BLD-MCNC: 16.8 G/DL (ref 12–16)
HGB UR QL STRIP: ABNORMAL
IMM GRANULOCYTES # BLD AUTO: 0.05 X10(3)/MCL (ref 0–0.04)
IMM GRANULOCYTES NFR BLD AUTO: 0.4 %
INR PPP: 1.1
KETONES UR QL STRIP: NEGATIVE
LEUKOCYTE ESTERASE UR QL STRIP: 75
LYMPHOCYTES # BLD AUTO: 0.52 X10(3)/MCL (ref 0.6–4.6)
LYMPHOCYTES NFR BLD AUTO: 4.1 %
MCH RBC QN AUTO: 31.1 PG (ref 27–31)
MCHC RBC AUTO-ENTMCNC: 34.2 G/DL (ref 33–36)
MCV RBC AUTO: 90.8 FL (ref 80–94)
MONOCYTES # BLD AUTO: 0.81 X10(3)/MCL (ref 0.1–1.3)
MONOCYTES NFR BLD AUTO: 6.4 %
NEUTROPHILS # BLD AUTO: 11.02 X10(3)/MCL (ref 2.1–9.2)
NEUTROPHILS NFR BLD AUTO: 87.7 %
NITRITE UR QL STRIP: NEGATIVE
NRBC BLD AUTO-RTO: 0 %
PH UR STRIP: 8 [PH]
PLATELET # BLD AUTO: 201 X10(3)/MCL (ref 130–400)
PMV BLD AUTO: 10.7 FL (ref 7.4–10.4)
POTASSIUM SERPL-SCNC: 4.4 MMOL/L (ref 3.5–5.1)
PROT SERPL-MCNC: 7.9 GM/DL (ref 6.4–8.3)
PROT UR QL STRIP: NEGATIVE
PROTHROMBIN TIME: 13.9 SECONDS (ref 12.5–14.5)
RBC # BLD AUTO: 5.41 X10(6)/MCL (ref 4.2–5.4)
RBC #/AREA URNS AUTO: ABNORMAL /HPF
SARS-COV-2 RNA RESP QL NAA+PROBE: NOT DETECTED
SODIUM SERPL-SCNC: 138 MMOL/L (ref 136–145)
SP GR UR STRIP.AUTO: 1.02 (ref 1–1.03)
SQUAMOUS #/AREA URNS LPF: ABNORMAL /HPF
STREP A PCR (OHS): NOT DETECTED
TROPONIN I SERPL-MCNC: 0.01 NG/ML (ref 0–0.04)
UROBILINOGEN UR STRIP-ACNC: NORMAL
WBC # BLD AUTO: 12.57 X10(3)/MCL (ref 4.5–11.5)
WBC #/AREA URNS AUTO: ABNORMAL /HPF

## 2025-03-05 PROCEDURE — 93005 ELECTROCARDIOGRAM TRACING: CPT

## 2025-03-05 PROCEDURE — 93010 ELECTROCARDIOGRAM REPORT: CPT | Mod: ,,, | Performed by: STUDENT IN AN ORGANIZED HEALTH CARE EDUCATION/TRAINING PROGRAM

## 2025-03-05 PROCEDURE — 87651 STREP A DNA AMP PROBE: CPT

## 2025-03-05 PROCEDURE — 0240U COVID/FLU A&B PCR: CPT

## 2025-03-05 PROCEDURE — 81025 URINE PREGNANCY TEST: CPT

## 2025-03-05 PROCEDURE — 85025 COMPLETE CBC W/AUTO DIFF WBC: CPT

## 2025-03-05 PROCEDURE — 81015 MICROSCOPIC EXAM OF URINE: CPT

## 2025-03-05 PROCEDURE — 99900041 HC LEFT WITHOUT BEING SEEN- EMERGENCY

## 2025-03-05 PROCEDURE — 85610 PROTHROMBIN TIME: CPT

## 2025-03-05 PROCEDURE — 83880 ASSAY OF NATRIURETIC PEPTIDE: CPT

## 2025-03-05 PROCEDURE — 84484 ASSAY OF TROPONIN QUANT: CPT

## 2025-03-05 PROCEDURE — 80053 COMPREHEN METABOLIC PANEL: CPT

## 2025-03-05 NOTE — FIRST PROVIDER EVALUATION
"Medical screening examination initiated.  I have conducted a focused provider triage encounter, findings are as follows:    Brief history of present illness:  24 year old female presents to ER with c/o SOB x 1 day. Patient reports cough, congestion, and fever x 3 days. 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 Cardiologist Dr. Jimi Pollard    Vitals:    03/05/25 1359   BP: (!) 134/90   Pulse: 110   Resp: 20   Temp: 99.6 °F (37.6 °C)   TempSrc: Oral   SpO2: (!) 88%   Weight: 95.3 kg (210 lb)   Height: 5' 7" (1.702 m)       Pertinent physical exam:  awake and alert, nad    Brief workup plan:  labs, EKG, cxr    Preliminary workup initiated; this workup will be continued and followed by the physician or advanced practice provider that is assigned to the patient when roomed.  "

## 2025-03-07 LAB
OHS QRS DURATION: 100 MS
OHS QTC CALCULATION: 477 MS

## 2025-03-12 ENCOUNTER — OFFICE VISIT (OUTPATIENT)
Dept: URGENT CARE | Facility: CLINIC | Age: 25
End: 2025-03-12
Payer: COMMERCIAL

## 2025-03-12 VITALS
TEMPERATURE: 98 F | SYSTOLIC BLOOD PRESSURE: 128 MMHG | WEIGHT: 210 LBS | RESPIRATION RATE: 18 BRPM | HEART RATE: 86 BPM | BODY MASS INDEX: 32.96 KG/M2 | DIASTOLIC BLOOD PRESSURE: 84 MMHG | OXYGEN SATURATION: 86 % | HEIGHT: 67 IN

## 2025-03-12 DIAGNOSIS — J32.9 SINUSITIS, UNSPECIFIED CHRONICITY, UNSPECIFIED LOCATION: Primary | ICD-10-CM

## 2025-03-12 PROCEDURE — 99213 OFFICE O/P EST LOW 20 MIN: CPT | Mod: 25,,, | Performed by: FAMILY MEDICINE

## 2025-03-12 PROCEDURE — 96372 THER/PROPH/DIAG INJ SC/IM: CPT | Mod: ,,, | Performed by: FAMILY MEDICINE

## 2025-03-12 RX ORDER — DOXYCYCLINE 100 MG/1
100 CAPSULE ORAL 2 TIMES DAILY
Qty: 14 CAPSULE | Refills: 0 | Status: SHIPPED | OUTPATIENT
Start: 2025-03-12 | End: 2025-03-19

## 2025-03-12 RX ORDER — BETAMETHASONE SODIUM PHOSPHATE AND BETAMETHASONE ACETATE 3; 3 MG/ML; MG/ML
9 INJECTION, SUSPENSION INTRA-ARTICULAR; INTRALESIONAL; INTRAMUSCULAR; SOFT TISSUE
Status: COMPLETED | OUTPATIENT
Start: 2025-03-12 | End: 2025-03-12

## 2025-03-12 RX ADMIN — BETAMETHASONE SODIUM PHOSPHATE AND BETAMETHASONE ACETATE 9 MG: 3; 3 INJECTION, SUSPENSION INTRA-ARTICULAR; INTRALESIONAL; INTRAMUSCULAR; SOFT TISSUE at 09:03

## 2025-03-12 NOTE — LETTER
March 12, 2025      [facility]  [address] [address]  Phone: [phone]       Patient: Jil Lennon   YOB: 2000  Date of Visit: 03/12/2025    To Whom It May Concern:    Reagan Lennon  was at [facility] on 03/12/2025. The patient may return to work/school on 03/13/2025 with no restrictions. If you have any questions or concerns, or if I can be of further assistance, please do not hesitate to contact me.    Sincerely,    Fanny Martel MA

## 2025-03-12 NOTE — PROGRESS NOTES
"Subjective:      Patient ID: Jil Lennon is a 24 y.o. female.    Vitals:  height is 5' 7" (1.702 m) and weight is 95.3 kg (210 lb). Her temperature is 97.9 °F (36.6 °C). Her blood pressure is 128/84 and her pulse is 86. Her respiration is 18 and oxygen saturation is 86% (abnormal).     Chief Complaint: Cough     Patient is a 24 y.o. female who presents to urgent care with complaints of productive cough green/yellow mucus, sinus congestion, night sweats, sinus HA x10 days.  Alleviating factors include mucinex, prednisone  10 mg with mild amount of relief. Patient denies fever, BA, nausea, vomiting, diarrhea.  States her highest temperature reading was 100.2 a few days ago.  States she did go to the ER on the 5th after her visit here on the 4th.  Patient states All testing was negative.  Had a chest x-ray that was negative however she left without being seen and it turns out that she did have a positive flu test which was never treated then.  Denies any shortness of breath beyond her baseline.  Denies any vomiting or diarrhea.  Didn't not take ZPAC from last visit 03/04/2025  Patient has history of hypoplastic left heart syndrome and states her O2 is typically in the upper 80's, states this is normal for her.     Cough        Constitution: Negative.   HENT:  Positive for congestion.    Cardiovascular: Negative.    Eyes: Negative.    Respiratory:  Positive for cough.    Gastrointestinal: Negative.    Genitourinary: Negative.    Musculoskeletal: Negative.    Skin: Negative.    Allergic/Immunologic: Negative.    Neurological: Negative.    Hematologic/Lymphatic: Negative.       Objective:     Physical Exam   Constitutional: She is oriented to person, place, and time. She appears well-developed. She is cooperative.  Non-toxic appearance. She does not appear ill. No distress.   HENT:   Head: Normocephalic and atraumatic.   Ears:   Right Ear: Hearing and external ear normal.   Left Ear: Hearing, tympanic membrane and " external ear normal.   Mouth/Throat: Mucous membranes are normal. Posterior oropharyngeal erythema (Thick postnasal drip) present. No oropharyngeal exudate.   Eyes: Conjunctivae and lids are normal.   Neck: Trachea normal and phonation normal. Neck supple. No edema present. No erythema present. No neck rigidity present.   Cardiovascular: Normal rate.   Pulmonary/Chest: Effort normal and breath sounds normal. No stridor. No respiratory distress. She has no decreased breath sounds. She has no wheezes. She has no rhonchi. She has no rales.   Abdominal: Normal appearance.   Lymphadenopathy:     She has no cervical adenopathy.   Neurological: She is alert and oriented to person, place, and time. She exhibits normal muscle tone. Coordination normal.   Skin: Skin is warm, dry, intact, not diaphoretic and no rash.   Psychiatric: Her speech is normal and behavior is normal. Mood, judgment and thought content normal.   Nursing note and vitals reviewed.         Previous History      Review of patient's allergies indicates:   Allergen Reactions    Adhesive Dermatitis       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    Chylothorax     Congenital absence of pulmonary artery     to [place]    Dyspnea     Encounter for blood transfusion     Fracture of wrist     x4    General anesthetics causing adverse effect in therapeutic use     was mean as a child when waking up after tonsillectomy    Hypoplastic left heart     Liver disease     enlarged liver    Obstructive sleep apnea     resolved by T&A    Obstructive sleep apnea (adult) (pediatric)     Post-Fontan protein-losing enteropathy     Stroke     mother states possibly had stroke at 3 y/o    TIA (transient ischemic attack) 10/03/2023    Tonsillar and adenoid hypertrophy      Current Outpatient Medications   Medication Instructions    ALPRAZolam (XANAX) 0.5 mg, Nightly    aspirin 81 mg, Nightly    digoxin  (LANOXIN) 0.125 mg, Oral, Nightly    doxycycline (MONODOX) 100 mg, Oral, 2 times daily    enalapril (VASOTEC) 2.5 MG tablet TAKE 1 TABLET BY MOUTH TWICE A DAY    furosemide (LASIX) 20 mg, Oral, 2 times daily    gentamicin (GARAMYCIN) 0.1 % ointment SMARTSI Topical Daily    lisdexamfetamine (VYVANSE) 30 mg, Every morning    LOPROX 1 % shampoo SMARTSIG:Sparingly Topical 2-3 Times Weekly PRN    rivaroxaban (XARELTO) 10 mg, Oral, With dinner    sertraline (ZOLOFT) 125 mg, Daily    sildenafil (REVATIO) 20 mg, Oral, 2 times daily, (pt taking twice a day)    spironolactone (ALDACTONE) 25 mg, Oral, 2 times daily    tretinoin (RETIN-A) 0.025 % gel Nightly    VRAYLAR 3 mg, Daily     Past Surgical History:   Procedure Laterality Date    AORTOGRAM, PEDIATRIC  3/15/2024    Procedure: Aortogram, Pediatric;  Surgeon: Jimi Pollard Jr., MD;  Location: [facility] CATH LAB;  Service: Cardiology;;    BIDIRECTIONAL JOSE W/ ATRIAL SEPTECTOMY      [facility]    CARDIAC SURGERY      CATHETER REFENESTRATION  2005     OF    COARCTATION STENT  3/9/11    COLONOSCOPY N/A 2021    Procedure: COLONOSCOPY;  Surgeon: Benigno Bowers MD;  Location: [facility] ENDO ([address] [place] [address]);  Service: Endoscopy;  Laterality: N/A;    COMBINED RIGHT AND RETROGRADE LEFT HEART CATHETERIZATION FOR CONGENITAL HEART DEFECT N/A 3/7/2019    Procedure: CATHETERIZATION, HEART, COMBINED RIGHT AND RETROGRADE LEFT, FOR CONGENITAL HEART DEFECT;  Surgeon: Jimi Pollard Jr., MD;  Location: [facility] CATH LAB;  Service: Cardiology;  Laterality: N/A;  ped heart    COMBINED RIGHT AND RETROGRADE LEFT HEART CATHETERIZATION FOR CONGENITAL HEART DEFECT N/A 3/15/2024    Procedure: Catheterization, Heart, Combined Right and Retrograde Left, for Congenital Heart Defect;  Surgeon: Jimi Pollard Jr., MD;  Location: [facility] CATH LAB;  Service: Cardiology;  Laterality: N/A;    ESOPHAGOGASTRODUODENOSCOPY N/A 2021    Procedure: EGD (ESOPHAGOGASTRODUODENOSCOPY);  Surgeon: Benigno FENTON  MD Robinson;  Location: [facility] ENDO (2ND FLR);  Service: Endoscopy;  Laterality: N/A;  Patient will need pediatric heart anesthesiologist due to history of Fontan     mother states having formed stool         [facility]    FONTAN PROCEDURE, EXTRACARDIAC  9/27/2004    [facility]    LPA     [facility]    L[facility] STENT  2/26/.2009    [facility]    [place]/ mitzi  age 3 days     done at [facility]    TONSILLECTOMY, ADENOIDECTOMY  11/2011    VENOGRAM, ANOMALOUS OR PERSISTENT VENA CAVA  3/15/2024    Procedure: VENOGRAM, ANOMALOUS OR PERSISTENT VENA CAVA;  Surgeon: Jimi Pollard Jr., MD;  Location: [facility] CATH LAB;  Service: Cardiology;;    VENOGRAM, VENOVENOUS COLLATERALS ORIGINATING BELOW THE HEART  3/15/2024    Procedure: VENOGRAM, VENOVENOUS COLLATERALS ORIGINATING BELOW THE HEART;  Surgeon: Jimi Pollard Jr., MD;  Location: [facility] CATH LAB;  Service: Cardiology;;    WISDOM TOOTH EXTRACTION       Family History   Problem Relation Name Age of Onset    No Known Problems Father      Urologic Abnormality Other mggm     Thyroid disease Mother      No Known Problems Maternal Grandmother      Hypertension Maternal Grandfather      Skin cancer Maternal Grandfather      Hypertension Paternal Grandmother      Glaucoma Paternal Grandmother      No Known Problems Sister      No Known Problems Brother      No Known Problems Maternal Aunt      No Known Problems Maternal Uncle      No Known Problems Paternal Aunt      No Known Problems Paternal Uncle      No Known Problems Paternal Grandfather      Cancer Other greatgrand father 80        colon    Heart disease Neg Hx      Diabetes Neg Hx      Retinal detachment Neg Hx      Amblyopia Neg Hx      Blindness Neg Hx      Strabismus Neg Hx      Macular degeneration Neg Hx      Stroke Neg Hx      Breast cancer Neg Hx      Ovarian cancer Neg Hx      Esophageal cancer Neg Hx         Social History[1]     Physical Exam      Vital Signs Reviewed   /84   Pulse  "86   Temp 97.9 °F (36.6 °C)   Resp 18   Ht 5' 7" (1.702 m)   Wt 95.3 kg (210 lb)   SpO2 (!) 86%   BMI 32.89 kg/m²        Procedures    Procedures     Labs     Results for orders placed or performed during the hospital encounter of 03/05/25   EKG 12-lead    Collection Time: 03/05/25  2:00 PM   Result Value Ref Range    QRS Duration 100 ms    [facility] QTC Calculation 477 ms   COVID/FLU A&B PCR    Collection Time: 03/05/25  2:05 PM   Result Value Ref Range    Influenza A PCR Detected (A) Not Detected    Influenza B PCR Not Detected Not Detected    SARS-CoV-2 PCR Not Detected Not Detected, Negative   Strep Group A by PCR    Collection Time: 03/05/25  2:05 PM   Result Value Ref Range    STREP A PCR ([facility]) Not Detected Not Detected   Comprehensive metabolic panel    Collection Time: 03/05/25  2:11 PM   Result Value Ref Range    Sodium 138 136 - 145 mmol/L    Potassium 4.4 3.5 - 5.1 mmol/L    Chloride 106 98 - 107 mmol/L    CO2 25 22 - 29 mmol/L    Glucose 113 (H) 74 - 100 mg/dL    Blood Urea Nitrogen 7.2 7.0 - 18.7 mg/dL    Creatinine 0.86 0.55 - 1.02 mg/dL    Calcium 9.9 8.4 - 10.2 mg/dL    Protein Total 7.9 6.4 - 8.3 gm/dL    Albumin 4.5 3.5 - 5.0 g/dL    Globulin 3.4 2.4 - 3.5 gm/dL    Albumin/Globulin Ratio 1.3 1.1 - 2.0 ratio    Bilirubin Total 1.4 <=1.5 mg/dL    ALP 86 40 - 150 unit/L    ALT 21 0 - 55 unit/L    AST 19 5 - 34 unit/L    eGFR >60 mL/min/1.73/m2    Anion Gap 7.0 mEq/L    BUN/Creatinine Ratio 8    Protime-INR    Collection Time: 03/05/25  2:11 PM   Result Value Ref Range    PT 13.9 12.5 - 14.5 seconds    INR 1.1 <=1.3   Troponin I    Collection Time: 03/05/25  2:11 PM   Result Value Ref Range    Troponin-I 0.010 0.000 - 0.045 ng/mL   Brain natriuretic peptide    Collection Time: 03/05/25  2:11 PM   Result Value Ref Range    Natriuretic Peptide 43.8 <=100.0 pg/mL   CBC with Differential    Collection Time: 03/05/25  2:11 PM   Result Value Ref Range    WBC 12.57 (H) 4.50 - 11.50 x10(3)/mcL    RBC 5.41 (H) " 4.20 - 5.40 x10(6)/mcL    Hgb 16.8 (H) 12.0 - 16.0 g/dL    Hct 49.1 (H) 37.0 - 47.0 %    MCV 90.8 80.0 - 94.0 fL    MCH 31.1 (H) 27.0 - 31.0 pg    MCHC 34.2 33.0 - 36.0 g/dL    RDW 12.7 11.5 - 17.0 %    Platelet 201 130 - 400 x10(3)/mcL    MPV 10.7 (H) 7.4 - 10.4 fL    Neut % 87.7 %    Lymph % 4.1 %    Mono % 6.4 %    Eos % 1.0 %    Basophil % 0.4 %    Imm Grans % 0.4 %    Neut # 11.02 (H) 2.1 - 9.2 x10(3)/mcL    Lymph # 0.52 (L) 0.6 - 4.6 x10(3)/mcL    Mono # 0.81 0.1 - 1.3 x10(3)/mcL    Eos # 0.12 0 - 0.9 x10(3)/mcL    Baso # 0.05 <=0.2 x10(3)/mcL    Imm Gran # 0.05 (H) 0.00 - 0.04 x10(3)/mcL    NRBC% 0.0 %   Urinalysis, Reflex to Urine Culture    Collection Time: 03/05/25  2:41 PM    Specimen: Urine   Result Value Ref Range    Color, UA Light-Yellow Yellow, Light-Yellow, Colorless, Straw, Dark-Yellow    Appearance, UA Clear Clear    Specific Gravity, UA 1.016 1.005 - 1.030    pH, UA 8.0 5.0 - 8.5    Protein, UA Negative Negative    Glucose, UA Normal Negative, Normal    Ketones, UA Negative Negative    Blood, UA Trace (A) Negative    Bilirubin, UA Negative Negative    Urobilinogen, UA Normal 0.2, 1.0, Normal    Nitrites, UA Negative Negative    Leukocyte Esterase, UA 75 (A) Negative    RBC, UA 0-5 None Seen, 0-2, 3-5, 0-5 /HPF    WBC, UA 0-5 None Seen, 0-2, 3-5, 0-5 /HPF    Bacteria, UA Trace None Seen, Trace /HPF    Squamous Epithelial Cells, UA Trace None Seen, Trace /HPF   Pregnancy, urine rapid    Collection Time: 03/05/25  2:41 PM   Result Value Ref Range    hCG Qualitative, Urine Negative Negative     *Note: Due to a large number of results and/or encounters for the requested time period, some results have not been displayed. A complete set of results can be found in Results Review.       Assessment:     1. Sinusitis, unspecified chronicity, unspecified location        Plan:   Medications sent to pharmacy  Start the antibiotics today  Start taking an allergy pill daily such as claritin, zyrtec, allegrea or  xyzal. Also start using a nasal steroid spray such as flonase or nasacort daily. If you are not being treated for high blood pressure, you can also take decongestant such as sudafed as needed. They can be purchased over the counter.  Monitor for fever. Take tylenol/acetaminophen or ibuprofen as needed. Rest and hydrate. If symptoms persist or worsen, return to clinic or seek medical attention immediately.       Sinusitis, unspecified chronicity, unspecified location    Other orders  -     doxycycline (MONODOX) 100 MG capsule; Take 1 capsule (100 mg total) by mouth 2 (two) times daily. for 7 days  Dispense: 14 capsule; Refill: 0  -     betamethasone acetate-betamethasone sodium phosphate injection 9 mg                         [1]   Social History  Tobacco Use    Smoking status: Every Day     Types: Vaping with nicotine     Start date: 2020     Passive exposure: Current    Smokeless tobacco: Never    Tobacco comments:     No TAMMY   Substance Use Topics    Alcohol use: Yes     Comment: 6 times a year 3-4 drinks    Drug use: No

## 2025-03-12 NOTE — PATIENT INSTRUCTIONS
Plan:   Probable sinus infection after having the flu  Medications sent to pharmacy  Start the antibiotics today  Start taking an allergy pill daily such as claritin, zyrtec, allegrea or xyzal. Also start using a nasal steroid spray such as flonase or nasacort daily. If you are not being treated for high blood pressure, you can also take decongestant such as sudafed as needed. They can be purchased over the counter.  Monitor for fever. Take tylenol/acetaminophen or ibuprofen as needed. Rest and hydrate. If symptoms persist or worsen, return to clinic or seek medical attention immediately.

## 2025-03-26 ENCOUNTER — PATIENT MESSAGE (OUTPATIENT)
Dept: OPTOMETRY | Facility: CLINIC | Age: 25
End: 2025-03-26
Payer: COMMERCIAL

## 2025-03-26 ENCOUNTER — PATIENT MESSAGE (OUTPATIENT)
Dept: PEDIATRIC GASTROENTEROLOGY | Facility: CLINIC | Age: 25
End: 2025-03-26
Payer: COMMERCIAL

## 2025-03-26 ENCOUNTER — PATIENT MESSAGE (OUTPATIENT)
Dept: PEDIATRIC CARDIOLOGY | Facility: CLINIC | Age: 25
End: 2025-03-26
Payer: COMMERCIAL

## 2025-03-27 DIAGNOSIS — Z98.890 S/P FONTAN PROCEDURE: Primary | ICD-10-CM

## 2025-04-29 ENCOUNTER — PATIENT MESSAGE (OUTPATIENT)
Dept: PEDIATRIC GASTROENTEROLOGY | Facility: CLINIC | Age: 25
End: 2025-04-29
Payer: COMMERCIAL

## 2025-04-29 ENCOUNTER — PATIENT MESSAGE (OUTPATIENT)
Dept: PEDIATRIC CARDIOLOGY | Facility: CLINIC | Age: 25
End: 2025-04-29
Payer: COMMERCIAL

## 2025-04-29 DIAGNOSIS — Q25.1 AORTA COARCTATION: Primary | ICD-10-CM

## 2025-04-29 DIAGNOSIS — Q25.6 PULMONARY ARTERY STENOSIS OF CENTRAL BRANCH: ICD-10-CM

## 2025-04-29 DIAGNOSIS — Q25.79: ICD-10-CM

## 2025-04-29 DIAGNOSIS — Q27.30 AVM (ARTERIOVENOUS MALFORMATION): ICD-10-CM

## 2025-04-29 DIAGNOSIS — Q23.4 HLHS (HYPOPLASTIC LEFT HEART SYNDROME): ICD-10-CM

## 2025-05-11 ENCOUNTER — PATIENT MESSAGE (OUTPATIENT)
Dept: OPTOMETRY | Facility: CLINIC | Age: 25
End: 2025-05-11
Payer: COMMERCIAL

## 2025-06-02 ENCOUNTER — PATIENT MESSAGE (OUTPATIENT)
Dept: OPTOMETRY | Facility: CLINIC | Age: 25
End: 2025-06-02
Payer: COMMERCIAL

## 2025-06-06 ENCOUNTER — TELEPHONE (OUTPATIENT)
Dept: PEDIATRIC GASTROENTEROLOGY | Facility: CLINIC | Age: 25
End: 2025-06-06
Payer: COMMERCIAL

## 2025-06-09 ENCOUNTER — CLINICAL SUPPORT (OUTPATIENT)
Dept: PEDIATRIC CARDIOLOGY | Facility: CLINIC | Age: 25
End: 2025-06-09
Payer: COMMERCIAL

## 2025-06-09 ENCOUNTER — OFFICE VISIT (OUTPATIENT)
Dept: PEDIATRIC GASTROENTEROLOGY | Facility: CLINIC | Age: 25
End: 2025-06-09
Payer: COMMERCIAL

## 2025-06-09 ENCOUNTER — HOSPITAL ENCOUNTER (OUTPATIENT)
Dept: PEDIATRIC CARDIOLOGY | Facility: HOSPITAL | Age: 25
Discharge: HOME | End: 2025-06-09
Attending: PEDIATRICS
Payer: COMMERCIAL

## 2025-06-09 ENCOUNTER — HOSPITAL ENCOUNTER (OUTPATIENT)
Dept: RADIOLOGY | Facility: HOSPITAL | Age: 25
Discharge: HOME | End: 2025-06-09
Attending: PEDIATRICS
Payer: COMMERCIAL

## 2025-06-09 ENCOUNTER — OFFICE VISIT (OUTPATIENT)
Dept: PEDIATRIC CARDIOLOGY | Facility: CLINIC | Age: 25
End: 2025-06-09
Payer: COMMERCIAL

## 2025-06-09 VITALS
SYSTOLIC BLOOD PRESSURE: 145 MMHG | WEIGHT: 206.13 LBS | HEART RATE: 79 BPM | BODY MASS INDEX: 31.24 KG/M2 | DIASTOLIC BLOOD PRESSURE: 66 MMHG | OXYGEN SATURATION: 85 % | HEIGHT: 68 IN

## 2025-06-09 VITALS — WEIGHT: 211.63 LBS | BODY MASS INDEX: 32.07 KG/M2 | HEIGHT: 68 IN

## 2025-06-09 DIAGNOSIS — R23.0 CYANOSIS: ICD-10-CM

## 2025-06-09 DIAGNOSIS — R79.89 LOW VITAMIN D LEVEL: ICD-10-CM

## 2025-06-09 DIAGNOSIS — Q25.79: ICD-10-CM

## 2025-06-09 DIAGNOSIS — Q27.30 AVM (ARTERIOVENOUS MALFORMATION): ICD-10-CM

## 2025-06-09 DIAGNOSIS — Q23.4 HLHS (HYPOPLASTIC LEFT HEART SYNDROME): ICD-10-CM

## 2025-06-09 DIAGNOSIS — Q25.6 PULMONARY ARTERY STENOSIS OF CENTRAL BRANCH: ICD-10-CM

## 2025-06-09 DIAGNOSIS — Q25.1 AORTA COARCTATION: ICD-10-CM

## 2025-06-09 DIAGNOSIS — F32.5 MAJOR DEPRESSIVE DISORDER IN REMISSION, UNSPECIFIED WHETHER RECURRENT: ICD-10-CM

## 2025-06-09 DIAGNOSIS — Z87.74 S/P FONTAN PROCEDURE: ICD-10-CM

## 2025-06-09 DIAGNOSIS — Q23.4 HLHS (HYPOPLASTIC LEFT HEART SYNDROME): Primary | ICD-10-CM

## 2025-06-09 DIAGNOSIS — Z87.74 S/P FONTAN PROCEDURE: Primary | ICD-10-CM

## 2025-06-09 DIAGNOSIS — K76.1 CHRONIC PASSIVE CONGESTION OF LIVER: ICD-10-CM

## 2025-06-09 PROCEDURE — 93320 DOPPLER ECHO COMPLETE: CPT | Mod: 26,,, | Performed by: PEDIATRICS

## 2025-06-09 PROCEDURE — 99214 OFFICE O/P EST MOD 30 MIN: CPT | Mod: S$GLB,,, | Performed by: PEDIATRICS

## 2025-06-09 PROCEDURE — 76981 USE PARENCHYMA: CPT | Mod: TC

## 2025-06-09 PROCEDURE — 99999 PR PBB SHADOW E&M-EST. PATIENT-LVL III: CPT | Mod: PBBFAC,,, | Performed by: PEDIATRICS

## 2025-06-09 PROCEDURE — 93325 DOPPLER ECHO COLOR FLOW MAPG: CPT

## 2025-06-09 PROCEDURE — 93303 ECHO TRANSTHORACIC: CPT | Mod: 26,,, | Performed by: PEDIATRICS

## 2025-06-09 PROCEDURE — 93000 ELECTROCARDIOGRAM COMPLETE: CPT | Mod: S$GLB,,, | Performed by: STUDENT IN AN ORGANIZED HEALTH CARE EDUCATION/TRAINING PROGRAM

## 2025-06-09 PROCEDURE — 99999 PR PBB SHADOW E&M-EST. PATIENT-LVL I: CPT | Mod: PBBFAC,,,

## 2025-06-09 PROCEDURE — 93325 DOPPLER ECHO COLOR FLOW MAPG: CPT | Mod: 26,,, | Performed by: PEDIATRICS

## 2025-06-09 PROCEDURE — 99999 PR PBB SHADOW E&M-EST. PATIENT-LVL IV: CPT | Mod: PBBFAC,,, | Performed by: PEDIATRICS

## 2025-06-09 PROCEDURE — 76705 ECHO EXAM OF ABDOMEN: CPT | Mod: TC

## 2025-06-09 PROCEDURE — G2211 COMPLEX E/M VISIT ADD ON: HCPCS | Mod: S$GLB,,, | Performed by: PEDIATRICS

## 2025-06-09 PROCEDURE — 99215 OFFICE O/P EST HI 40 MIN: CPT | Mod: 25,S$GLB,, | Performed by: PEDIATRICS

## 2025-06-09 NOTE — PROGRESS NOTES
Subjective:      Patient ID: Jil Lennon is a 24 y.o. female.    Chief Complaint: Follow-up    Complications of Liver Disease  1. Ascites:  no  2. SBP:  no  3. Varices: unknown   4. Acute variceal hemorrhage:  no  5. Encephalopathy:  no  6. Hepatorenal syndrome:  no  7. Hepatopulmonary syndrome:  no  8. Growth failure:  no  9. Cholangitis:  no  10. Pathological fracture:  no  11. Pruritus:  no    Liver-related Investigations and Screening  1. Liver biopsy:  no  2. EGD:  no  3. Colonoscopy:  no  4. ERCP:  no  5. Paracentesis:  no  6. PTC:    no  7. US: 3/2024-HM 18.4 cm, no FLL, no ascites, spleen 13.3 cm, 1.38 m/s  12/2022-no FLL, no HM, mild SM (12.7), 1.51 m/s, 6.96 kPa  4/2021-no FLL, +HSM, elastog 1.3 m/s, 5.4 kPa  10/1/2019-no ascites, no FLLs, spleen ULN size, baseline elastography 1.3 m/s (F0-1)  8. MR:  no  9. AFP: 1.9 (4/2021), <2 (2/2022),     Liver-related Medications  none    Calculated PELD/MELD:  MELD 3.0: 8 at 6/9/2025 12:10 PM  MELD-Na: 7 at 6/9/2025 12:10 PM  Calculated from:  Serum Creatinine: 0.7 mg/dL (Using min of 1 mg/dL) at 6/9/2025 12:10 PM  Serum Sodium: 140 mmol/L (Using max of 137 mmol/L) at 6/9/2025 12:10 PM  Total Bilirubin: 0.8 mg/dL (Using min of 1 mg/dL) at 6/9/2025 12:10 PM  Serum Albumin: 4.7 g/dL (Using max of 3.5 g/dL) at 6/9/2025 12:10 PM  INR(ratio): 1.1 at 6/9/2025 12:10 PM  Age at listing (hypothetical): 24 years  Sex: Female at 6/9/2025 12:10 PM         [facility]  [facility]      24 yr old female with Fontan palliation of HLHS seen in follow-up for FALD.    Doing well overall.  Moving into the guest house this summer.  Had a good checkup with the cardiologist this morning.  Planning to go to  to be trained as a .        Patient is accompanied by mom, who provided independent history.              Review of Systems   Constitutional:  Negative for activity change and unexpected weight change.   Gastrointestinal:   Negative for abdominal distention and abdominal pain.   Skin:  Negative for color change.   Allergic/Immunologic: Negative for immunocompromised state.   Hematological:  Bruises/bleeds easily.       Objective:   Physical Exam  Vitals reviewed.   Constitutional:       General: She is not in acute distress.  Eyes:      General: No scleral icterus.  Cardiovascular:      Rate and Rhythm: Normal rate.   Pulmonary:      Effort: Pulmonary effort is normal. No respiratory distress.   Abdominal:      General: There is no distension.      Palpations: Abdomen is soft. There is no hepatomegaly or splenomegaly.      Tenderness: There is no abdominal tenderness.   Musculoskeletal:         General: No swelling.   Skin:     Coloration: Skin is not jaundiced.   Neurological:      Mental Status: She is alert.   Psychiatric:         Mood and Affect: Mood normal.         Behavior: Behavior normal.         Thought Content: Thought content normal.         Labs/Imaging:     Component      Latest Ref [facility] 6/9/2025   WBC      3.90 - 12.70 K/uL 8.41    RBC      4.00 - 5.40 M/uL 5.08    Hemoglobin      12.0 - 16.0 gm/dL 15.8    Hematocrit      37.0 - 48.5 % 45.5    MCV      82 - 98 fL 90    MCHC      32.0 - 36.0 g/dL 34.7    RDW      11.5 - 14.5 % 12.5    Platelet Count      150 - 450 K/uL 201    MCH      27.0 - 31.0 pg 31.1 (H)    MPV      9.2 - 12.9 fL 10.8    Sodium      136 - 145 mmol/L 140    Potassium      3.5 - 5.1 mmol/L 4.0    Chloride      95 - 110 mmol/L 106    CO2      23 - 29 mmol/L 21 (L)    Glucose      70 - 110 mg/dL 78    BUN      6 - 20 mg/dL 14    Creatinine      0.5 - 1.4 mg/dL 0.7    Calcium      8.7 - 10.5 mg/dL 9.5    Anion Gap      8 - 16 mmol/L 13    eGFR      >60 mL/min/1.73/m2 >60    PROTEIN TOTAL      6.0 - 8.4 gm/dL 7.4    Albumin      3.5 - 5.2 g/dL 4.7    BILIRUBIN TOTAL      0.1 - 1.0 mg/dL 0.8    Bilirubin Direct      0.1 - 0.3 mg/dL 0.3    ALP      40 - 150 unit/L 74    AST      11 - 45 unit/L 19    ALT       10 - 44 unit/L 23    Iron      30 - 160 ug/dL 76    Transferrin      200 - 375 mg/dL 251    TIBC      250 - 450 ug/dL 371    Iron Saturation      20 - 50 % 20    PT      9.0 - 12.5 seconds 11.6    INR      0.8 - 1.2  1.1    GGT      8 - 55 U/L 56 (H)    AFP      <=8.4 ng/mL <2.0    BNP      0 - 99 pg/mL 26    Vitamin D      30 - 96 ng/mL 18 (L)    Ferritin      20.0 - 300.0 ng/mL 95.0         Assessment:     1. S/P Fontan procedure    2. Low vitamin D level      Plan:   24 y.o. woman with Fontan physiology related to palliation of HLHS seen in follow-up for FALD.      Congestive Hepatopathy  #  Borderline radiographic splenomegaly only  #  Liver stiffness by US elastography:  1.3 m/s (10/2019)-->1.3 m/s (4/2021)-->1.51 (12/2022)-->1.38 (3/2024)-->1.8 m/s, 9.76 kPa (6/2025)      At-risk for HCC  #  No focal liver lesions on US 6/2025  #  AFP normal (6/2025)      Nutrition  #  Low vitamin D; add cholecalciferol 1,000 IU/day      RTC ~1 year  Repeat US exams around time of visit

## 2025-06-09 NOTE — PROGRESS NOTES
Subjective:    Patient ID:  Jil Lennon is a 24 y.o. female who presents for  scheduled follow-up. She came with her mom and grandmom today.    She had a neurologic event (TIA) a few months ago.    She has HLHS s/p staged palliation with late catheter based re-fenestration at 4 years of age for anasarca/PLE, coarctation stent, LPA stent and Fontan conduit stent (3/7/2019). She is doing very well overall now from a cardiac standpoint but has significant anxiety and eating disorder.     She has a history of abdominal complaints with small bowel bacterial overgrowth syndrome treated with antibiotics. She has an IUD.    She has not had recent lower extremity swelling.      Lluvia has had pleural effusions in the past and has very small diffuse pulmonary micro-AVMs, mild cyanosis and mild exercise intolerance. The fenestration remains of moderate size.The pulmonary micro-AVMs were less obvious on the recent cath and her pulmonary veins were fully saturated.     Several family members have thyroid problems.     Latest hepatology evaluation/follow up was unremarkable.     Review of Systems   Constitutional: Negative.   HENT: Negative.     Eyes: Negative.    Cardiovascular:  Positive for cyanosis.   Respiratory: Negative.     Endocrine: Negative.    Hematologic/Lymphatic: Negative.    Skin: Negative.    Musculoskeletal: Negative.    Gastrointestinal: Negative.    Genitourinary: Negative.    Neurological: Negative.    Psychiatric/Behavioral: Negative.     Allergic/Immunologic: Negative.         Objective:    Physical Exam  Constitutional:       General: She is not in acute distress.     Appearance: She is well-developed. She is not diaphoretic.      Comments: Mild cyanosis.   HENT:      Head: Normocephalic and atraumatic.      Right Ear: External ear normal.      Left Ear: External ear normal.      Nose: Nose normal.      Mouth/Throat:      Pharynx: No oropharyngeal exudate.   Eyes:      General: No scleral icterus.         Right eye: No discharge.         Left eye: No discharge.      Conjunctiva/sclera: Conjunctivae normal.      Pupils: Pupils are equal, round, and reactive to light.   Neck:      Thyroid: No thyromegaly.      Vascular: No JVD.      Trachea: No tracheal deviation.   Cardiovascular:      Rate and Rhythm: Normal rate.      Pulses: Intact distal pulses.           Radial pulses are 2+ on the right side.        Femoral pulses are 2+ on the right side.     Heart sounds: S1 normal. Murmur heard.      High-pitched blowing holosystolic murmur is present with a grade of 1/6 at the lower left sternal border. Single S2      No friction rub. No gallop.   Pulmonary:      Effort: Pulmonary effort is normal. No respiratory distress.      Breath sounds: Normal breath sounds. No stridor. No wheezing or rales.   Chest:      Chest wall: No tenderness.   Abdominal:      General: Bowel sounds are normal. There is no distension.      Palpations: Abdomen is soft. There is no mass.      Tenderness: There is no abdominal tenderness. There is no guarding or rebound.   Musculoskeletal:         General: No tenderness. Normal range of motion.      Cervical back: Normal range of motion and neck supple.   Lymphadenopathy:      Cervical: No cervical adenopathy.   Skin:     General: Skin is warm and dry.      Coloration: Skin is not pale.      Findings: No erythema or rash.   Neurological:      Mental Status: She is alert and oriented to person, place, and time.      Cranial Nerves: No cranial nerve deficit.      Motor: No abnormal muscle tone.      Coordination: Coordination normal.   Psychiatric:         Behavior: Behavior normal.         Thought Content: Thought content normal.         Judgment: Judgment normal.     Sats 82%        Liver US (4/19/2021): normal/unremarkable (mild hepato-splenomegaly as expected post-Fontan).  Labs (2/14/2022) unremarkable except continued mild erythrocythemia ([id number]) and improved total protein  (8.1)    ECG: NSR, RVH  ECHO: Hypoplastic left heart syndrome s/p Fontan. LPA stent, coarctation stent.  No significant change from last echocardiogram.  Laminar flow with no obvious thrombus or obstruction in left innominate vein , right SVC, IVC , Fontan conduit, pulmonary  confluence, stented LPA, proximal RPA, Fontan anastomosis or Abel anastomosis.  LPA distal to stent with laminar flow by color doppler. .  Color doppler demonstrates Fontan fenestration with continuous flow to right atrium at peak velocity <1.5 m/sec. and mean  gradient <4.5 mmHg.  Unobstructed return of pulmonary venous return across large atrial communication to tricuspid valve  Dilated right ventricle, mild.  Thickened right ventricle free wall, moderate.  Qualitatively good systemic right ventricular systolic function, appears slightly improved.  Normal neoaortic valve velocity.  Mild neoaortic valve insufficiency.  Large ascending aorta post Jackie.  Stent noted in descending aorta with peak velocity <2.4 m/sec. and trivial diastolic run off.  No pericardial effusion.    Assessment:       1. HLHS (hypoplastic left heart syndrome), s/p fenestrated Fontan   2. Aorta coarctation, relieved with stent    3. Pulmonary artery stenosis of central branch, relieved with stent    4. Diffuse pulmonary micro-AVMs (arteriovenous malformation)    5. 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 Post-Fontan protein-losing enteropathy, resolved    7. S/P Fontan procedure    8. History of TIA   9. S/P Fontan conduit stent   10. History of palpitations, quiescent   11. Anxiety/Depression        Plan:       1. I reviewed today's findings in detail. We reviewed adult issues with CHD in detail including overall cardiac health, mortality issues, anticoagulation, contraception and pregnancy. Recommended change to eliquis 10 mg po every evening.  2. Same medications (digoxin, lasix, sildenafil, aldactone, eliquis)   3. SBE precautions prn.  4. Treat as normal from  a cardiac standpoint, encouraged increased exercise.  5. Recheck in ACHD in 6 months with ECG, ECHO and Holter,   6. Follow up with Dr. Ac today for liver evaluation, yearly thereafter.

## 2025-06-09 NOTE — LETTER
June 9, 2025        Alberto Swain MD  [address] [place] [address]  [address]  [facility] [id number]             [facility]  [address] [facility] [address]  Phone: [phone]   Patient: Jil Lennon   MR Number: [id number]   YOB: 2000   Date of Visit: 6/9/2025       Dear Dr. Swain:    Thank you for referring Jil Lennon to me for evaluation. Attached you will find relevant portions of my assessment and plan of care.    If you have questions, please do not hesitate to call me. I look forward to following Jil Lennon along with you.    Sincerely,      Tl Ac MD            CC  No Recipients    Enclosure

## 2025-06-09 NOTE — LETTER
June 9, 2025        Alberto Swain MD  [address] [place] [address]  [address]  [facility] [address]             [facility]  [address]  [facility] [address]  Phone: [phone]  Fax: [phone]   Patient: Jil Lennon   MR Number: [id number]   YOB: 2000   Date of Visit: 6/9/2025       Dear Dr. Swain:    Thank you for referring Jil Lennon to me for evaluation. Below are the relevant portions of my assessment and plan of care.      1. HLHS (hypoplastic left heart syndrome), s/p fenestrated Fontan   2. Aorta coarctation, relieved with stent    3. Pulmonary artery stenosis of central branch, relieved with stent    4. Diffuse pulmonary micro-AVMs (arteriovenous malformation)    5. 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 Post-Fontan protein-losing enteropathy, resolved    7. S/P Fontan procedure    8. History of TIA   9. S/P Fontan conduit stent   10. History of palpitations, quiescent   11. Anxiety/Depression        1. I reviewed today's findings in detail. We reviewed adult issues with CHD in detail including overall cardiac health, mortality issues, anticoagulation, contraception and pregnancy. Recommended change to eliquis 10 mg po every evening.  2. Same medications (digoxin, lasix, sildenafil, aldactone, eliquis)   3. SBE precautions prn.  4. Treat as normal from a cardiac standpoint, encouraged increased exercise.  5. Recheck in ACHD in 6 months with ECG, ECHO and Holter,   6. Follow up with Dr. Ac today for liver evaluation, yearly thereafter.      If you have questions, please do not hesitate to call me. I look forward to following Jil along with you.    Sincerely,      Jimi Pollard Jr., MD           CC  No Recipients

## 2025-06-10 ENCOUNTER — RESULTS FOLLOW-UP (OUTPATIENT)
Dept: PEDIATRIC GASTROENTEROLOGY | Facility: CLINIC | Age: 25
End: 2025-06-10

## 2025-06-10 PROBLEM — R79.89 LOW VITAMIN D LEVEL: Status: ACTIVE | Noted: 2025-06-10

## 2025-06-10 RX ORDER — VIT C/E/ZN/COPPR/LUTEIN/ZEAXAN 250MG-90MG
1000 CAPSULE ORAL DAILY
COMMUNITY
Start: 2025-06-10

## 2025-06-24 ENCOUNTER — OFFICE VISIT (OUTPATIENT)
Dept: OPTOMETRY | Facility: CLINIC | Age: 25
End: 2025-06-24
Payer: COMMERCIAL

## 2025-06-24 DIAGNOSIS — H02.201 LAGOPHTHALMOS OF BOTH UPPER EYELIDS: ICD-10-CM

## 2025-06-24 DIAGNOSIS — H47.323 OPTIC DISC DRUSEN, BILATERAL: Primary | ICD-10-CM

## 2025-06-24 DIAGNOSIS — H02.204 LAGOPHTHALMOS OF BOTH UPPER EYELIDS: ICD-10-CM

## 2025-06-24 DIAGNOSIS — H47.333 CROWDED OPTIC DISC, BILATERAL: ICD-10-CM

## 2025-06-24 PROCEDURE — 99999 PR PBB SHADOW E&M-EST. PATIENT-LVL III: CPT | Mod: PBBFAC,,, | Performed by: OPTOMETRIST

## 2025-06-24 PROCEDURE — 99214 OFFICE O/P EST MOD 30 MIN: CPT | Mod: S$GLB,,, | Performed by: OPTOMETRIST

## 2025-06-24 PROCEDURE — G2211 COMPLEX E/M VISIT ADD ON: HCPCS | Mod: S$GLB,,, | Performed by: OPTOMETRIST

## 2025-06-24 PROCEDURE — 92015 DETERMINE REFRACTIVE STATE: CPT | Mod: S$GLB,,, | Performed by: OPTOMETRIST

## 2025-06-24 RX ORDER — NAPROXEN SODIUM 220 MG/1
1 TABLET, FILM COATED ORAL EVERY MORNING
COMMUNITY

## 2025-06-24 NOTE — PROGRESS NOTES
HPI    Jil Lenonn is a 24 y.o. female who comes in for continued eye care.   Jil's has high bilateral myopia.  Glasses and Precision 1 contact   lenses are prescribed for visual correction.  Jil also has   congenital heart disease which is treated with sildenafil. 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  There is no history of optic   neuropathy thus far. Her last exam with me was on 3/14/24. Today, she   reports that comfort, handling and vision with the contact lenses are   good. She relays, however, that distance vision with her glasses is blurry   (they are 2-3 years old).        (+)blurred vision, with glasses only  (--)Headaches  (--)diplopia  (--)flashes  (--)floaters  (--)pain  (--)Itching  (--)tearing  (--)burning  (+)Dryness  (+) OTC Drops, Redness relief  (--)Photophobia     Last edited by Petar Mcelroy, OD on 6/24/2025  2:31 PM.      Review of Systems   Constitutional:  Negative for chills, fever and malaise/fatigue.   HENT:  Negative for congestion.    Eyes:  Positive for blurred vision and redness. Negative for double vision, photophobia, pain and discharge.   Respiratory:  Negative for cough.    Gastrointestinal:  Negative for nausea and vomiting.   Neurological:  Negative for seizures.     For exam results, see encounter report    Assessment /Plan    1. Optic disc drusen, bilateral with Crowded optic disc, bilateral  - High risk for ION secondary to long term treatment with sildenafil for congenital heart disease  - No papilledema/ No pallor  - No ocular pathology  - Pupillary function intact      2. Lagophthalmos of both upper eyelids  - Advised artificial tears, overnight lubricating ophthalmic gel or ointment at bedtime (ex: Systane Overnight Gel, Genteal Overnight Gel, Celluvisc)   - Advised use of a protective sleep mask overnight    3. High bilateral myopia  - Spec Rx per final Rx below    Eyeglasses Prescription (6/24/2025)          Sphere Cylinder Dist [facility]    Right -7.50 Sphere 20/20    Left -7.25 Sphere 20/20      Type: SVL    Expiration Date: 6/24/2026          - CLRx per below for daily disposal/replacement    -Advised against overnight wear, risks of overnight wear explained;     -Systane Balance, Soothe XP, Refresh Optive Advanced artificial tears for comfort prn;    -Optifree Puremoist, Biotrue , or Acuvue RevitaLens solutions recommended     Contact Lens Prescription (6/24/2025)          Brand Base Curve Diameter Sphere    Right Precision1 8.3 14.2 -7.00    Left Precision1 8.3 14.2 -7.00      Expiration Date: 6/24/2026    Replacement: Daily    Solutions: OptiFree PureMoist    Wearing Schedule: Daily Wear              Parent & Patient education; RTC in 6 months for optic nerve/ retina check --> DFE ,Ok to instill 0.5% Tropicamide after baseline workup          Visit today is associated with current or anticipated ongoing medical care related to this patients single serious condition/complex condition  1. Optic disc drusen, bilateral

## 2025-07-24 ENCOUNTER — PATIENT MESSAGE (OUTPATIENT)
Dept: OPTOMETRY | Facility: CLINIC | Age: 25
End: 2025-07-24
Payer: COMMERCIAL

## 2025-08-19 DIAGNOSIS — S32.050D COMPRESSION FRACTURE OF L5 VERTEBRA WITH ROUTINE HEALING, SUBSEQUENT ENCOUNTER: Primary | ICD-10-CM

## 2025-08-19 RX ORDER — METHOCARBAMOL 750 MG/1
750 TABLET, FILM COATED ORAL 4 TIMES DAILY
Qty: 10 TABLET | Refills: 0 | Status: SHIPPED | OUTPATIENT
Start: 2025-08-19 | End: 2025-08-22

## 2025-08-19 RX ORDER — HYDROCODONE BITARTRATE AND ACETAMINOPHEN 7.5; 325 MG/1; MG/1
1 TABLET ORAL 3 TIMES DAILY PRN
Qty: 8 TABLET | Refills: 0 | Status: SHIPPED | OUTPATIENT
Start: 2025-08-19

## (undated) DEVICE — BLLN B1B 4 X 20 X 110

## (undated) DEVICE — PROTECTION STATION PLUS

## (undated) DEVICE — KIT CUSTOM MANIFOLD

## (undated) DEVICE — CATH WEDGE 7FRX110CM

## (undated) DEVICE — COVER BAND BAG 40 X 40

## (undated) DEVICE — CATH TRANSIT

## (undated) DEVICE — LINE 60IN PRESSURE MON.

## (undated) DEVICE — COVER BAND BAG 28 X 12

## (undated) DEVICE — MICROPUNCTURE PEDIATRIC

## (undated) DEVICE — SHEATH INTRODUCER 6FR 11CM

## (undated) DEVICE — OMNIPAQUE 350 200ML

## (undated) DEVICE — WIRE GUIDE SAFE-T-J .035 260CM

## (undated) DEVICE — PACK PEDIATRIC ANGIOGRAPHY

## (undated) DEVICE — SHEATH INTRODUCER 7FR 11CM

## (undated) DEVICE — CATH ANG PIGTAIL 4FR INFINITY

## (undated) DEVICE — KIT MNTR POLE MT DUL 12&48 MAC

## (undated) DEVICE — CONTRAST VISIPAQUE 150ML

## (undated) DEVICE — Device

## (undated) DEVICE — KIT SHEATH 9FRX11CM

## (undated) DEVICE — CUP MEDICINE STERILE 2OZ

## (undated) DEVICE — GUIDEWIRE AMPLATZ .035X260 SS

## (undated) DEVICE — STOPCOCK 3-WAY

## (undated) DEVICE — GUIDEWIRE EMERALD 150CM PTFE

## (undated) DEVICE — KIT MICROINTRO 4F .018X40X7CM

## (undated) DEVICE — CATH WEDGE 6FR X 110CM

## (undated) DEVICE — PAD DEFIB CADENCE ADULT R2

## (undated) DEVICE — CATH INFINITI JUDKINS JR4

## (undated) DEVICE — GUIDEWIRE CHOICE PT FLPY 182CM

## (undated) DEVICE — GUIDEWIRE ROSEN VAS JTIP 180CM

## (undated) DEVICE — SHEATH INTRODUCER 4FR 11CM

## (undated) DEVICE — INFLATOR ENCORE 26 BLLN INFL

## (undated) DEVICE — CATH ARI 4FR

## (undated) DEVICE — CATH AUROUS CM SZ PIGTAIL 5F

## (undated) DEVICE — WIRE TIP DEFLECTING .035X145CM

## (undated) DEVICE — GUIDEWIRE ANG STF .035INX18CM

## (undated) DEVICE — COVER PROBE US 5.5X58L NON LTX

## (undated) DEVICE — CATH MPA2 INFINITI 4FR 100CM

## (undated) DEVICE — CATH AL1 4FR

## (undated) DEVICE — PACK PEDIATRIC ANGIOGRAPHY OMC

## (undated) DEVICE — SPIKE CONTRAST CONTROLLER

## (undated) DEVICE — SYR MED RAD 150ML

## (undated) DEVICE — GUIDE WIRE WHOLLY FLOPPY

## (undated) DEVICE — SPIKE SHORT LG BORE 1-WAY 2IN

## (undated) DEVICE — GUIDEWIRE STD .035X180CM ANG